# Patient Record
Sex: FEMALE | Race: WHITE | NOT HISPANIC OR LATINO
[De-identification: names, ages, dates, MRNs, and addresses within clinical notes are randomized per-mention and may not be internally consistent; named-entity substitution may affect disease eponyms.]

---

## 2017-07-12 ENCOUNTER — RESULT REVIEW (OUTPATIENT)
Age: 57
End: 2017-07-12

## 2017-07-17 ENCOUNTER — OUTPATIENT (OUTPATIENT)
Dept: OUTPATIENT SERVICES | Facility: HOSPITAL | Age: 57
LOS: 1 days | End: 2017-07-17
Payer: COMMERCIAL

## 2017-07-17 ENCOUNTER — APPOINTMENT (OUTPATIENT)
Dept: MAMMOGRAPHY | Facility: IMAGING CENTER | Age: 57
End: 2017-07-17

## 2017-07-17 DIAGNOSIS — Z00.00 ENCOUNTER FOR GENERAL ADULT MEDICAL EXAMINATION WITHOUT ABNORMAL FINDINGS: ICD-10-CM

## 2017-07-17 PROCEDURE — 77063 BREAST TOMOSYNTHESIS BI: CPT

## 2017-07-17 PROCEDURE — 77067 SCR MAMMO BI INCL CAD: CPT

## 2017-08-21 ENCOUNTER — RESULT REVIEW (OUTPATIENT)
Age: 57
End: 2017-08-21

## 2017-08-21 ENCOUNTER — OUTPATIENT (OUTPATIENT)
Dept: OUTPATIENT SERVICES | Facility: HOSPITAL | Age: 57
LOS: 1 days | Discharge: HOME | End: 2017-08-21
Payer: COMMERCIAL

## 2017-08-21 ENCOUNTER — TRANSCRIPTION ENCOUNTER (OUTPATIENT)
Age: 57
End: 2017-08-21

## 2017-08-21 DIAGNOSIS — R13.11 DYSPHAGIA, ORAL PHASE: ICD-10-CM

## 2017-08-21 DIAGNOSIS — D50.0 IRON DEFICIENCY ANEMIA SECONDARY TO BLOOD LOSS (CHRONIC): ICD-10-CM

## 2017-08-21 PROCEDURE — 45380 COLONOSCOPY AND BIOPSY: CPT | Mod: PT

## 2017-08-21 PROCEDURE — 88312 SPECIAL STAINS GROUP 1: CPT | Mod: 26

## 2017-08-21 PROCEDURE — 88305 TISSUE EXAM BY PATHOLOGIST: CPT

## 2017-08-21 PROCEDURE — 88313 SPECIAL STAINS GROUP 2: CPT

## 2017-08-21 PROCEDURE — 88305 TISSUE EXAM BY PATHOLOGIST: CPT | Mod: 26

## 2017-08-21 PROCEDURE — 88313 SPECIAL STAINS GROUP 2: CPT | Mod: 26

## 2017-08-21 PROCEDURE — 88312 SPECIAL STAINS GROUP 1: CPT

## 2017-08-21 PROCEDURE — 43239 EGD BIOPSY SINGLE/MULTIPLE: CPT

## 2017-08-25 DIAGNOSIS — K29.90 GASTRODUODENITIS, UNSPECIFIED, WITHOUT BLEEDING: ICD-10-CM

## 2017-08-25 DIAGNOSIS — Z98.84 BARIATRIC SURGERY STATUS: ICD-10-CM

## 2017-08-25 DIAGNOSIS — G47.33 OBSTRUCTIVE SLEEP APNEA (ADULT) (PEDIATRIC): ICD-10-CM

## 2017-08-25 DIAGNOSIS — Z12.11 ENCOUNTER FOR SCREENING FOR MALIGNANT NEOPLASM OF COLON: ICD-10-CM

## 2017-08-25 DIAGNOSIS — K64.8 OTHER HEMORRHOIDS: ICD-10-CM

## 2017-08-25 DIAGNOSIS — D12.5 BENIGN NEOPLASM OF SIGMOID COLON: ICD-10-CM

## 2017-08-25 DIAGNOSIS — K29.50 UNSPECIFIED CHRONIC GASTRITIS WITHOUT BLEEDING: ICD-10-CM

## 2017-08-25 DIAGNOSIS — K57.30 DIVERTICULOSIS OF LARGE INTESTINE WITHOUT PERFORATION OR ABSCESS WITHOUT BLEEDING: ICD-10-CM

## 2018-01-16 ENCOUNTER — APPOINTMENT (OUTPATIENT)
Dept: CARDIOLOGY | Facility: CLINIC | Age: 58
End: 2018-01-16
Payer: COMMERCIAL

## 2018-01-16 VITALS
WEIGHT: 293 LBS | DIASTOLIC BLOOD PRESSURE: 94 MMHG | HEIGHT: 68 IN | HEART RATE: 56 BPM | BODY MASS INDEX: 44.41 KG/M2 | OXYGEN SATURATION: 100 % | SYSTOLIC BLOOD PRESSURE: 127 MMHG

## 2018-01-16 VITALS — SYSTOLIC BLOOD PRESSURE: 122 MMHG | DIASTOLIC BLOOD PRESSURE: 78 MMHG

## 2018-01-16 DIAGNOSIS — R55 SYNCOPE AND COLLAPSE: ICD-10-CM

## 2018-01-16 DIAGNOSIS — Z82.49 FAMILY HISTORY OF ISCHEMIC HEART DISEASE AND OTHER DISEASES OF THE CIRCULATORY SYSTEM: ICD-10-CM

## 2018-01-16 PROCEDURE — 99203 OFFICE O/P NEW LOW 30 MIN: CPT

## 2018-01-16 PROCEDURE — 93000 ELECTROCARDIOGRAM COMPLETE: CPT

## 2018-01-17 PROBLEM — R55 SYNCOPE, NEAR: Status: ACTIVE | Noted: 2018-01-16

## 2018-01-25 ENCOUNTER — OUTPATIENT (OUTPATIENT)
Dept: OUTPATIENT SERVICES | Facility: HOSPITAL | Age: 58
LOS: 1 days | End: 2018-01-25

## 2018-01-25 ENCOUNTER — APPOINTMENT (OUTPATIENT)
Dept: CV DIAGNOSITCS | Facility: HOSPITAL | Age: 58
End: 2018-01-25
Payer: COMMERCIAL

## 2018-01-25 DIAGNOSIS — R55 SYNCOPE AND COLLAPSE: ICD-10-CM

## 2018-01-25 PROCEDURE — 93306 TTE W/DOPPLER COMPLETE: CPT | Mod: 26

## 2018-03-27 ENCOUNTER — TRANSCRIPTION ENCOUNTER (OUTPATIENT)
Age: 58
End: 2018-03-27

## 2018-09-18 ENCOUNTER — OUTPATIENT (OUTPATIENT)
Dept: OUTPATIENT SERVICES | Facility: HOSPITAL | Age: 58
LOS: 1 days | End: 2018-09-18
Payer: COMMERCIAL

## 2018-09-18 ENCOUNTER — APPOINTMENT (OUTPATIENT)
Dept: BARIATRICS | Facility: CLINIC | Age: 58
End: 2018-09-18
Payer: COMMERCIAL

## 2018-09-18 VITALS
WEIGHT: 293 LBS | OXYGEN SATURATION: 97 % | DIASTOLIC BLOOD PRESSURE: 68 MMHG | HEIGHT: 68 IN | BODY MASS INDEX: 44.41 KG/M2 | HEART RATE: 56 BPM | SYSTOLIC BLOOD PRESSURE: 124 MMHG

## 2018-09-18 DIAGNOSIS — Z98.84 BARIATRIC SURGERY STATUS: ICD-10-CM

## 2018-09-18 PROCEDURE — 74220 X-RAY XM ESOPHAGUS 1CNTRST: CPT | Mod: 26

## 2018-09-18 PROCEDURE — S2083 ADJUSTMENT GASTRIC BAND: CPT | Mod: 59

## 2018-09-18 PROCEDURE — 74220 X-RAY XM ESOPHAGUS 1CNTRST: CPT

## 2018-09-18 PROCEDURE — 99244 OFF/OP CNSLTJ NEW/EST MOD 40: CPT

## 2018-09-26 ENCOUNTER — APPOINTMENT (OUTPATIENT)
Dept: MAMMOGRAPHY | Facility: IMAGING CENTER | Age: 58
End: 2018-09-26
Payer: COMMERCIAL

## 2018-09-26 ENCOUNTER — OUTPATIENT (OUTPATIENT)
Dept: OUTPATIENT SERVICES | Facility: HOSPITAL | Age: 58
LOS: 1 days | End: 2018-09-26
Payer: COMMERCIAL

## 2018-09-26 DIAGNOSIS — Z00.8 ENCOUNTER FOR OTHER GENERAL EXAMINATION: ICD-10-CM

## 2018-09-26 PROCEDURE — 77067 SCR MAMMO BI INCL CAD: CPT | Mod: 26

## 2018-09-26 PROCEDURE — 77063 BREAST TOMOSYNTHESIS BI: CPT

## 2018-09-26 PROCEDURE — 77067 SCR MAMMO BI INCL CAD: CPT

## 2018-09-26 PROCEDURE — 77063 BREAST TOMOSYNTHESIS BI: CPT | Mod: 26

## 2018-10-02 ENCOUNTER — TRANSCRIPTION ENCOUNTER (OUTPATIENT)
Age: 58
End: 2018-10-02

## 2018-10-09 ENCOUNTER — APPOINTMENT (OUTPATIENT)
Dept: BARIATRICS/WEIGHT MGMT | Facility: CLINIC | Age: 58
End: 2018-10-09
Payer: COMMERCIAL

## 2018-10-09 VITALS — BODY MASS INDEX: 49.07 KG/M2 | WEIGHT: 293 LBS

## 2018-10-09 DIAGNOSIS — Z78.9 OTHER SPECIFIED HEALTH STATUS: ICD-10-CM

## 2018-10-09 PROCEDURE — 90791 PSYCH DIAGNOSTIC EVALUATION: CPT

## 2018-10-18 ENCOUNTER — APPOINTMENT (OUTPATIENT)
Dept: BARIATRICS/WEIGHT MGMT | Facility: CLINIC | Age: 58
End: 2018-10-18
Payer: COMMERCIAL

## 2018-10-18 PROCEDURE — 97802 MEDICAL NUTRITION INDIV IN: CPT

## 2018-10-19 VITALS — BODY MASS INDEX: 44.41 KG/M2 | HEIGHT: 68 IN | WEIGHT: 293 LBS

## 2018-11-06 ENCOUNTER — APPOINTMENT (OUTPATIENT)
Dept: CARDIOLOGY | Facility: CLINIC | Age: 58
End: 2018-11-06
Payer: COMMERCIAL

## 2018-11-06 ENCOUNTER — NON-APPOINTMENT (OUTPATIENT)
Age: 58
End: 2018-11-06

## 2018-11-06 VITALS
HEART RATE: 51 BPM | SYSTOLIC BLOOD PRESSURE: 166 MMHG | RESPIRATION RATE: 16 BRPM | DIASTOLIC BLOOD PRESSURE: 81 MMHG | HEIGHT: 68 IN | BODY MASS INDEX: 44.41 KG/M2 | WEIGHT: 293 LBS | OXYGEN SATURATION: 97 %

## 2018-11-06 VITALS — DIASTOLIC BLOOD PRESSURE: 80 MMHG | SYSTOLIC BLOOD PRESSURE: 126 MMHG

## 2018-11-06 DIAGNOSIS — Z01.818 ENCOUNTER FOR OTHER PREPROCEDURAL EXAMINATION: ICD-10-CM

## 2018-11-06 PROCEDURE — 93000 ELECTROCARDIOGRAM COMPLETE: CPT

## 2018-11-06 PROCEDURE — 99213 OFFICE O/P EST LOW 20 MIN: CPT

## 2018-11-13 ENCOUNTER — APPOINTMENT (OUTPATIENT)
Dept: CV DIAGNOSITCS | Facility: HOSPITAL | Age: 58
End: 2018-11-13
Payer: COMMERCIAL

## 2018-11-13 ENCOUNTER — OUTPATIENT (OUTPATIENT)
Dept: OUTPATIENT SERVICES | Facility: HOSPITAL | Age: 58
LOS: 1 days | End: 2018-11-13

## 2018-11-13 DIAGNOSIS — Z01.818 ENCOUNTER FOR OTHER PREPROCEDURAL EXAMINATION: ICD-10-CM

## 2018-11-13 PROCEDURE — 93325 DOPPLER ECHO COLOR FLOW MAPG: CPT | Mod: 26,GC

## 2018-11-13 PROCEDURE — 93320 DOPPLER ECHO COMPLETE: CPT | Mod: 26,GC

## 2018-11-13 PROCEDURE — 93351 STRESS TTE COMPLETE: CPT | Mod: 26

## 2018-11-15 ENCOUNTER — OUTPATIENT (OUTPATIENT)
Dept: OUTPATIENT SERVICES | Facility: HOSPITAL | Age: 58
LOS: 1 days | End: 2018-11-15
Payer: COMMERCIAL

## 2018-11-15 ENCOUNTER — APPOINTMENT (OUTPATIENT)
Dept: ULTRASOUND IMAGING | Facility: IMAGING CENTER | Age: 58
End: 2018-11-15
Payer: COMMERCIAL

## 2018-11-15 DIAGNOSIS — K82.4 CHOLESTEROLOSIS OF GALLBLADDER: ICD-10-CM

## 2018-11-15 DIAGNOSIS — Z01.818 ENCOUNTER FOR OTHER PREPROCEDURAL EXAMINATION: ICD-10-CM

## 2018-11-15 PROCEDURE — 76700 US EXAM ABDOM COMPLETE: CPT | Mod: 26

## 2018-11-15 PROCEDURE — 93970 EXTREMITY STUDY: CPT | Mod: 26

## 2018-11-15 PROCEDURE — 93970 EXTREMITY STUDY: CPT

## 2018-11-15 PROCEDURE — 76700 US EXAM ABDOM COMPLETE: CPT

## 2018-11-16 PROBLEM — K82.4 GALLBLADDER POLYP: Status: ACTIVE | Noted: 2018-11-16

## 2018-12-04 LAB
25(OH)D3 SERPL-MCNC: 14.3 NG/ML
ALBUMIN SERPL ELPH-MCNC: 4.1 G/DL
ALP BLD-CCNC: 86 U/L
ALT SERPL-CCNC: 18 U/L
ANION GAP SERPL CALC-SCNC: 13 MMOL/L
AST SERPL-CCNC: 24 U/L
BASOPHILS # BLD AUTO: 0.01 K/UL
BASOPHILS NFR BLD AUTO: 0.1 %
BILIRUB SERPL-MCNC: 0.5 MG/DL
BUN SERPL-MCNC: 13 MG/DL
CALCIUM SERPL-MCNC: 9.8 MG/DL
CALCIUM SERPL-MCNC: 9.8 MG/DL
CHLORIDE SERPL-SCNC: 101 MMOL/L
CHOLEST SERPL-MCNC: 234 MG/DL
CHOLEST/HDLC SERPL: 3.2 RATIO
CO2 SERPL-SCNC: 26 MMOL/L
CREAT SERPL-MCNC: 0.72 MG/DL
EOSINOPHIL # BLD AUTO: 0.11 K/UL
EOSINOPHIL NFR BLD AUTO: 1.6 %
FERRITIN SERPL-MCNC: 127 NG/ML
FOLATE SERPL-MCNC: 6.5 NG/ML
GLUCOSE SERPL-MCNC: 70 MG/DL
HBA1C MFR BLD HPLC: 5.3 %
HCT VFR BLD CALC: 41.9 %
HDLC SERPL-MCNC: 74 MG/DL
HGB BLD-MCNC: 13.3 G/DL
IMM GRANULOCYTES NFR BLD AUTO: 0.1 %
INR PPP: 0.91 RATIO
IRON SATN MFR SERPL: 22 %
IRON SERPL-MCNC: 83 UG/DL
LDLC SERPL CALC-MCNC: 145 MG/DL
LYMPHOCYTES # BLD AUTO: 1.7 K/UL
LYMPHOCYTES NFR BLD AUTO: 25.3 %
MAN DIFF?: NORMAL
MCHC RBC-ENTMCNC: 29.6 PG
MCHC RBC-ENTMCNC: 31.7 GM/DL
MCV RBC AUTO: 93.1 FL
MONOCYTES # BLD AUTO: 0.38 K/UL
MONOCYTES NFR BLD AUTO: 5.6 %
NEUTROPHILS # BLD AUTO: 4.52 K/UL
NEUTROPHILS NFR BLD AUTO: 67.3 %
PARATHYROID HORMONE INTACT: 63 PG/ML
PLATELET # BLD AUTO: 301 K/UL
POTASSIUM SERPL-SCNC: 4.4 MMOL/L
PROT SERPL-MCNC: 7.5 G/DL
PT BLD: 10.4 SEC
RBC # BLD: 4.5 M/UL
RBC # FLD: 14.2 %
SODIUM SERPL-SCNC: 140 MMOL/L
T4 FREE SERPL-MCNC: 1.1 NG/DL
TIBC SERPL-MCNC: 382 UG/DL
TRIGL SERPL-MCNC: 74 MG/DL
TSH SERPL-ACNC: 1.64 UIU/ML
UIBC SERPL-MCNC: 299 UG/DL
VIT A SERPL-MCNC: 44.6 UG/DL
VIT B1 SERPL-MCNC: 130.3 NMOL/L
VIT B12 SERPL-MCNC: 380 PG/ML
VIT B2 SERPL-MCNC: 166 UG/L
VIT B6 SERPL-MCNC: 3.6 UG/L
WBC # FLD AUTO: 6.73 K/UL
ZINC SERPL-MCNC: 81 UG/DL

## 2018-12-04 NOTE — HISTORY OF PRESENT ILLNESS
[FreeTextEntry1] : Presents in for follow up. Denies any acute issues. \par  Scheduled for lab band surgery - [place] Possibly in 2019. \par \par Denies any CP, SOB, dizziness, LH, near syncope and syncope. Except for occ palpitations that last for few seconds - " POUNDING "in nature that occur once in a month. Feels lightheaded when palps last for longer period. \par Brother passed away with cardiomyopathy - 2018. Father in law passed away 4 days later. States it has been a difficult year.  \par \par FYI - Was advised Cardionet in the past,  but did not pursue.  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 \par She carries a significant family history of CAD ( dad  s/p MI ), brother with cardiomyopathy ( on transplant list at [facility]) and personal history of obesity s/p lap band surgery ( 8 years ago), h/o palpitations 3 episodes in the last few years ( 1 episodes that woke her from sleep ) and the other 2 episodes lasted for few mins before subsiding. \par In the past, prior to the lap band surgery she underwent a TTE and stress test that showed normal wall motion/ no inducible ischemia or infarction.  \par

## 2018-12-04 NOTE — ADDENDUM
[FreeTextEntry1] : Patient sp stress echo with normal LV function and no ischemia. Poor exercise tolerance\par Can proceed with bariatric surgery with no further workup. Patient is medically optimized.

## 2018-12-04 NOTE — DISCUSSION/SUMMARY
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 \par \par Last TTE 2018 - Normal LVEF. \par However, recommend Stress echocardiogram to evaluate wall-motion abnormality. \par If any evidence of inducible arrhythmia will consider Cardionet monitor.   \par \par \par \par

## 2018-12-04 NOTE — PHYSICAL EXAM
[General Appearance - Well Developed] : well developed [Normal Appearance] : normal appearance [Well Groomed] : well groomed [General Appearance - Well Nourished] : well nourished [No Deformities] : no deformities [General Appearance - In No Acute Distress] : no acute distress [Normal Conjunctiva] : the conjunctiva exhibited no abnormalities [Eyelids - No Xanthelasma] : the eyelids demonstrated no xanthelasmas [Normal Oral Mucosa] : normal oral mucosa [No Oral Pallor] : no oral pallor [No Oral Cyanosis] : no oral cyanosis [Normal Jugular Venous A Waves Present] : normal jugular venous A waves present [Normal Jugular Venous V Waves Present] : normal jugular venous V waves present [No Jugular Venous Pinto A Waves] : no jugular venous pinto A waves [Respiration, Rhythm And Depth] : normal respiratory rhythm and effort [Exaggerated Use Of Accessory Muscles For Inspiration] : no accessory muscle use [Auscultation Breath Sounds / Voice Sounds] : lungs were clear to auscultation bilaterally [Abdomen Soft] : soft [Abdomen Tenderness] : non-tender [Abdomen Mass (___ Cm)] : no abdominal mass palpated [Abnormal Walk] : normal gait [Gait - Sufficient For Exercise Testing] : the gait was sufficient for exercise testing [Nail Clubbing] : no clubbing of the fingernails [Cyanosis, Localized] : no localized cyanosis [Petechial Hemorrhages (___cm)] : no petechial hemorrhages [Skin Color & Pigmentation] : normal skin color and pigmentation [] : no rash [No Venous Stasis] : no venous stasis [Skin Lesions] : no skin lesions [No Skin Ulcers] : no skin ulcer [No Xanthoma] : no  xanthoma was observed [Oriented To Time, Place, And Person] : oriented to person, place, and time [Affect] : the affect was normal [Mood] : the mood was normal [No Anxiety] : not feeling anxious [Normal] : normal [Normal Rate] : normal [Rhythm Regular] : regular [Normal S1] : normal S1 [Normal S2] : normal S2 [No Murmur] : no murmurs heard [2+] : left 2+ [FreeTextEntry1] : obese abdomen

## 2018-12-09 ENCOUNTER — TRANSCRIPTION ENCOUNTER (OUTPATIENT)
Age: 58
End: 2018-12-09

## 2018-12-10 ENCOUNTER — OUTPATIENT (OUTPATIENT)
Dept: OUTPATIENT SERVICES | Facility: HOSPITAL | Age: 58
LOS: 1 days | End: 2018-12-10
Payer: COMMERCIAL

## 2018-12-10 ENCOUNTER — RESULT REVIEW (OUTPATIENT)
Age: 58
End: 2018-12-10

## 2018-12-10 DIAGNOSIS — R13.11 DYSPHAGIA, ORAL PHASE: ICD-10-CM

## 2018-12-10 PROCEDURE — 88312 SPECIAL STAINS GROUP 1: CPT

## 2018-12-10 PROCEDURE — 88312 SPECIAL STAINS GROUP 1: CPT | Mod: 26

## 2018-12-10 PROCEDURE — 88305 TISSUE EXAM BY PATHOLOGIST: CPT

## 2018-12-10 PROCEDURE — 88313 SPECIAL STAINS GROUP 2: CPT | Mod: 26

## 2018-12-10 PROCEDURE — 88313 SPECIAL STAINS GROUP 2: CPT

## 2018-12-10 PROCEDURE — 88305 TISSUE EXAM BY PATHOLOGIST: CPT | Mod: 26

## 2018-12-10 PROCEDURE — 43239 EGD BIOPSY SINGLE/MULTIPLE: CPT

## 2018-12-17 ENCOUNTER — APPOINTMENT (OUTPATIENT)
Dept: ELECTROPHYSIOLOGY | Facility: CLINIC | Age: 58
End: 2018-12-17
Payer: COMMERCIAL

## 2018-12-17 VITALS
BODY MASS INDEX: 48.66 KG/M2 | WEIGHT: 293 LBS | DIASTOLIC BLOOD PRESSURE: 77 MMHG | HEART RATE: 68 BPM | SYSTOLIC BLOOD PRESSURE: 130 MMHG | OXYGEN SATURATION: 98 %

## 2018-12-17 VITALS — HEIGHT: 68 IN

## 2018-12-17 DIAGNOSIS — R00.1 BRADYCARDIA, UNSPECIFIED: ICD-10-CM

## 2018-12-17 PROCEDURE — 99205 OFFICE O/P NEW HI 60 MIN: CPT

## 2018-12-17 PROCEDURE — 93000 ELECTROCARDIOGRAM COMPLETE: CPT

## 2018-12-17 RX ORDER — FLUTICASONE PROPIONATE 50 UG/1
50 SPRAY, METERED NASAL
Qty: 16 | Refills: 0 | Status: DISCONTINUED | COMMUNITY
Start: 2018-09-30

## 2018-12-17 RX ORDER — DOXYCYCLINE HYCLATE 100 MG/1
100 CAPSULE ORAL
Qty: 14 | Refills: 0 | Status: DISCONTINUED | COMMUNITY
Start: 2018-09-30

## 2018-12-17 NOTE — HISTORY OF PRESENT ILLNESS
[FreeTextEntry1] : Kelsie Kirkpatrick MD\par \par Barbara Worthington is a 59y/o woman with Hx of knee pain, utilizes meloxicam as needed, and episodes of palpitations with new diagnosed paroxysmal afib with RVR who presents today for initial evaluation. Admits doing well, currently wearing CaridoNet monitor. Experiences palpitations, typically at night which is awakes her from sleep, which correlated with episode of afib on CardioNet. She has been experiencing these palpitations throughout the past few years but never underwent monitoring or testing. Episodes seem to come and go spontaneously and can last up to an hour in duration. Denies chest pain, SOB, syncope or near syncope. Is also being evaluated for bariatric surgery this upcoming January.

## 2018-12-17 NOTE — PHYSICAL EXAM
[General Appearance - Well Developed] : well developed [Normal Appearance] : normal appearance [Well Groomed] : well groomed [General Appearance - Well Nourished] : well nourished [No Deformities] : no deformities [General Appearance - In No Acute Distress] : no acute distress [Normal Conjunctiva] : the conjunctiva exhibited no abnormalities [Eyelids - No Xanthelasma] : the eyelids demonstrated no xanthelasmas [Normal Oral Mucosa] : normal oral mucosa [No Oral Pallor] : no oral pallor [No Oral Cyanosis] : no oral cyanosis [Normal Jugular Venous A Waves Present] : normal jugular venous A waves present [Normal Jugular Venous V Waves Present] : normal jugular venous V waves present [No Jugular Venous Pinto A Waves] : no jugular venous pinto A waves [Heart Rate And Rhythm] : heart rate and rhythm were normal [Heart Sounds] : normal S1 and S2 [Murmurs] : no murmurs present [Respiration, Rhythm And Depth] : normal respiratory rhythm and effort [Exaggerated Use Of Accessory Muscles For Inspiration] : no accessory muscle use [Auscultation Breath Sounds / Voice Sounds] : lungs were clear to auscultation bilaterally [Abdomen Soft] : soft [Abdomen Tenderness] : non-tender [Abdomen Mass (___ Cm)] : no abdominal mass palpated [Abnormal Walk] : normal gait [Gait - Sufficient For Exercise Testing] : the gait was sufficient for exercise testing [Nail Clubbing] : no clubbing of the fingernails [Cyanosis, Localized] : no localized cyanosis [Petechial Hemorrhages (___cm)] : no petechial hemorrhages [Skin Color & Pigmentation] : normal skin color and pigmentation [] : no rash [No Venous Stasis] : no venous stasis [Skin Lesions] : no skin lesions [No Skin Ulcers] : no skin ulcer [No Xanthoma] : no  xanthoma was observed [Oriented To Time, Place, And Person] : oriented to person, place, and time [Affect] : the affect was normal [Mood] : the mood was normal [No Anxiety] : not feeling anxious

## 2018-12-17 NOTE — DISCUSSION/SUMMARY
[FreeTextEntry1] : Barbara Worthington is a 59y/o woman with Hx of knee pain, utilizes meloxicam as needed, and episodes of palpitations with new diagnosed paroxysmal afib with RVR who presents today for initial evaluation. \par \par Impression:\par \par 1. Paroxysmal afib: Symptomatic afib with RVR noted on CardioNet. Will initiate metoprolol tart 12.5mg BID for improved rate control management and Eliquis 5mg BID for thromboembolic prophylaxis. If symptomatic episodes recur, may consider possible antiarrhythmics vs PVI ablation. Recommend placement of ILR for monitoring of afib burden. Risks, benefits, and alternatives to ILR placement discussed. Will call to schedule if she decides to proceed. \par \par 2. Obesity: upcoming bariatric surgery for this January. May need to hold Eliquis prior to procedure. \par \par 3. Sinus bradycardia: EKG today NSR. Has noted sinus bradycardia and states resting heart rate typically in the 50s. Will start with low dose metoprolol and if symptoms of dizziness or lightheaded occur, will let us know and may need to consider alternative treatment at that time.\par \par Will call to schedule for ILR placement if decides to proceed. \par

## 2018-12-24 ENCOUNTER — NON-APPOINTMENT (OUTPATIENT)
Age: 58
End: 2018-12-24

## 2019-01-02 ENCOUNTER — APPOINTMENT (OUTPATIENT)
Dept: ORTHOPEDIC SURGERY | Facility: CLINIC | Age: 59
End: 2019-01-02
Payer: COMMERCIAL

## 2019-01-02 VITALS
SYSTOLIC BLOOD PRESSURE: 131 MMHG | DIASTOLIC BLOOD PRESSURE: 83 MMHG | WEIGHT: 293 LBS | BODY MASS INDEX: 44.41 KG/M2 | HEIGHT: 68 IN | HEART RATE: 51 BPM

## 2019-01-02 DIAGNOSIS — M79.672 PAIN IN LEFT FOOT: ICD-10-CM

## 2019-01-02 PROCEDURE — 99203 OFFICE O/P NEW LOW 30 MIN: CPT

## 2019-01-02 PROCEDURE — 73630 X-RAY EXAM OF FOOT: CPT | Mod: LT

## 2019-01-15 ENCOUNTER — APPOINTMENT (OUTPATIENT)
Dept: ORTHOPEDIC SURGERY | Facility: CLINIC | Age: 59
End: 2019-01-15
Payer: COMMERCIAL

## 2019-01-15 PROCEDURE — 73630 X-RAY EXAM OF FOOT: CPT | Mod: LT

## 2019-01-15 PROCEDURE — 99213 OFFICE O/P EST LOW 20 MIN: CPT

## 2019-01-15 NOTE — HISTORY OF PRESENT ILLNESS
[A CAM Boot] : a CAM boot [FreeTextEntry1] : 58 year female returns for f/u of L heel pain x 12/31/18. Pt reports progressive improvement since last visit. Pt still has intermittent pain today. Pt states she re-injured her L ankle twice about 1-2 weeks ago. Pt denies any numbness/tingling. Pt limps with ambulation. Denies additional musculoskeletal complaints referable to foot/ankle. Pt is using short cam boot on L foot for ambulation.

## 2019-01-15 NOTE — PHYSICAL EXAM
[Normal] : Oriented to person, place, and time, insight and judgement were intact and the affect was normal [de-identified] : Extremity: ++Equinus L LE, nontender L Achilles, left Achilles tendon intact, Dotson testing normal L LE, decreased minimal tenderness plantar fascia origin L hindfoot, negative calcaneal tuber squeeze testing L hindfoot, mild soft tissue swelling and associated tenderness lateral aspect Chopart's joint L hindfoot.  Nontender L ankle, peroneals, syndesmosis, ST, midfoot LF and PTT insertional, and forefoot / base 5th metatarsal.  Stable Drawer testing L ankle, 5 / 5 evertor strength L ankle, calves soft and nontender, sensorimotor unchanged, skin intact B LE.  AOx3, mood / affect normal. [de-identified] : MERI, NAD [de-identified] : Radiographs (3v L foot) reveal posterior and plantar calcaneal enthesophyte L hindfoot, potential avulsion injury lateral Chopart's joint L hindfoot, ST calcifications distal leg.

## 2019-01-23 ENCOUNTER — RESULT REVIEW (OUTPATIENT)
Age: 59
End: 2019-01-23

## 2019-01-23 ENCOUNTER — APPOINTMENT (OUTPATIENT)
Age: 59
End: 2019-01-23

## 2019-01-29 ENCOUNTER — APPOINTMENT (OUTPATIENT)
Dept: ORTHOPEDIC SURGERY | Facility: CLINIC | Age: 59
End: 2019-01-29
Payer: COMMERCIAL

## 2019-01-29 DIAGNOSIS — M21.6X2 OTHER ACQUIRED DEFORMITIES OF LEFT FOOT: ICD-10-CM

## 2019-01-29 DIAGNOSIS — S93.602D UNSPECIFIED SPRAIN OF LEFT FOOT, SUBSEQUENT ENCOUNTER: ICD-10-CM

## 2019-01-29 DIAGNOSIS — M72.2 PLANTAR FASCIAL FIBROMATOSIS: ICD-10-CM

## 2019-01-29 PROCEDURE — 99213 OFFICE O/P EST LOW 20 MIN: CPT

## 2019-01-31 ENCOUNTER — OTHER (OUTPATIENT)
Age: 59
End: 2019-01-31

## 2019-01-31 PROBLEM — M72.2 PLANTAR FASCIA SYNDROME: Status: ACTIVE | Noted: 2019-01-02

## 2019-01-31 PROBLEM — S93.602D SPRAIN OF LEFT FOOT, SUBSEQUENT ENCOUNTER: Status: ACTIVE | Noted: 2019-01-15

## 2019-01-31 PROBLEM — M21.6X2 GASTROCNEMIUS EQUINUS OF LEFT LOWER EXTREMITY: Status: ACTIVE | Noted: 2019-01-02

## 2019-02-05 ENCOUNTER — OUTPATIENT (OUTPATIENT)
Dept: OUTPATIENT SERVICES | Facility: HOSPITAL | Age: 59
LOS: 1 days | Discharge: HOME | End: 2019-02-05
Payer: COMMERCIAL

## 2019-02-05 DIAGNOSIS — I48.0 PAROXYSMAL ATRIAL FIBRILLATION: ICD-10-CM

## 2019-02-05 DIAGNOSIS — Z46.51 ENCOUNTER FOR FITTING AND ADJUSTMENT OF GASTRIC LAP BAND: Chronic | ICD-10-CM

## 2019-02-05 PROCEDURE — 33285 INSJ SUBQ CAR RHYTHM MNTR: CPT

## 2019-02-05 RX ORDER — SODIUM CHLORIDE 9 MG/ML
3 INJECTION INTRAMUSCULAR; INTRAVENOUS; SUBCUTANEOUS EVERY 8 HOURS
Qty: 0 | Refills: 0 | Status: DISCONTINUED | OUTPATIENT
Start: 2019-02-05 | End: 2019-02-20

## 2019-02-05 RX ORDER — APIXABAN 2.5 MG/1
1 TABLET, FILM COATED ORAL
Qty: 180 | Refills: 0 | OUTPATIENT
Start: 2019-02-05 | End: 2019-05-05

## 2019-02-05 RX ORDER — METOPROLOL TARTRATE 50 MG
0.5 TABLET ORAL
Qty: 90 | Refills: 0 | OUTPATIENT
Start: 2019-02-05 | End: 2019-05-05

## 2019-02-05 NOTE — H&P CARDIOLOGY - PMH
Arthritis    Eczema, unspecified type    Hypercholesterolemia    Obesity, unspecified classification, unspecified obesity type, unspecified whether serious comorbidity present    PAF (paroxysmal atrial fibrillation)  on Eliquis

## 2019-02-05 NOTE — H&P CARDIOLOGY - HISTORY OF PRESENT ILLNESS
58 y.o. female presents today for elective ILR implant. The patient c/o episodes of palpitations, was found to have PAF on CardioNet.  The patient denies chest pain, SOB, dizziness, b/l lower extremities edema, presyncope, syncope, melena, hematochezia, fever, chills, abdominal pain, N/V/D/C, urinary symptoms, h/o DVT, PE, TB, recent travel.

## 2019-02-05 NOTE — H&P CARDIOLOGY - REVIEW OF SYSTEMS
The patient denies chest pain, SOB, dizziness, b/l lower extremities edema, presyncope, syncope, melena, hematochezia, fever, chills, abdominal pain, N/V/D/C, urinary symptoms, h/o DVT, PE, TB, recent travel.

## 2019-02-21 ENCOUNTER — APPOINTMENT (OUTPATIENT)
Dept: ELECTROPHYSIOLOGY | Facility: CLINIC | Age: 59
End: 2019-02-21
Payer: COMMERCIAL

## 2019-02-21 PROBLEM — I48.0 PAROXYSMAL ATRIAL FIBRILLATION: Chronic | Status: ACTIVE | Noted: 2019-02-05

## 2019-02-21 PROBLEM — E78.00 PURE HYPERCHOLESTEROLEMIA, UNSPECIFIED: Chronic | Status: ACTIVE | Noted: 2019-02-05

## 2019-02-21 PROBLEM — E66.9 OBESITY, UNSPECIFIED: Chronic | Status: ACTIVE | Noted: 2019-02-05

## 2019-02-21 PROBLEM — L30.9 DERMATITIS, UNSPECIFIED: Chronic | Status: ACTIVE | Noted: 2019-02-05

## 2019-02-21 PROBLEM — M19.90 UNSPECIFIED OSTEOARTHRITIS, UNSPECIFIED SITE: Chronic | Status: ACTIVE | Noted: 2019-02-05

## 2019-02-21 PROCEDURE — 93285 PRGRMG DEV EVAL SCRMS IP: CPT

## 2019-03-11 ENCOUNTER — APPOINTMENT (OUTPATIENT)
Dept: BARIATRICS | Facility: CLINIC | Age: 59
End: 2019-03-11
Payer: COMMERCIAL

## 2019-03-11 VITALS
DIASTOLIC BLOOD PRESSURE: 80 MMHG | HEART RATE: 55 BPM | BODY MASS INDEX: 44.41 KG/M2 | SYSTOLIC BLOOD PRESSURE: 110 MMHG | HEIGHT: 68 IN | WEIGHT: 293 LBS | OXYGEN SATURATION: 98 %

## 2019-03-11 PROCEDURE — 99214 OFFICE O/P EST MOD 30 MIN: CPT

## 2019-03-14 NOTE — HISTORY OF PRESENT ILLNESS
[Procedure: ___] : Procedure performed: [unfilled]  [Date of Surgery: ___] : Date of Surgery:   [unfilled] [de-identified] : 58 year old female with longstanding history of morbid obesity and history of LAGB undergoing workup for single stage revision from lap band to laparoscopic sleeve gastrectomy presents today for preoperative visit. Patient gained minimal weight since last visit despite complete lap band deflation at initial consult. Patient completed her workup and interim was diagnosed with atrial fibrillation and started on Eliquis and has loop recorder in place. She is aware that she will need to stop Eliquis one week prior to surgery. Patient was previously noncompliant with CPAP and is awaiting new CPAP machine. She reports that she is no longer drinking soda and decreased wine intake to 1 glass 3-4 times a week. She walks at least 8.5K steps daily.  She denies recent stuck episodes, acid reflux symptoms, nausea, vomiting, diarrhea and constipation.

## 2019-03-14 NOTE — REVIEW OF SYSTEMS
[Recent Change In Weight] : ~T recent weight change [Lower Ext Edema] : lower extremity edema [Joint Stiffness] : joint stiffness [Negative] : Endocrine [Palpitations] : no palpitations [Skin Rash] : no skin rash [FreeTextEntry2] : w

## 2019-03-14 NOTE — ASSESSMENT
[FreeTextEntry1] : 58 year old female with longstanding history of morbid obesity and history of LAGB undergoing workup for single stage revision from lap band to laparoscopic sleeve gastrectomy and is ready to be scheduled for revisional bariatric surgery. Patient was encouraged to lose weight prior to surgery. Patient will be on preoperative modified liquid diet.  Nutrition and exercise guidelines were reviewed with the patient. Patient will attend preoperative education class. Procedure risks and benefits were again discussed with patient. All questions were answered. She was informed that she will need to have CPAP machine prior to surgery. \par \par Schedule surgery date\par Two-week preoperative modified liquid diet\par Stop Eliquis one week prior to surgery\par Follow up with CPAP company\par PST and Medical clearance\par Preoperative education class\par Call if any questions or concerns.

## 2019-03-14 NOTE — PHYSICAL EXAM
[Obese, well nourished, in no acute distress] : obese, well nourished, in no acute distress [Normal] : affect appropriate [de-identified] : EOMI, anicteric  [de-identified] : obese, soft, nontender, no evidence of hernia, port palpable nontender

## 2019-03-25 ENCOUNTER — APPOINTMENT (OUTPATIENT)
Dept: ELECTROPHYSIOLOGY | Facility: CLINIC | Age: 59
End: 2019-03-25
Payer: COMMERCIAL

## 2019-03-25 PROCEDURE — 93298 REM INTERROG DEV EVAL SCRMS: CPT

## 2019-03-25 PROCEDURE — 93299: CPT

## 2019-03-28 ENCOUNTER — OUTPATIENT (OUTPATIENT)
Dept: OUTPATIENT SERVICES | Facility: HOSPITAL | Age: 59
LOS: 1 days | End: 2019-03-28
Payer: COMMERCIAL

## 2019-03-28 VITALS
HEIGHT: 66 IN | SYSTOLIC BLOOD PRESSURE: 120 MMHG | TEMPERATURE: 98 F | DIASTOLIC BLOOD PRESSURE: 80 MMHG | RESPIRATION RATE: 14 BRPM | HEART RATE: 48 BPM | WEIGHT: 293 LBS | OXYGEN SATURATION: 99 %

## 2019-03-28 DIAGNOSIS — Z01.818 ENCOUNTER FOR OTHER PREPROCEDURAL EXAMINATION: ICD-10-CM

## 2019-03-28 DIAGNOSIS — Z98.84 BARIATRIC SURGERY STATUS: Chronic | ICD-10-CM

## 2019-03-28 DIAGNOSIS — E66.01 MORBID (SEVERE) OBESITY DUE TO EXCESS CALORIES: ICD-10-CM

## 2019-03-28 DIAGNOSIS — Z46.51 ENCOUNTER FOR FITTING AND ADJUSTMENT OF GASTRIC LAP BAND: Chronic | ICD-10-CM

## 2019-03-28 DIAGNOSIS — Z98.84 BARIATRIC SURGERY STATUS: ICD-10-CM

## 2019-03-28 DIAGNOSIS — G47.33 OBSTRUCTIVE SLEEP APNEA (ADULT) (PEDIATRIC): ICD-10-CM

## 2019-03-28 DIAGNOSIS — Z95.818 PRESENCE OF OTHER CARDIAC IMPLANTS AND GRAFTS: Chronic | ICD-10-CM

## 2019-03-28 LAB
ANION GAP SERPL CALC-SCNC: 7 MMOL/L — SIGNIFICANT CHANGE UP (ref 5–17)
BLD GP AB SCN SERPL QL: SIGNIFICANT CHANGE UP
BUN SERPL-MCNC: 16 MG/DL — SIGNIFICANT CHANGE UP (ref 7–23)
CALCIUM SERPL-MCNC: 9.5 MG/DL — SIGNIFICANT CHANGE UP (ref 8.4–10.5)
CHLORIDE SERPL-SCNC: 103 MMOL/L — SIGNIFICANT CHANGE UP (ref 96–108)
CO2 SERPL-SCNC: 28 MMOL/L — SIGNIFICANT CHANGE UP (ref 22–31)
CREAT SERPL-MCNC: 0.68 MG/DL — SIGNIFICANT CHANGE UP (ref 0.5–1.3)
GLUCOSE SERPL-MCNC: 86 MG/DL — SIGNIFICANT CHANGE UP (ref 70–99)
HCT VFR BLD CALC: 44.4 % — SIGNIFICANT CHANGE UP (ref 34.5–45)
HGB BLD-MCNC: 14.4 G/DL — SIGNIFICANT CHANGE UP (ref 11.5–15.5)
MCHC RBC-ENTMCNC: 29.9 PG — SIGNIFICANT CHANGE UP (ref 27–34)
MCHC RBC-ENTMCNC: 32.4 GM/DL — SIGNIFICANT CHANGE UP (ref 32–36)
MCV RBC AUTO: 92.1 FL — SIGNIFICANT CHANGE UP (ref 80–100)
NRBC # BLD: 0 /100 WBCS — SIGNIFICANT CHANGE UP (ref 0–0)
PLATELET # BLD AUTO: 272 K/UL — SIGNIFICANT CHANGE UP (ref 150–400)
POTASSIUM SERPL-MCNC: 4.3 MMOL/L — SIGNIFICANT CHANGE UP (ref 3.5–5.3)
POTASSIUM SERPL-SCNC: 4.3 MMOL/L — SIGNIFICANT CHANGE UP (ref 3.5–5.3)
RBC # BLD: 4.82 M/UL — SIGNIFICANT CHANGE UP (ref 3.8–5.2)
RBC # FLD: 13.4 % — SIGNIFICANT CHANGE UP (ref 10.3–14.5)
SODIUM SERPL-SCNC: 138 MMOL/L — SIGNIFICANT CHANGE UP (ref 135–145)
WBC # BLD: 6.73 K/UL — SIGNIFICANT CHANGE UP (ref 3.8–10.5)
WBC # FLD AUTO: 6.73 K/UL — SIGNIFICANT CHANGE UP (ref 3.8–10.5)

## 2019-03-28 PROCEDURE — 80048 BASIC METABOLIC PNL TOTAL CA: CPT

## 2019-03-28 PROCEDURE — 86901 BLOOD TYPING SEROLOGIC RH(D): CPT

## 2019-03-28 PROCEDURE — 86900 BLOOD TYPING SEROLOGIC ABO: CPT

## 2019-03-28 PROCEDURE — G0463: CPT

## 2019-03-28 PROCEDURE — 93010 ELECTROCARDIOGRAM REPORT: CPT

## 2019-03-28 PROCEDURE — 36415 COLL VENOUS BLD VENIPUNCTURE: CPT

## 2019-03-28 PROCEDURE — 93005 ELECTROCARDIOGRAM TRACING: CPT

## 2019-03-28 PROCEDURE — 85027 COMPLETE CBC AUTOMATED: CPT

## 2019-03-28 PROCEDURE — 86850 RBC ANTIBODY SCREEN: CPT

## 2019-03-28 RX ORDER — METOPROLOL TARTRATE 50 MG
0.5 TABLET ORAL
Qty: 0 | Refills: 0 | COMMUNITY

## 2019-03-28 RX ORDER — APIXABAN 2.5 MG/1
1 TABLET, FILM COATED ORAL
Qty: 0 | Refills: 0 | COMMUNITY

## 2019-03-28 NOTE — H&P PST ADULT - HISTORY OF PRESENT ILLNESS
This is a 59 y/o female who had undergone lap band placement on 2008 presents with c/o vomiting , multiple episodes food stuck for the past several month . patient states she only lost 50 lbs with lap band , however she regained back . evaluated by surgeon , madison for surgery . scheduled for lap band removal and sleeve gastrectomy on 4/9/19

## 2019-03-28 NOTE — H&P PST ADULT - NSICDXPASTMEDICALHX_GEN_ALL_CORE_FT
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

## 2019-03-28 NOTE — H&P PST ADULT - NSICDXPROBLEM_GEN_ALL_CORE_FT
PROBLEM DIAGNOSES  Problem: Morbid obesity  Assessment and Plan: Laparoscopic band removal and laparoscopic sleeve gastrectomy   Medical clearance   pre op instructions   Cardiac clearance already seen   Will stop Eliquis 1 week before sx as per cardiologist   Address EKG in the clearance

## 2019-03-28 NOTE — H&P PST ADULT - NSICDXPASTSURGICALHX_GEN_ALL_CORE_FT
PAST SURGICAL HISTORY:  Status post gastric banding surgery 2008    Status post placement of implantable loop recorder 2/2019

## 2019-03-28 NOTE — H&P PST ADULT - RS GEN PE MLT RESP DETAILS PC
clear to auscultation bilaterally/good air movement/respirations non-labored/no intercostal retractions/no rales

## 2019-03-28 NOTE — H&P PST ADULT - NSICDXFAMILYHX_GEN_ALL_CORE_FT
FAMILY HISTORY:  Family history of cardiomyopathy, brother diseased at age 54  FH: lung cancer, mother  FHx: heart disease, father diseased at age 52

## 2019-03-28 NOTE — H&P PST ADULT - EXTREMITIES COMMENTS
Bilateral  lower extremity above ankle hyperpigmented skin , brownish discoloration, trace edema > left leg

## 2019-03-29 PROBLEM — Z87.448 PERSONAL HISTORY OF OTHER DISEASES OF URINARY SYSTEM: Chronic | Status: ACTIVE | Noted: 2019-03-28

## 2019-04-04 RX ORDER — HYDROMORPHONE HYDROCHLORIDE 2 MG/ML
0.5 INJECTION INTRAMUSCULAR; INTRAVENOUS; SUBCUTANEOUS EVERY 4 HOURS
Qty: 0 | Refills: 0 | Status: DISCONTINUED | OUTPATIENT
Start: 2019-04-09 | End: 2019-04-10

## 2019-04-04 RX ORDER — SODIUM CHLORIDE 9 MG/ML
1000 INJECTION, SOLUTION INTRAVENOUS
Qty: 0 | Refills: 0 | Status: DISCONTINUED | OUTPATIENT
Start: 2019-04-09 | End: 2019-04-10

## 2019-04-04 RX ORDER — ENOXAPARIN SODIUM 100 MG/ML
40 INJECTION SUBCUTANEOUS EVERY 12 HOURS
Qty: 0 | Refills: 0 | Status: DISCONTINUED | OUTPATIENT
Start: 2019-04-09 | End: 2019-04-10

## 2019-04-04 RX ORDER — HYOSCYAMINE SULFATE 0.13 MG
0.12 TABLET ORAL EVERY 6 HOURS
Qty: 0 | Refills: 0 | Status: DISCONTINUED | OUTPATIENT
Start: 2019-04-09 | End: 2019-04-10

## 2019-04-04 RX ORDER — ONDANSETRON 8 MG/1
4 TABLET, FILM COATED ORAL EVERY 6 HOURS
Qty: 0 | Refills: 0 | Status: DISCONTINUED | OUTPATIENT
Start: 2019-04-09 | End: 2019-04-10

## 2019-04-04 RX ORDER — PANTOPRAZOLE SODIUM 20 MG/1
40 TABLET, DELAYED RELEASE ORAL DAILY
Qty: 0 | Refills: 0 | Status: DISCONTINUED | OUTPATIENT
Start: 2019-04-09 | End: 2019-04-10

## 2019-04-08 ENCOUNTER — TRANSCRIPTION ENCOUNTER (OUTPATIENT)
Age: 59
End: 2019-04-08

## 2019-04-09 ENCOUNTER — RESULT REVIEW (OUTPATIENT)
Age: 59
End: 2019-04-09

## 2019-04-09 ENCOUNTER — APPOINTMENT (OUTPATIENT)
Dept: BARIATRICS | Facility: HOSPITAL | Age: 59
End: 2019-04-09
Payer: COMMERCIAL

## 2019-04-09 ENCOUNTER — INPATIENT (INPATIENT)
Facility: HOSPITAL | Age: 59
LOS: 0 days | Discharge: HOME | DRG: 621 | End: 2019-04-10
Attending: SURGERY | Admitting: SURGERY
Payer: COMMERCIAL

## 2019-04-09 VITALS
SYSTOLIC BLOOD PRESSURE: 130 MMHG | HEIGHT: 68 IN | HEART RATE: 56 BPM | OXYGEN SATURATION: 100 % | WEIGHT: 293 LBS | RESPIRATION RATE: 14 BRPM | TEMPERATURE: 98 F | DIASTOLIC BLOOD PRESSURE: 76 MMHG

## 2019-04-09 DIAGNOSIS — Z01.818 ENCOUNTER FOR OTHER PREPROCEDURAL EXAMINATION: ICD-10-CM

## 2019-04-09 DIAGNOSIS — E66.01 MORBID (SEVERE) OBESITY DUE TO EXCESS CALORIES: ICD-10-CM

## 2019-04-09 DIAGNOSIS — Z98.84 BARIATRIC SURGERY STATUS: Chronic | ICD-10-CM

## 2019-04-09 DIAGNOSIS — I87.2 VENOUS INSUFFICIENCY (CHRONIC) (PERIPHERAL): ICD-10-CM

## 2019-04-09 DIAGNOSIS — Z95.818 PRESENCE OF OTHER CARDIAC IMPLANTS AND GRAFTS: Chronic | ICD-10-CM

## 2019-04-09 DIAGNOSIS — Z98.84 BARIATRIC SURGERY STATUS: ICD-10-CM

## 2019-04-09 DIAGNOSIS — G47.33 OBSTRUCTIVE SLEEP APNEA (ADULT) (PEDIATRIC): ICD-10-CM

## 2019-04-09 DIAGNOSIS — E78.00 PURE HYPERCHOLESTEROLEMIA, UNSPECIFIED: ICD-10-CM

## 2019-04-09 DIAGNOSIS — I48.0 PAROXYSMAL ATRIAL FIBRILLATION: ICD-10-CM

## 2019-04-09 LAB — ABO RH CONFIRMATION: SIGNIFICANT CHANGE UP

## 2019-04-09 PROCEDURE — 43775 LAP SLEEVE GASTRECTOMY: CPT | Mod: AS

## 2019-04-09 PROCEDURE — 88302 TISSUE EXAM BY PATHOLOGIST: CPT | Mod: 26

## 2019-04-09 PROCEDURE — 88305 TISSUE EXAM BY PATHOLOGIST: CPT | Mod: 26

## 2019-04-09 PROCEDURE — 43774 LAP RMVL GASTR ADJ ALL PARTS: CPT

## 2019-04-09 PROCEDURE — 43281 LAP PARAESOPHAG HERN REPAIR: CPT | Mod: AS,59

## 2019-04-09 PROCEDURE — 43774 LAP RMVL GASTR ADJ ALL PARTS: CPT | Mod: AS

## 2019-04-09 PROCEDURE — 43281 LAP PARAESOPHAG HERN REPAIR: CPT | Mod: 59

## 2019-04-09 PROCEDURE — 43775 LAP SLEEVE GASTRECTOMY: CPT | Mod: 22

## 2019-04-09 RX ORDER — SODIUM CHLORIDE 9 MG/ML
1000 INJECTION, SOLUTION INTRAVENOUS
Qty: 0 | Refills: 0 | Status: DISCONTINUED | OUTPATIENT
Start: 2019-04-09 | End: 2019-04-09

## 2019-04-09 RX ORDER — ONDANSETRON 8 MG/1
4 TABLET, FILM COATED ORAL ONCE
Qty: 0 | Refills: 0 | Status: DISCONTINUED | OUTPATIENT
Start: 2019-04-09 | End: 2019-04-09

## 2019-04-09 RX ORDER — METOPROLOL TARTRATE 50 MG
5 TABLET ORAL EVERY 6 HOURS
Qty: 0 | Refills: 0 | Status: DISCONTINUED | OUTPATIENT
Start: 2019-04-09 | End: 2019-04-10

## 2019-04-09 RX ORDER — ACETAMINOPHEN 500 MG
1000 TABLET ORAL EVERY 6 HOURS
Qty: 0 | Refills: 0 | Status: DISCONTINUED | OUTPATIENT
Start: 2019-04-10 | End: 2019-04-10

## 2019-04-09 RX ORDER — AZTREONAM 2 G
2000 VIAL (EA) INJECTION ONCE
Qty: 0 | Refills: 0 | Status: DISCONTINUED | OUTPATIENT
Start: 2019-04-09 | End: 2019-04-09

## 2019-04-09 RX ORDER — APREPITANT 80 MG/1
40 CAPSULE ORAL ONCE
Qty: 0 | Refills: 0 | Status: COMPLETED | OUTPATIENT
Start: 2019-04-09 | End: 2019-04-09

## 2019-04-09 RX ORDER — SODIUM CHLORIDE 9 MG/ML
1000 INJECTION, SOLUTION INTRAVENOUS ONCE
Qty: 0 | Refills: 0 | Status: COMPLETED | OUTPATIENT
Start: 2019-04-09 | End: 2019-04-09

## 2019-04-09 RX ORDER — CHLORHEXIDINE GLUCONATE 213 G/1000ML
1 SOLUTION TOPICAL ONCE
Qty: 0 | Refills: 0 | Status: COMPLETED | OUTPATIENT
Start: 2019-04-09 | End: 2019-04-09

## 2019-04-09 RX ORDER — HYDROMORPHONE HYDROCHLORIDE 2 MG/ML
0.5 INJECTION INTRAMUSCULAR; INTRAVENOUS; SUBCUTANEOUS
Qty: 0 | Refills: 0 | Status: DISCONTINUED | OUTPATIENT
Start: 2019-04-09 | End: 2019-04-09

## 2019-04-09 RX ORDER — ACETAMINOPHEN 500 MG
1000 TABLET ORAL ONCE
Qty: 0 | Refills: 0 | Status: COMPLETED | OUTPATIENT
Start: 2019-04-09 | End: 2019-04-09

## 2019-04-09 RX ORDER — ENOXAPARIN SODIUM 100 MG/ML
40 INJECTION SUBCUTANEOUS ONCE
Qty: 0 | Refills: 0 | Status: COMPLETED | OUTPATIENT
Start: 2019-04-09 | End: 2019-04-09

## 2019-04-09 RX ORDER — IBUPROFEN 200 MG
800 TABLET ORAL EVERY 6 HOURS
Qty: 0 | Refills: 0 | Status: DISCONTINUED | OUTPATIENT
Start: 2019-04-09 | End: 2019-04-10

## 2019-04-09 RX ORDER — ACETAMINOPHEN 500 MG
1000 TABLET ORAL EVERY 6 HOURS
Qty: 0 | Refills: 0 | Status: COMPLETED | OUTPATIENT
Start: 2019-04-09 | End: 2019-04-10

## 2019-04-09 RX ADMIN — Medication 1000 MILLIGRAM(S): at 15:00

## 2019-04-09 RX ADMIN — Medication 400 MILLIGRAM(S): at 20:51

## 2019-04-09 RX ADMIN — SODIUM CHLORIDE 500 MILLILITER(S): 9 INJECTION, SOLUTION INTRAVENOUS at 14:28

## 2019-04-09 RX ADMIN — ENOXAPARIN SODIUM 40 MILLIGRAM(S): 100 INJECTION SUBCUTANEOUS at 20:51

## 2019-04-09 RX ADMIN — HYDROMORPHONE HYDROCHLORIDE 0.5 MILLIGRAM(S): 2 INJECTION INTRAMUSCULAR; INTRAVENOUS; SUBCUTANEOUS at 23:10

## 2019-04-09 RX ADMIN — SODIUM CHLORIDE 1000 MILLILITER(S): 9 INJECTION, SOLUTION INTRAVENOUS at 08:00

## 2019-04-09 RX ADMIN — HYDROMORPHONE HYDROCHLORIDE 0.5 MILLIGRAM(S): 2 INJECTION INTRAMUSCULAR; INTRAVENOUS; SUBCUTANEOUS at 19:22

## 2019-04-09 RX ADMIN — HYDROMORPHONE HYDROCHLORIDE 0.5 MILLIGRAM(S): 2 INJECTION INTRAMUSCULAR; INTRAVENOUS; SUBCUTANEOUS at 19:06

## 2019-04-09 RX ADMIN — APREPITANT 40 MILLIGRAM(S): 80 CAPSULE ORAL at 08:00

## 2019-04-09 RX ADMIN — Medication 400 MILLIGRAM(S): at 14:28

## 2019-04-09 RX ADMIN — ENOXAPARIN SODIUM 40 MILLIGRAM(S): 100 INJECTION SUBCUTANEOUS at 08:00

## 2019-04-09 RX ADMIN — Medication 800 MILLIGRAM(S): at 15:24

## 2019-04-09 RX ADMIN — SODIUM CHLORIDE 150 MILLILITER(S): 9 INJECTION, SOLUTION INTRAVENOUS at 22:48

## 2019-04-09 RX ADMIN — SODIUM CHLORIDE 150 MILLILITER(S): 9 INJECTION, SOLUTION INTRAVENOUS at 18:29

## 2019-04-09 RX ADMIN — HYDROMORPHONE HYDROCHLORIDE 0.5 MILLIGRAM(S): 2 INJECTION INTRAMUSCULAR; INTRAVENOUS; SUBCUTANEOUS at 22:47

## 2019-04-09 RX ADMIN — Medication 516 MILLIGRAM(S): at 14:45

## 2019-04-09 RX ADMIN — CHLORHEXIDINE GLUCONATE 1 APPLICATION(S): 213 SOLUTION TOPICAL at 08:00

## 2019-04-09 RX ADMIN — Medication 5 MILLIGRAM(S): at 14:27

## 2019-04-09 RX ADMIN — SODIUM CHLORIDE 75 MILLILITER(S): 9 INJECTION, SOLUTION INTRAVENOUS at 12:45

## 2019-04-09 RX ADMIN — ONDANSETRON 4 MILLIGRAM(S): 8 TABLET, FILM COATED ORAL at 18:28

## 2019-04-09 NOTE — CONSULT NOTE ADULT - SUBJECTIVE AND OBJECTIVE BOX
PULMONARY/CRITICAL CARE        Patient is a 58y old  Female who presents with a chief complaint of gastric sleeve  BRIEF HOSPITAL COURSE: ***    Events last 24 hours: ***    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  Arthritis  PAF (paroxysmal atrial fibrillation): on Eliquis  Obesity, unspecified classification, unspecified obesity type, unspecified whether serious comorbidity present  Hypercholesterolemia  Eczema, unspecified type  Status post placement of implantable loop recorder: 2/2019  Status post gastric banding surgery: 2008    Allergies    penicillins (Unknown)    Intolerances      FAMILY HISTORY:  Family history of cardiomyopathy: brother diseased at age 54  FH: lung cancer: mother  FHx: heart disease: father diseased at age 52      Review of Systems:  CONSTITUTIONAL: No fever, chills, or fatigue  EYES: No eye pain, visual disturbances, or discharge  ENMT:  No difficulty hearing, tinnitus, vertigo; No sinus or throat pain  NECK: No pain or stiffness  RESPIRATORY: No cough, wheezing, chills or hemoptysis; No shortness of breath  CARDIOVASCULAR: No chest pain, palpitations, dizziness, or leg swelling  GASTROINTESTINAL: No abdominal or epigastric pain. No nausea, vomiting, or hematemesis; No diarrhea or constipation. No melena or hematochezia.  GENITOURINARY: No dysuria, frequency, hematuria, or incontinence  NEUROLOGICAL: No headaches, memory loss, loss of strength, numbness, or tremors  SKIN: No itching, burning, rashes, or lesions   MUSCULOSKELETAL: No joint pain or swelling; No muscle, back, or extremity pain  PSYCHIATRIC: No depression, anxiety, mood swings, or difficulty sleeping      Medications:  aztreonam  IVPB 2000 milliGRAM(s) IV Intermittent every 6 hours                    lactated ringers. 1000 milliLiter(s) IV Continuous <Continuous>                ICU Vital Signs Last 24 Hrs  T(C): 36.6 (09 Apr 2019 07:36), Max: 36.6 (09 Apr 2019 07:36)  T(F): 97.8 (09 Apr 2019 07:36), Max: 97.8 (09 Apr 2019 07:36)  HR: 56 (09 Apr 2019 07:36) (56 - 56)  BP: 130/76 (09 Apr 2019 07:36) (130/76 - 130/76)  BP(mean): --  ABP: --  ABP(mean): --  RR: 14 (09 Apr 2019 07:36) (14 - 14)  SpO2: 100% (09 Apr 2019 07:36) (100% - 100%)    Vital Signs Last 24 Hrs  T(C): 36.6 (09 Apr 2019 07:36), Max: 36.6 (09 Apr 2019 07:36)  T(F): 97.8 (09 Apr 2019 07:36), Max: 97.8 (09 Apr 2019 07:36)  HR: 56 (09 Apr 2019 07:36) (56 - 56)  BP: 130/76 (09 Apr 2019 07:36) (130/76 - 130/76)  BP(mean): --  RR: 14 (09 Apr 2019 07:36) (14 - 14)  SpO2: 100% (09 Apr 2019 07:36) (100% - 100%)        I&O's Detail        LABS:                CAPILLARY BLOOD GLUCOSE            CULTURES:      Physical Examination:    General: No acute distress.  obese female    HEENT: Pupils equal, reactive to light.  Symmetric.    PULM: Clear to auscultation bilaterally, no significant sputum production    CVS: Regular rate and rhythm, no murmurs, rubs, or gallops    ABD: Soft, nondistended, nontender, normoactive bowel sounds, no masses    EXT: No edema, nontender  Stasis changes lower legs. No calf tenderness    SKIN: Warm and well perfused, no rashes noted.    NEURO: Alert, oriented, interactive, nonfocal    RADIOLOGY: ***    CRITICAL CARE TIME SPENT: ***

## 2019-04-09 NOTE — BRIEF OPERATIVE NOTE - NSICDXBRIEFPREOP_GEN_ALL_CORE_FT
PRE-OP DIAGNOSIS:  Morbid obesity with BMI of 45.0-49.9, adult 09-Apr-2019 13:09:53  Bromberg, Nancy E  Morbid obesity due to excess calories 09-Apr-2019 13:09:35  Bromberg, Nancy E  SIMA (obstructive sleep apnea) 09-Apr-2019 13:09:15  Bromberg, Nancy E  Bariatric surgery status 09-Apr-2019 13:08:59  Bromberg, Nancy E

## 2019-04-09 NOTE — BRIEF OPERATIVE NOTE - NSICDXBRIEFPOSTOP_GEN_ALL_CORE_FT
POST-OP DIAGNOSIS:  Morbid obesity due to excess calories 09-Apr-2019 13:12:13  Bromberg, Nancy E  Morbid obesity with BMI of 45.0-49.9, adult 09-Apr-2019 13:11:45  Bromberg, Nancy E  SIMA (obstructive sleep apnea) 09-Apr-2019 13:11:33  Bromberg, Nancy E  Bariatric surgery status 09-Apr-2019 13:11:13  Bromberg, Nancy E  Abdominal adhesions 09-Apr-2019 13:10:54  Bromberg, Nancy E  Hiatal hernia 09-Apr-2019 13:10:39  Bromberg, Nancy E

## 2019-04-09 NOTE — BRIEF OPERATIVE NOTE - NSICDXBRIEFPROCEDURE_GEN_ALL_CORE_FT
PROCEDURES:  EGD 09-Apr-2019 13:18:02 twice Bromberg, Nancy E  Lysis of peritoneal adhesions 09-Apr-2019 13:17:47  Bromberg, Nancy E  Laparoscopic sleeve gastrectomy 09-Apr-2019 13:14:46  Bromberg, Nancy E  Laparoscopic repair of hiatal hernia without using mesh 09-Apr-2019 13:14:29  Bromberg, Nancy E  Laparoscopic removal of gastric band and port 09-Apr-2019 13:13:05  Bromberg, Nancy E

## 2019-04-10 ENCOUNTER — TRANSCRIPTION ENCOUNTER (OUTPATIENT)
Age: 59
End: 2019-04-10

## 2019-04-10 VITALS
OXYGEN SATURATION: 97 % | HEART RATE: 61 BPM | SYSTOLIC BLOOD PRESSURE: 111 MMHG | TEMPERATURE: 97 F | RESPIRATION RATE: 14 BRPM | DIASTOLIC BLOOD PRESSURE: 67 MMHG

## 2019-04-10 DIAGNOSIS — Z98.84 BARIATRIC SURGERY STATUS: ICD-10-CM

## 2019-04-10 DIAGNOSIS — K44.9 DIAPHRAGMATIC HERNIA WITHOUT OBSTRUCTION OR GANGRENE: ICD-10-CM

## 2019-04-10 LAB
ANION GAP SERPL CALC-SCNC: 13 MMOL/L — SIGNIFICANT CHANGE UP (ref 5–17)
BUN SERPL-MCNC: 9 MG/DL — SIGNIFICANT CHANGE UP (ref 7–23)
CALCIUM SERPL-MCNC: 8.6 MG/DL — SIGNIFICANT CHANGE UP (ref 8.4–10.5)
CHLORIDE SERPL-SCNC: 105 MMOL/L — SIGNIFICANT CHANGE UP (ref 96–108)
CO2 SERPL-SCNC: 23 MMOL/L — SIGNIFICANT CHANGE UP (ref 22–31)
CREAT SERPL-MCNC: 0.58 MG/DL — SIGNIFICANT CHANGE UP (ref 0.5–1.3)
GLUCOSE SERPL-MCNC: 91 MG/DL — SIGNIFICANT CHANGE UP (ref 70–99)
HCT VFR BLD CALC: 38.7 % — SIGNIFICANT CHANGE UP (ref 34.5–45)
HGB BLD-MCNC: 12.7 G/DL — SIGNIFICANT CHANGE UP (ref 11.5–15.5)
MCHC RBC-ENTMCNC: 30.1 PG — SIGNIFICANT CHANGE UP (ref 27–34)
MCHC RBC-ENTMCNC: 32.8 GM/DL — SIGNIFICANT CHANGE UP (ref 32–36)
MCV RBC AUTO: 91.7 FL — SIGNIFICANT CHANGE UP (ref 80–100)
NRBC # BLD: 0 /100 WBCS — SIGNIFICANT CHANGE UP (ref 0–0)
PLATELET # BLD AUTO: 243 K/UL — SIGNIFICANT CHANGE UP (ref 150–400)
POTASSIUM SERPL-MCNC: 3.9 MMOL/L — SIGNIFICANT CHANGE UP (ref 3.5–5.3)
POTASSIUM SERPL-SCNC: 3.9 MMOL/L — SIGNIFICANT CHANGE UP (ref 3.5–5.3)
RBC # BLD: 4.22 M/UL — SIGNIFICANT CHANGE UP (ref 3.8–5.2)
RBC # FLD: 13.3 % — SIGNIFICANT CHANGE UP (ref 10.3–14.5)
SODIUM SERPL-SCNC: 141 MMOL/L — SIGNIFICANT CHANGE UP (ref 135–145)
WBC # BLD: 9.96 K/UL — SIGNIFICANT CHANGE UP (ref 3.8–10.5)
WBC # FLD AUTO: 9.96 K/UL — SIGNIFICANT CHANGE UP (ref 3.8–10.5)

## 2019-04-10 PROCEDURE — 94760 N-INVAS EAR/PLS OXIMETRY 1: CPT

## 2019-04-10 PROCEDURE — 88305 TISSUE EXAM BY PATHOLOGIST: CPT

## 2019-04-10 PROCEDURE — 88302 TISSUE EXAM BY PATHOLOGIST: CPT

## 2019-04-10 PROCEDURE — 85027 COMPLETE CBC AUTOMATED: CPT

## 2019-04-10 PROCEDURE — 94660 CPAP INITIATION&MGMT: CPT

## 2019-04-10 PROCEDURE — 36415 COLL VENOUS BLD VENIPUNCTURE: CPT

## 2019-04-10 PROCEDURE — C1889: CPT

## 2019-04-10 PROCEDURE — 94664 DEMO&/EVAL PT USE INHALER: CPT

## 2019-04-10 PROCEDURE — 80048 BASIC METABOLIC PNL TOTAL CA: CPT

## 2019-04-10 RX ORDER — METOPROLOL TARTRATE 50 MG
0.5 TABLET ORAL
Qty: 0 | Refills: 0 | DISCHARGE
Start: 2019-04-10

## 2019-04-10 RX ORDER — METOPROLOL TARTRATE 50 MG
0.5 TABLET ORAL
Qty: 90 | Refills: 0 | OUTPATIENT
Start: 2019-04-10 | End: 2019-07-08

## 2019-04-10 RX ORDER — MELOXICAM 15 MG/1
1 TABLET ORAL
Qty: 0 | Refills: 0 | COMMUNITY

## 2019-04-10 RX ORDER — APIXABAN 2.5 MG/1
1 TABLET, FILM COATED ORAL
Qty: 180 | Refills: 0 | OUTPATIENT
Start: 2019-04-10 | End: 2019-07-08

## 2019-04-10 RX ORDER — CHOLECALCIFEROL (VITAMIN D3) 125 MCG
1 CAPSULE ORAL
Qty: 0 | Refills: 0 | COMMUNITY

## 2019-04-10 RX ADMIN — ENOXAPARIN SODIUM 40 MILLIGRAM(S): 100 INJECTION SUBCUTANEOUS at 08:00

## 2019-04-10 RX ADMIN — Medication 5 MILLIGRAM(S): at 04:02

## 2019-04-10 RX ADMIN — PANTOPRAZOLE SODIUM 40 MILLIGRAM(S): 20 TABLET, DELAYED RELEASE ORAL at 11:24

## 2019-04-10 RX ADMIN — ONDANSETRON 4 MILLIGRAM(S): 8 TABLET, FILM COATED ORAL at 11:23

## 2019-04-10 RX ADMIN — HYDROMORPHONE HYDROCHLORIDE 0.5 MILLIGRAM(S): 2 INJECTION INTRAMUSCULAR; INTRAVENOUS; SUBCUTANEOUS at 08:00

## 2019-04-10 RX ADMIN — Medication 800 MILLIGRAM(S): at 07:01

## 2019-04-10 RX ADMIN — Medication 5 MILLIGRAM(S): at 11:23

## 2019-04-10 RX ADMIN — Medication 800 MILLIGRAM(S): at 14:53

## 2019-04-10 RX ADMIN — Medication 400 MILLIGRAM(S): at 04:02

## 2019-04-10 RX ADMIN — ONDANSETRON 4 MILLIGRAM(S): 8 TABLET, FILM COATED ORAL at 05:20

## 2019-04-10 RX ADMIN — Medication 516 MILLIGRAM(S): at 07:00

## 2019-04-10 RX ADMIN — ONDANSETRON 4 MILLIGRAM(S): 8 TABLET, FILM COATED ORAL at 00:16

## 2019-04-10 RX ADMIN — Medication 516 MILLIGRAM(S): at 14:53

## 2019-04-10 RX ADMIN — HYDROMORPHONE HYDROCHLORIDE 0.5 MILLIGRAM(S): 2 INJECTION INTRAMUSCULAR; INTRAVENOUS; SUBCUTANEOUS at 08:06

## 2019-04-10 RX ADMIN — SODIUM CHLORIDE 150 MILLILITER(S): 9 INJECTION, SOLUTION INTRAVENOUS at 05:20

## 2019-04-10 NOTE — DISCHARGE NOTE PROVIDER - NSDCACTIVITY_GEN_ALL_CORE
No heavy lifting/straining/Do not drive or operate machinery/Showering allowed/Do not make important decisions/Walking - Outdoors allowed/Walking - Indoors allowed/Stairs allowed

## 2019-04-10 NOTE — PROGRESS NOTE ADULT - ASSESSMENT
58f post op day 1    single stage revision w/ removal of gastric band, Rubén, repair of hiatal hernia and lap sleeve gastrectomy   EGD in OR done after band removal and again after sleeve gastrectomy   No evidence for extravasation, no bleeding and lumen was patent from proximal to distal stomach   -bariatric clears   - acid suppression daily and anti-emetic prn   -ambulation encouraged  d/c planning per surgery

## 2019-04-10 NOTE — DISCHARGE NOTE PROVIDER - CARE PROVIDER_API CALL
Hermelinda Johnson (MD)  Surgery  [address] [place] [address]  Phone: [phone]  Fax: [phone]  Follow Up Time:

## 2019-04-10 NOTE — DISCHARGE NOTE PROVIDER - NSDCCPTREATMENT_GEN_ALL_CORE_FT
PRINCIPAL PROCEDURE  Procedure: Laparoscopic removal of gastric restrictive device with conversion to sleeve gastrectomy  Findings and Treatment: Tolerated procedure well.        SECONDARY PROCEDURE  Procedure: Laparoscopic repair of hiatal hernia without using mesh  Findings and Treatment: Tolerated procedure well.

## 2019-04-10 NOTE — PROGRESS NOTE ADULT - SUBJECTIVE AND OBJECTIVE BOX
Pre-Op Dx: Morbid obesity  Procedure: single stage revision of lap band to sleeve gastrectomy with lysis of peritoneal adhesions, hiatal hernia repair and intraoperative EGD  Surgeon: Alex    HPI:   58 year old Female s/p single stage revision of lap band to sleeve gastrectomy with lysis of peritoneal adhesions, hiatal hernia repair and intraoperative EGD POD # 1.  Patient is ambulating on the floor and utilizing incentive spirometry. She is tolerating clear liquid diet.  Urine output was good overnight and sandy catheter removed this AM.  Moderate incisional discomfort. Denies any nausea or vomiting. Pt seen and examined at the bedside.       VITALS:  Vital Signs Last 24 Hrs  T(C): 36.4 (10 Apr 2019 07:59), Max: 36.9 (09 Apr 2019 19:02)  T(F): 97.5 (10 Apr 2019 07:59), Max: 98.4 (09 Apr 2019 19:02)  HR: 68 (10 Apr 2019 03:37) (47 - 80)  BP: 148/80 (10 Apr 2019 03:37) (104/69 - 148/80)  BP(mean): --  RR: 159 (10 Apr 2019 03:37) (14 - 159)  SpO2: 98% (10 Apr 2019 01:43) (94% - 99%)    04-09 @ 07:01  -  04-10 @ 07:00  --------------------------------------------------------  IN: 5150 mL / OUT: 2060 mL / NET: 3090 mL        Physical Exam:  General: A/O x 3, NAD, resting comfortably in bed  Abdominal: soft, ND, nontender to palpation, incisions C/D/I  Extremities: no edema, no calf tenderness B/L

## 2019-04-10 NOTE — PROGRESS NOTE ADULT - PROBLEM SELECTOR PLAN 1
Cont GI / DVT prophylaxis.   Cont  OOB / ambulation on floor / incentive spirometry.  Cont bariatric clear diet as tolerated .  Dietician consult.   Discussed and reviewed home meds  and pain medication with patient . Discussed medication that requires crushing.  Plan to begin protein shakes at home.  Possibly discharged later today or tomorrow.

## 2019-04-10 NOTE — DISCHARGE NOTE NURSING/CASE MANAGEMENT/SOCIAL WORK - NSDCDPATPORTLINK_GEN_ALL_CORE
You can access the [facility] Patient Portal, offered by [facility], by registering with the following website: [email or web address]

## 2019-04-10 NOTE — PROGRESS NOTE ADULT - SUBJECTIVE AND OBJECTIVE BOX
PULMONARY/CRITICAL CARE      INTERVAL HPI/OVERNIGHT EVENTS:    58y FemaleHPI:    Still abdominal discomfort. No sob. Ambulated. Wore cpap.    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  Arthritis  PAF (paroxysmal atrial fibrillation): on Eliquis  Obesity, unspecified classification, unspecified obesity type, unspecified whether serious comorbidity present  Hypercholesterolemia  Eczema, unspecified type  Status post placement of implantable loop recorder: 2/2019  Status post gastric banding surgery: 2008        ICU Vital Signs Last 24 Hrs  T(C): 36.4 (10 Apr 2019 07:59), Max: 36.9 (09 Apr 2019 19:02)  T(F): 97.5 (10 Apr 2019 07:59), Max: 98.4 (09 Apr 2019 19:02)  HR: 68 (10 Apr 2019 03:37) (47 - 80)  BP: 148/80 (10 Apr 2019 03:37) (104/69 - 148/80)  BP(mean): --  ABP: --  ABP(mean): --  RR: 159 (10 Apr 2019 03:37) (14 - 159)  SpO2: 98% (10 Apr 2019 01:43) (94% - 99%)    Qtts:     I&O's Summary    09 Apr 2019 07:01  -  10 Apr 2019 07:00  --------------------------------------------------------  IN: 5150 mL / OUT: 2060 mL / NET: 3090 mL            REVIEW OF SYSTEMS:    CONSTITUTIONAL: No fever, weight loss, or fatigue  RESPIRATORY: No cough, wheezing, chills or hemoptysis; No shortness of breath  CARDIOVASCULAR: No chest pain, palpitations, dizziness, or leg swelling  GASTROINTESTINAL:mild abdominal  pain. No nausea, vomiting, or hematemesis; No diarrhea or constipation. No melena or hematochezia.      PHYSICAL EXAM:    GENERAL: NAD, well-groomed, well-developed, NAD  HEAD:  Atraumatic, Normocephalic  EYES: EOMI, PERRLA, conjunctiva and sclera clear  ENMT: No tonsillar erythema, exudates, or enlargement; Moist mucous membranes, Good dentition, No lesions  NECK: Supple, No JVD, Normal thyroid  NERVOUS SYSTEM:  Alert & Oriented X3, Good concentration; Motor Strength 5/5 B/L upper and lower extremities  CHEST/LUNG: Clear to percussion bilaterally; No rales, rhonchi, wheezing, or rubs  HEART: Regular rate and rhythm; No murmurs, rubs, or gallops  ABDOMEN: Soft, mildy tender, Nondistended; Bowel sounds decreased  EXTREMITIES:  2+ Peripheral Pulses, No clubbing, cyanosis, or edema  LYMPH: No lymphadenopathy noted  SKIN: No rashes or lesions        LABS:                vanco through     RADIOLOGY & ADDITIONAL STUDIES:      CRITICAL CARE TIME SPENT:

## 2019-04-10 NOTE — PROGRESS NOTE ADULT - ASSESSMENT
58 year old Female POD #1 s/p single stage revision of lap band to sleeve gastrectomy with lysis of peritoneal adhesions, hiatal hernia repair and intraoperative EGD is doing well and hemodynamically stable. Follow up labs. Monitor for spontaneous voiding after sandy removal.

## 2019-04-10 NOTE — DISCHARGE NOTE PROVIDER - INSTRUCTIONS
Instructed to ambulate and use incentive spirometry frequently. Ice packs to abdominal wall and shoulders as needed for discomfort. Cont bariatric diet and follow nutritional guidelines as instructed. Pain meds as needed. Pt instructed  to follow up with Dr. Johnson in 1 week.

## 2019-04-10 NOTE — PHARMACOTHERAPY INTERVENTION NOTE - COMMENTS
Educated patient on home medications as well as discharge medications from [facility]. Educated patient to avoid vitamins/supplements and NSAIDs (ie Motrin, Aleve, Advil) until after first post-op visit. Also made patient aware to avoid Bariatric fusion vitamins until after first post-op visit and after speaking with the surgeon. Counseled patient on how to administer Prilosec (open capsule), Zofran ODT (disintegrate on tongue) and Percocet (crush and sprinkle over low fat yogurt or pudding). Made patient aware to avoid use of Liquid Tylenol and Percocet together as they both contain Acetaminophen and to not exceed the MDD. Made patient aware of common side effects to be cognisant of (ie. constipation, nausea, dizziness) and how to combat (ie. Colace). Stressed to patient the importance of hydration and ambulation. Patient in good spirits and ready to go home.

## 2019-04-10 NOTE — PROGRESS NOTE ADULT - SUBJECTIVE AND OBJECTIVE BOX
Chief Complaint:        INTERVAL HPI/OVERNIGHT EVENTS:  no significant events overnight reported   feeling better today   no flatus, pain less today         MEDICATIONS  (STANDING):  enoxaparin Injectable 40 milliGRAM(s) SubCutaneous every 12 hours  lactated ringers. 1000 milliLiter(s) (150 mL/Hr) IV Continuous <Continuous>  metoprolol tartrate Injectable 5 milliGRAM(s) IV Push every 6 hours  ondansetron Injectable 4 milliGRAM(s) IV Push every 6 hours  pantoprazole  Injectable 40 milliGRAM(s) IV Push daily    MEDICATIONS  (PRN):  acetaminophen  IVPB .. 1000 milliGRAM(s) IV Intermittent every 6 hours PRN Mild Pain (1 - 3)  HYDROmorphone  Injectable 0.5 milliGRAM(s) IV Push every 4 hours PRN Severe Pain (7 - 10)  hyoscyamine SL 0.125 milliGRAM(s) SubLingual every 6 hours PRN breakthrough nausea  ibuprofen IVPB .. 800 milliGRAM(s) IV Intermittent every 6 hours PRN Moderate Pain (4 - 6)            Vital Signs Last 24 Hrs  T(C): 36.4 (10 Apr 2019 07:59), Max: 36.9 (09 Apr 2019 19:02)  T(F): 97.5 (10 Apr 2019 07:59), Max: 98.4 (09 Apr 2019 19:02)  HR: 68 (10 Apr 2019 03:37) (47 - 80)  BP: 148/80 (10 Apr 2019 03:37) (104/69 - 148/80)  BP(mean): --  RR: 159 (10 Apr 2019 03:37) (14 - 159)  SpO2: 98% (10 Apr 2019 01:43) (94% - 99%)    Physical exam:    abd soft, obese. mild tenderness, no rebound      bs -  cv s1s2  chest air entry  ext no pitting        LABS:                        12.7   9.96  )-----------( 243      ( 10 Apr 2019 09:17 )             38.7     04-10    141  |  105  |  9   ----------------------------<  91  3.9   |  23  |  0.58    Ca    8.6      10 Apr 2019 09:17            RADIOLOGY & ADDITIONAL TESTS:

## 2019-04-10 NOTE — DISCHARGE NOTE PROVIDER - HOSPITAL COURSE
58 year old female with history of morbid obesity and LAGB now s/p single stage revision of lap band to laparoscopic sleeve gastrectomy with hiatal hernia repair, lysis of peritoneal adhesions and intraoperative EGD. Post operatively patient did well. Treadwell removed POD #1 with subsequent good urine output. Ambulating well on floor. Patient tolerated advancement of diet to bariatric clears.  Nutritional guidelines were reviewed with the nutritionist. Patient felt ready for discharge to home. Pt instructed to drink small frequent amounts, start protein drinks and follow dietary guidelines.

## 2019-04-10 NOTE — DISCHARGE NOTE PROVIDER - REASON FOR ADMISSION
58 year old female with PMHx of morbid obesity and lap band now admitted to [facility] for scheduled single stage revision of lap band to laparoscopic sleeve gastrectomy with hiatal hernia repair.

## 2019-04-10 NOTE — DISCHARGE NOTE PROVIDER - NSDCCPCAREPLAN_GEN_ALL_CORE_FT
PRINCIPAL DISCHARGE DIAGNOSIS  Diagnosis: Morbid obesity  Assessment and Plan of Treatment: Instructed to ambulate and use incentive spirometry frequently. Ice packs to abdominal wall and shoulders as needed for discomfort. Cont bariatric diet and follow nutritional guidelines as instructed. Pain meds as needed. Pt instructed  to follow up with Dr. Johnson in 1 week.      SECONDARY DISCHARGE DIAGNOSES  Diagnosis: S/P bariatric surgery  Assessment and Plan of Treatment: Instructed to ambulate and use incentive spirometry frequently. Ice packs to abdominal wall and shoulders as needed for discomfort. Cont bariatric diet and follow nutritional guidelines as instructed. Pain meds as needed. Pt instructed  to follow up with Dr. Johnson in 1 week.    Diagnosis: Hiatal hernia  Assessment and Plan of Treatment: s/p hiatal hernia repair. Instructed to ambulate and use incentive spirometry frequently. Ice packs to abdominal wall and shoulders as needed for discomfort. Pain meds as needed. Pt instructed  to follow up with Dr. Johnson in 1 week.

## 2019-04-11 ENCOUNTER — MESSAGE (OUTPATIENT)
Age: 59
End: 2019-04-11

## 2019-04-11 LAB — SURGICAL PATHOLOGY STUDY: SIGNIFICANT CHANGE UP

## 2019-04-18 ENCOUNTER — APPOINTMENT (OUTPATIENT)
Dept: BARIATRICS | Facility: CLINIC | Age: 59
End: 2019-04-18
Payer: COMMERCIAL

## 2019-04-18 VITALS
OXYGEN SATURATION: 98 % | BODY MASS INDEX: 44.41 KG/M2 | WEIGHT: 293 LBS | HEIGHT: 68 IN | DIASTOLIC BLOOD PRESSURE: 70 MMHG | SYSTOLIC BLOOD PRESSURE: 100 MMHG | HEART RATE: 63 BPM

## 2019-04-18 PROCEDURE — 99024 POSTOP FOLLOW-UP VISIT: CPT

## 2019-04-18 RX ORDER — MELOXICAM 15 MG/1
15 TABLET ORAL
Refills: 0 | Status: COMPLETED | COMMUNITY
End: 2019-04-18

## 2019-04-18 NOTE — REVIEW OF SYSTEMS
[Recent Change In Weight] : ~T recent weight change [Abdominal Pain] : abdominal pain [Fever] : no fever [Chills] : no chills [Chest Pain] : no chest pain [Dysphagia] : no dysphagia [Lower Ext Edema] : no lower extremity edema [Palpitations] : no palpitations [Wheezing] : no wheezing [Vomiting] : no vomiting [SOB on Exertion] : no shortness of breath during exertion [Diarrhea] : no diarrhea [Constipation] : no constipation [FreeTextEntry2] : weight loss  [Reflux/Heartburn] : no reflux/ heartburn

## 2019-04-18 NOTE — ASSESSMENT
[FreeTextEntry1] : 58 year old F 1 WEEK s/p single stage conversion - removal of lap band and port converted to lap sleeve gastrectomy with intra op EGD.Weight loss since last visit.\par \par Plan to start multivitamins and continue protein and liquid intake as instructed.\par Call for any questions or concerns.\par \par Nutritional counseling has been provided. The patient is encouraged to remain calorie conscious and continue a low fat, low carbohydrate, protein focus diet. Pt encouraged to participate in a daily exercise regimen incorporating cardio and  strength training. \par \par Return to office in 3  weeks or earlier if any concerns.\par

## 2019-04-18 NOTE — HISTORY OF PRESENT ILLNESS
[Procedure: ___] : Procedure performed: [unfilled]  [Date of Surgery: ___] : Date of Surgery:   [unfilled] [Surgeon Name:   ___] : Surgeon Name: Dr. CIFUENTES [Pre-Op Weight ___] : Pre-op weight was [unfilled] lbs [de-identified] : 58 year old F 1 WEEK s/p single stage conversion - removal of lap band and port converted to lap sleeve gastrectomy with intra op EGD. Weight loss since last visit.Reports minimal incisional discomfort.Denies any nausea. Tolerating protein shakes without any issues. Pt states he is consuming a sufficient amount of zero calorie liquid per day. Urine is light colored. Plan to start taking MVI daily. No change in BM. No reflux symptoms. Exercising regularly at home. Plan to start strength training at 6 weeks post op.

## 2019-04-18 NOTE — PHYSICAL EXAM
[Obese, well nourished, in no acute distress] : obese, well nourished, in no acute distress [Normal] : affect appropriate [de-identified] : MINIMAL INCISIONAL DISCOMFORT UPON PALPATION. NO ERYTHEMA NO SWELLING NOTED. INCISION SITES INTACT AND HEALING WELL.

## 2019-04-26 ENCOUNTER — APPOINTMENT (OUTPATIENT)
Dept: ELECTROPHYSIOLOGY | Facility: CLINIC | Age: 59
End: 2019-04-26
Payer: COMMERCIAL

## 2019-04-26 PROCEDURE — 93299: CPT

## 2019-04-26 PROCEDURE — 93298 REM INTERROG DEV EVAL SCRMS: CPT

## 2019-05-10 ENCOUNTER — RX RENEWAL (OUTPATIENT)
Age: 59
End: 2019-05-10

## 2019-05-14 ENCOUNTER — APPOINTMENT (OUTPATIENT)
Dept: BARIATRICS | Facility: CLINIC | Age: 59
End: 2019-05-14
Payer: COMMERCIAL

## 2019-05-14 VITALS
WEIGHT: 293 LBS | OXYGEN SATURATION: 98 % | HEART RATE: 47 BPM | HEIGHT: 68 IN | BODY MASS INDEX: 44.41 KG/M2 | DIASTOLIC BLOOD PRESSURE: 72 MMHG | SYSTOLIC BLOOD PRESSURE: 114 MMHG

## 2019-05-14 PROBLEM — G47.33 OBSTRUCTIVE SLEEP APNEA (ADULT) (PEDIATRIC): Chronic | Status: ACTIVE | Noted: 2019-03-28

## 2019-05-14 PROBLEM — I83.11 VARICOSE VEINS OF RIGHT LOWER EXTREMITY WITH INFLAMMATION: Chronic | Status: ACTIVE | Noted: 2019-03-28

## 2019-05-14 PROBLEM — I87.2 VENOUS INSUFFICIENCY (CHRONIC) (PERIPHERAL): Chronic | Status: ACTIVE | Noted: 2019-03-28

## 2019-05-14 PROBLEM — E66.01 MORBID (SEVERE) OBESITY DUE TO EXCESS CALORIES: Chronic | Status: ACTIVE | Noted: 2019-03-28

## 2019-05-14 PROCEDURE — 99024 POSTOP FOLLOW-UP VISIT: CPT

## 2019-05-14 RX ORDER — OXYCODONE AND ACETAMINOPHEN 5; 325 MG/1; MG/1
5-325 TABLET ORAL EVERY 6 HOURS
Qty: 12 | Refills: 0 | Status: DISCONTINUED | COMMUNITY
Start: 2019-04-02 | End: 2019-05-14

## 2019-05-14 RX ORDER — ONDANSETRON 4 MG/1
4 TABLET, ORALLY DISINTEGRATING ORAL 4 TIMES DAILY
Qty: 15 | Refills: 0 | Status: DISCONTINUED | COMMUNITY
Start: 2019-04-02 | End: 2019-05-14

## 2019-05-23 NOTE — ASSESSMENT
[FreeTextEntry1] : 59 year old female one month s/p single stage revision LAGB to laparoscopic sleeve gastrectomy and hiatal hernia repair presents for post operative visit. She is doing well and continues to lose weight. Incisions are healing appropriately and spitting suture removed without any complications. The patient was encouraged to continue a pureed/soft low fat, low carbohydrate and high protein diet for another week and then progress to regular foods as tolerated. She was directed to have the types of food that she can have the entire appropriate amount in one meal. Patient was advised to increase ambulation and not to do any heavy lifting until 6 weeks post op\par \par Nutrition and exercise counseling provided.\par Continue Vitamins \par Restart omeprazole\par Follow up in 1 month\par Call with any questions or concerns\par

## 2019-05-23 NOTE — PHYSICAL EXAM
[Obese, well nourished, in no acute distress] : obese, well nourished, in no acute distress [Normal] : grossly intact [de-identified] : spitting suture at lower left incision

## 2019-05-23 NOTE — HISTORY OF PRESENT ILLNESS
[Surgeon Name:   ___] : Surgeon Name: Dr. CIFUENTES [Date of Surgery: ___] : Date of Surgery:   [unfilled] [Procedure: ___] : Procedure performed: [unfilled]  [Pre-Op Weight ___] : Pre-op weight was [unfilled] lbs [de-identified] : 59 year old female one month s/p single stage revision LAGB to laparoscopic sleeve gastrectomy and hiatal hernia repair presents for post operative visit. She lost 6 lbs since last visit. Patient briefly met with nutritionist and is consuming the appropriate amount of protein and water daily. She is tolerating advancement of diet to pureed/soft foods. However, she is spreading out her appropriate portion over two "meals" and ends up having 5 meals a day. She states that there are some foods that she can have the entire portion over one meal.   Patient is taking vitamins. She stopped omeprazole three days ago and developed acid reflux symptoms. She denies nausea, vomiting, diarrhea and constipation. She is ambulating frequently for exercise.

## 2019-05-23 NOTE — REVIEW OF SYSTEMS
[Recent Change In Weight] : ~T recent weight change [Reflux/Heartburn] : reflux/heartburn [Negative] : Neurological [Fever] : no fever [Chills] : no chills [Night Sweats] : no night sweats [Fatigue] : no fatigue [Eye Pain] : no eye pain [Red Eyes] : eyes not red [Vision Problems] : no vision problems [Abdominal Pain] : no abdominal pain [Vomiting] : no vomiting [Constipation] : no constipation [Diarrhea] : no diarrhea

## 2019-05-29 ENCOUNTER — APPOINTMENT (OUTPATIENT)
Dept: ELECTROPHYSIOLOGY | Facility: CLINIC | Age: 59
End: 2019-05-29
Payer: COMMERCIAL

## 2019-05-29 PROCEDURE — 93299: CPT

## 2019-05-29 PROCEDURE — 93298 REM INTERROG DEV EVAL SCRMS: CPT

## 2019-06-13 ENCOUNTER — APPOINTMENT (OUTPATIENT)
Dept: BARIATRICS | Facility: CLINIC | Age: 59
End: 2019-06-13
Payer: COMMERCIAL

## 2019-06-13 VITALS
HEIGHT: 68 IN | BODY MASS INDEX: 44.41 KG/M2 | OXYGEN SATURATION: 99 % | WEIGHT: 293 LBS | SYSTOLIC BLOOD PRESSURE: 108 MMHG | DIASTOLIC BLOOD PRESSURE: 70 MMHG | HEART RATE: 43 BPM

## 2019-06-13 PROCEDURE — 99024 POSTOP FOLLOW-UP VISIT: CPT

## 2019-06-13 NOTE — REVIEW OF SYSTEMS
[Recent Change In Weight] : ~T recent weight change [Negative] : Psychiatric [Fever] : no fever [Chills] : no chills [Night Sweats] : no night sweats [Eye Pain] : no eye pain [Red Eyes] : eyes not red [Fatigue] : no fatigue [Vision Problems] : no vision problems [Abdominal Pain] : no abdominal pain [Constipation] : no constipation [Vomiting] : no vomiting [Reflux/Heartburn] : no reflux/ heartburn [Diarrhea] : no diarrhea

## 2019-06-13 NOTE — HISTORY OF PRESENT ILLNESS
[Procedure: ___] : Procedure performed: [unfilled]  [Date of Surgery: ___] : Date of Surgery:   [unfilled] [Surgeon Name:   ___] : Surgeon Name: Dr. CIFUENTES [Pre-Op Weight ___] : Pre-op weight was [unfilled] lbs [de-identified] : 59 year old female two months s/p s/p single stage revision LAGB to laparoscopic sleeve gastrectomy and hiatal hernia repair presents for follow up visit. She lost 7 lbs since last visit. She is consuming the appropriate amount of protein and water daily. Patient is tolerating advancement of diet to regular foods.  She is taking vitamins and omeprazole daily. She denies acid reflux symptoms, nausea, vomiting, diarrhea and constipation. For exercise, she is walking 8-12K steps daily.

## 2019-06-13 NOTE — ASSESSMENT
[FreeTextEntry1] : 59 year old female two months s/p s/p single stage revision LAGB to laparoscopic sleeve gastrectomy and hiatal hernia repair presents for follow up visit.  She is doing well and continues to lose weight. Incisions are healing appropriately. The patient was encouraged to continue a low fat, low carbohydrate and high protein diet.  For exercise, she was advised to walk 10,000 steps daily and to incorporate strength exercises. Given literature on exercise\par \par Nutrition and exercise counseling provided.\par Continue Vitamins and omeprazole\par Follow up in 1 month with labs prior to visit\par Call with any questions or concerns\par

## 2019-06-25 ENCOUNTER — RX RENEWAL (OUTPATIENT)
Age: 59
End: 2019-06-25

## 2019-07-02 ENCOUNTER — APPOINTMENT (OUTPATIENT)
Dept: ELECTROPHYSIOLOGY | Facility: CLINIC | Age: 59
End: 2019-07-02
Payer: COMMERCIAL

## 2019-07-02 PROCEDURE — 93298 REM INTERROG DEV EVAL SCRMS: CPT

## 2019-07-02 PROCEDURE — 93299: CPT

## 2019-08-22 ENCOUNTER — APPOINTMENT (OUTPATIENT)
Dept: ELECTROPHYSIOLOGY | Facility: CLINIC | Age: 59
End: 2019-08-22
Payer: COMMERCIAL

## 2019-08-22 VITALS — RESPIRATION RATE: 14 BRPM | SYSTOLIC BLOOD PRESSURE: 111 MMHG | DIASTOLIC BLOOD PRESSURE: 53 MMHG | HEART RATE: 54 BPM

## 2019-08-22 LAB
BASOPHILS # BLD AUTO: 0.01 K/UL
BASOPHILS NFR BLD AUTO: 0.2 %
EOSINOPHIL # BLD AUTO: 0.1 K/UL
EOSINOPHIL NFR BLD AUTO: 1.6 %
HCT VFR BLD CALC: 41.6 %
HGB BLD-MCNC: 13.1 G/DL
IMM GRANULOCYTES NFR BLD AUTO: 0.3 %
LYMPHOCYTES # BLD AUTO: 1.54 K/UL
LYMPHOCYTES NFR BLD AUTO: 24.7 %
MAN DIFF?: NORMAL
MCHC RBC-ENTMCNC: 28.9 PG
MCHC RBC-ENTMCNC: 31.5 GM/DL
MCV RBC AUTO: 91.8 FL
MONOCYTES # BLD AUTO: 0.45 K/UL
MONOCYTES NFR BLD AUTO: 7.2 %
NEUTROPHILS # BLD AUTO: 4.12 K/UL
NEUTROPHILS NFR BLD AUTO: 66 %
PLATELET # BLD AUTO: 251 K/UL
RBC # BLD: 4.53 M/UL
RBC # FLD: 13.8 %
WBC # FLD AUTO: 6.24 K/UL

## 2019-08-22 PROCEDURE — 93285 PRGRMG DEV EVAL SCRMS IP: CPT

## 2019-08-27 ENCOUNTER — APPOINTMENT (OUTPATIENT)
Dept: BARIATRICS | Facility: CLINIC | Age: 59
End: 2019-08-27
Payer: COMMERCIAL

## 2019-08-27 VITALS
BODY MASS INDEX: 44.07 KG/M2 | DIASTOLIC BLOOD PRESSURE: 70 MMHG | OXYGEN SATURATION: 98 % | HEIGHT: 68 IN | SYSTOLIC BLOOD PRESSURE: 110 MMHG | WEIGHT: 290.78 LBS | HEART RATE: 44 BPM

## 2019-08-27 DIAGNOSIS — Z98.84 BARIATRIC SURGERY STATUS: ICD-10-CM

## 2019-08-27 LAB
25(OH)D3 SERPL-MCNC: 37 NG/ML
ALBUMIN SERPL ELPH-MCNC: 4.2 G/DL
ALP BLD-CCNC: 93 U/L
ALT SERPL-CCNC: 13 U/L
ANION GAP SERPL CALC-SCNC: 12 MMOL/L
AST SERPL-CCNC: 15 U/L
BILIRUB SERPL-MCNC: 0.5 MG/DL
BUN SERPL-MCNC: 14 MG/DL
CALCIUM SERPL-MCNC: 9.6 MG/DL
CHLORIDE SERPL-SCNC: 104 MMOL/L
CO2 SERPL-SCNC: 26 MMOL/L
CREAT SERPL-MCNC: 0.7 MG/DL
FERRITIN SERPL-MCNC: 133 NG/ML
FOLATE SERPL-MCNC: >20 NG/ML
GLUCOSE SERPL-MCNC: 87 MG/DL
IRON SATN MFR SERPL: 22 %
IRON SERPL-MCNC: 73 UG/DL
POTASSIUM SERPL-SCNC: 4.8 MMOL/L
PROT SERPL-MCNC: 7 G/DL
SODIUM SERPL-SCNC: 142 MMOL/L
TIBC SERPL-MCNC: 329 UG/DL
UIBC SERPL-MCNC: 256 UG/DL
VIT A SERPL-MCNC: 44.2 UG/DL
VIT B1 SERPL-MCNC: 188.3 NMOL/L
VIT B12 SERPL-MCNC: 548 PG/ML
VIT B2 SERPL-MCNC: 157 UG/L
VIT B6 SERPL-MCNC: 8.3 UG/L
ZINC SERPL-MCNC: 84 UG/DL

## 2019-08-27 PROCEDURE — 99214 OFFICE O/P EST MOD 30 MIN: CPT

## 2019-08-27 RX ORDER — MULTIVITAMIN
TABLET ORAL
Refills: 0 | Status: ACTIVE | COMMUNITY

## 2019-08-27 RX ORDER — CHOLECALCIFEROL (VITAMIN D3) 1250 MCG
1.25 MG CAPSULE ORAL
Qty: 8 | Refills: 0 | Status: COMPLETED | COMMUNITY
Start: 2018-12-04 | End: 2019-08-27

## 2019-08-27 NOTE — HISTORY OF PRESENT ILLNESS
[Procedure: ___] : Procedure performed: [unfilled]  [Date of Surgery: ___] : Date of Surgery:   [unfilled] [Surgeon Name:   ___] : Surgeon Name: Dr. CIFUENTES [Pre-Op Weight ___] : Pre-op weight was [unfilled] lbs [de-identified] : 59 year old female four months s/p s/p single stage revision LAGB to laparoscopic sleeve gastrectomy and hiatal hernia repair presents for follow up visit. She lost 8 lbs since last visit. Patient is consuming the appropriate amount of protein, but not sufficient water daily.  She sometimes 1-2 snacks (cheese) a day. She is taking vitamins and omeprazole daily. She tried stopping omeprazole, but developed acid reflux symptoms. She denies nausea, vomiting, diarrhea and constipation. For exercise, she is walking 8-12K steps daily. Patient recently had labs drawn.

## 2019-08-27 NOTE — ASSESSMENT
[FreeTextEntry1] : 59 year old female four months s/p s/p single stage revision LAGB to laparoscopic sleeve gastrectomy and hiatal hernia repair presents for follow up visit.  She is doing well and continues to lose weight. Incisions healed appropriately. The patient was encouraged to continue a low fat, low carbohydrate and high protein diet, increase water intake and limit snacking on cheese.  For exercise, she was advised to walk 10,000 steps daily and to incorporate strength exercises. Since patient is not doing strength exercises because of knee pain, it was recommended that she go to [facility] for PT or Aquatic Therapy. Reviewed lab results with patient. \par \par Nutrition and exercise counseling provided.\par Continue Vitamins and omeprazole\par [facility]\par Nutritionist appointment\par Follow up in 2 months\par Call with any questions or concerns\par

## 2019-08-27 NOTE — REVIEW OF SYSTEMS
[Recent Change In Weight] : ~T recent weight change [Joint Pain] : joint pain [Negative] : Psychiatric [Fever] : no fever [Chills] : no chills [Night Sweats] : no night sweats [Fatigue] : no fatigue [Eye Pain] : no eye pain [Red Eyes] : eyes not red [Vision Problems] : no vision problems [Abdominal Pain] : no abdominal pain [Vomiting] : no vomiting [Constipation] : no constipation [Diarrhea] : no diarrhea [Reflux/Heartburn] : no reflux/ heartburn [FreeTextEntry2] : intentional weight loss

## 2019-09-23 ENCOUNTER — APPOINTMENT (OUTPATIENT)
Dept: ELECTROPHYSIOLOGY | Facility: CLINIC | Age: 59
End: 2019-09-23
Payer: COMMERCIAL

## 2019-09-23 PROCEDURE — 93298 REM INTERROG DEV EVAL SCRMS: CPT

## 2019-09-23 PROCEDURE — 93299: CPT

## 2019-09-26 ENCOUNTER — APPOINTMENT (OUTPATIENT)
Dept: ORTHOPEDIC SURGERY | Facility: CLINIC | Age: 59
End: 2019-09-26
Payer: COMMERCIAL

## 2019-09-26 VITALS
DIASTOLIC BLOOD PRESSURE: 77 MMHG | HEART RATE: 52 BPM | HEIGHT: 68 IN | BODY MASS INDEX: 43.95 KG/M2 | SYSTOLIC BLOOD PRESSURE: 118 MMHG | WEIGHT: 290 LBS

## 2019-09-26 DIAGNOSIS — M25.561 PAIN IN RIGHT KNEE: ICD-10-CM

## 2019-09-26 PROCEDURE — 72170 X-RAY EXAM OF PELVIS: CPT

## 2019-09-26 PROCEDURE — 73562 X-RAY EXAM OF KNEE 3: CPT | Mod: RT

## 2019-09-26 PROCEDURE — 99214 OFFICE O/P EST MOD 30 MIN: CPT

## 2019-09-30 ENCOUNTER — RX RENEWAL (OUTPATIENT)
Age: 59
End: 2019-09-30

## 2019-10-08 NOTE — DISCUSSION/SUMMARY
[de-identified] : right knee DJD with flair up \par The natural history and treatment of degenerative arthritis was discussed with the patient at length today. The spectrum of treatment including nonoperative modalities to surgical intervention was elucidated. Noninvasive and nonoperative treatment modalities include weight reduction, activity modification with low impact exercise,  as needed use of acetaminophen or anti-inflammatory medications if tolerated, glucosamine/chondroitin supplements, and physical therapy. Further treatments can include corticosteroid injection and the use of viscosupplementation with hyaluronic acid injections. Definitive surgical treatment can certainly include total joint arthroplasty also. The risks and benefits of each treatment options was discussed and all questions were answered.\par The patient was informed of the findings and recommended conservative management in the form of a home exercise program, activity modifications, stationary bicycling, swimming and weight loss program. A trial of Glucosamine and Chondroiten Sulphate was recommended.\par A prescription for a course of physical therapy was provided.\par PAtient takers Elliquis so she cannot take prescription NSAIDs. She will use Tylenol arthritis for pain. \par Follow-up appointment was recommended in 6 months.\par \par

## 2019-10-08 NOTE — HISTORY OF PRESENT ILLNESS
[7] : an average pain level of 7/10 [de-identified] : Ms. ASHANTI FLOWER is a 59 year old female presenting with long standing right knee pain, worsening over the past few weeks. She localizes the pain to the lateral and posterior aspect of the right knee. The patient describes the pain as burning, and states it is intermittent, based on activity. She states the pain is exacerbated when she first starts moving from a sitting position, as well as at night when trying to sleep. She has been taking Tylenol for pain with only mild temporary relief. She admits to prior conservative management inclusive of physical therapy with only mild improvement in condition. Had gel injections years ago with no relief. She denies hip or lower back pain. The patient admits to limitations in their quality of life, and is present to discuss options for treatment. CORNELL.

## 2019-10-08 NOTE — CONSULT LETTER
[Consult Letter:] : I had the pleasure of evaluating your patient, [unfilled]. [Dear  ___] : Dear  [unfilled], [Consult Closing:] : Thank you very much for allowing me to participate in the care of this patient.  If you have any questions, please do not hesitate to contact me. [Please see my note below.] : Please see my note below. [Sincerely,] : Sincerely, [FreeTextEntry2] : REFUGIO CONNELL  [FreeTextEntry3] : Isiah Corona MD\par \par ______________________________________________\par [facility]: Hip/Knee Arthroplasty\par [address], [address], [place] [address]\par (t) [phone]\par (f) [phone]

## 2019-10-29 ENCOUNTER — APPOINTMENT (OUTPATIENT)
Dept: ELECTROPHYSIOLOGY | Facility: CLINIC | Age: 59
End: 2019-10-29
Payer: COMMERCIAL

## 2019-10-29 PROCEDURE — 93299: CPT

## 2019-10-29 PROCEDURE — 93298 REM INTERROG DEV EVAL SCRMS: CPT

## 2019-12-02 ENCOUNTER — RX RENEWAL (OUTPATIENT)
Age: 59
End: 2019-12-02

## 2019-12-05 ENCOUNTER — APPOINTMENT (OUTPATIENT)
Dept: BARIATRICS | Facility: CLINIC | Age: 59
End: 2019-12-05
Payer: COMMERCIAL

## 2019-12-05 ENCOUNTER — APPOINTMENT (OUTPATIENT)
Dept: BARIATRICS/WEIGHT MGMT | Facility: CLINIC | Age: 59
End: 2019-12-05
Payer: COMMERCIAL

## 2019-12-05 VITALS
SYSTOLIC BLOOD PRESSURE: 137 MMHG | DIASTOLIC BLOOD PRESSURE: 80 MMHG | HEIGHT: 68 IN | WEIGHT: 293 LBS | HEART RATE: 48 BPM | OXYGEN SATURATION: 97 % | BODY MASS INDEX: 44.41 KG/M2

## 2019-12-05 VITALS — HEIGHT: 68 IN | BODY MASS INDEX: 44.41 KG/M2 | WEIGHT: 293 LBS

## 2019-12-05 PROCEDURE — 97803 MED NUTRITION INDIV SUBSEQ: CPT

## 2019-12-05 PROCEDURE — 99214 OFFICE O/P EST MOD 30 MIN: CPT

## 2019-12-05 NOTE — REVIEW OF SYSTEMS
[Recent Change In Weight] : ~T recent weight change [Joint Pain] : joint pain [Negative] : Psychiatric [Palpitations] : palpitations [Reflux/Heartburn] : reflux/heartburn [Fever] : no fever [Chills] : no chills [Night Sweats] : no night sweats [Fatigue] : no fatigue [Eye Pain] : no eye pain [Red Eyes] : eyes not red [Vision Problems] : no vision problems [Chest Pain] : no chest pain [Abdominal Pain] : no abdominal pain [Vomiting] : no vomiting [Constipation] : no constipation [Diarrhea] : no diarrhea [FreeTextEntry2] : weight gain

## 2019-12-05 NOTE — HISTORY OF PRESENT ILLNESS
[Procedure: ___] : Procedure performed: [unfilled]  [Date of Surgery: ___] : Date of Surgery:   [unfilled] [Surgeon Name:   ___] : Surgeon Name: Dr. CIFUENTES [Pre-Op Weight ___] : Pre-op weight was [unfilled] lbs [de-identified] : 59 year old female eight months s/p s/p single stage revision LAGB to laparoscopic sleeve gastrectomy and hiatal hernia repair presents for follow up visit. She gained 4 lbs since last visit. Patient met with nutritionist today and it was determined that she is consuming the appropriate amount of protein, but not sufficient water daily.  She continues to have 1-2 snacks a day and is now snacking late at night. Also, she may be eating beyond the point of physical fullness. She is taking vitamins and omeprazole daily. She is now complaining of nocturnal reflux a few nights a week. She denies nausea, vomiting, diarrhea and constipation. For exercise, she is walking 8-12K steps daily and aquatic therapy 1-2 times a week.

## 2019-12-05 NOTE — PHYSICAL EXAM
[Obese, well nourished, in no acute distress] : obese, well nourished, in no acute distress [Normal] : affect appropriate [de-identified] : obese, soft, nontender, no evidence of hernia. well healed incisions.

## 2019-12-05 NOTE — ASSESSMENT
[FreeTextEntry1] : 59 year old female eight months s/p s/p single stage revision LAGB to laparoscopic sleeve gastrectomy and hiatal hernia repair presents for follow up visit.  She is gaining weight and struggling with behavior changes. The patient was encouraged to have a low fat, low carbohydrate and high protein diet, increase water intake and limit snacking and late night eating. Nutritionist provided behavioral modifications and requested patient to food journal. It was suggested that she note when she has acid reflux on the food journal. For exercise, she was advised to walk 10,000 steps daily and to incorporate strength exercises with the aquatic therapy as tolerated. \par \par Nutrition and exercise counseling provided.\par Continue Vitamins and omeprazole\par Continue aquatic therapy\par Food journal and indicate acid reflux symptoms\par Follow up Nutritionist appointment in 4 weeks\par Psychologist appointment same day as nutritionist appointment\par LABS next month\par Follow up in 2 months\par Call with any questions or concerns\par

## 2019-12-30 ENCOUNTER — APPOINTMENT (OUTPATIENT)
Dept: ELECTROPHYSIOLOGY | Facility: CLINIC | Age: 59
End: 2019-12-30
Payer: COMMERCIAL

## 2019-12-30 PROCEDURE — 93299: CPT

## 2019-12-30 PROCEDURE — 93298 REM INTERROG DEV EVAL SCRMS: CPT

## 2020-01-14 ENCOUNTER — APPOINTMENT (OUTPATIENT)
Dept: BARIATRICS/WEIGHT MGMT | Facility: CLINIC | Age: 60
End: 2020-01-14
Payer: COMMERCIAL

## 2020-01-14 ENCOUNTER — OUTPATIENT (OUTPATIENT)
Dept: OUTPATIENT SERVICES | Facility: HOSPITAL | Age: 60
LOS: 1 days | End: 2020-01-14
Payer: COMMERCIAL

## 2020-01-14 VITALS — BODY MASS INDEX: 44.41 KG/M2 | WEIGHT: 293 LBS | HEIGHT: 68 IN

## 2020-01-14 DIAGNOSIS — Z98.84 BARIATRIC SURGERY STATUS: Chronic | ICD-10-CM

## 2020-01-14 DIAGNOSIS — E66.01 MORBID (SEVERE) OBESITY DUE TO EXCESS CALORIES: ICD-10-CM

## 2020-01-14 DIAGNOSIS — Z98.84 BARIATRIC SURGERY STATUS: ICD-10-CM

## 2020-01-14 DIAGNOSIS — K21.9 GASTRO-ESOPHAGEAL REFLUX DISEASE WITHOUT ESOPHAGITIS: ICD-10-CM

## 2020-01-14 DIAGNOSIS — R63.5 ABNORMAL WEIGHT GAIN: ICD-10-CM

## 2020-01-14 DIAGNOSIS — Z95.818 PRESENCE OF OTHER CARDIAC IMPLANTS AND GRAFTS: Chronic | ICD-10-CM

## 2020-01-14 DIAGNOSIS — E55.9 VITAMIN D DEFICIENCY, UNSPECIFIED: ICD-10-CM

## 2020-01-14 LAB
24R-OH-CALCIDIOL SERPL-MCNC: 33.2 NG/ML — SIGNIFICANT CHANGE UP (ref 30–80)
ALBUMIN SERPL ELPH-MCNC: 3.7 G/DL — SIGNIFICANT CHANGE UP (ref 3.3–5)
ALP SERPL-CCNC: 93 U/L — SIGNIFICANT CHANGE UP (ref 30–120)
ALT FLD-CCNC: 23 U/L DA — SIGNIFICANT CHANGE UP (ref 10–60)
ANION GAP SERPL CALC-SCNC: 6 MMOL/L — SIGNIFICANT CHANGE UP (ref 5–17)
AST SERPL-CCNC: 18 U/L — SIGNIFICANT CHANGE UP (ref 10–40)
BASOPHILS # BLD AUTO: 0.02 K/UL — SIGNIFICANT CHANGE UP (ref 0–0.2)
BASOPHILS NFR BLD AUTO: 0.3 % — SIGNIFICANT CHANGE UP (ref 0–2)
BILIRUB SERPL-MCNC: 0.5 MG/DL — SIGNIFICANT CHANGE UP (ref 0.2–1.2)
BUN SERPL-MCNC: 15 MG/DL — SIGNIFICANT CHANGE UP (ref 7–23)
CALCIUM SERPL-MCNC: 9.2 MG/DL — SIGNIFICANT CHANGE UP (ref 8.4–10.5)
CHLORIDE SERPL-SCNC: 104 MMOL/L — SIGNIFICANT CHANGE UP (ref 96–108)
CHOLEST SERPL-MCNC: 222 MG/DL — HIGH (ref 10–199)
CO2 SERPL-SCNC: 28 MMOL/L — SIGNIFICANT CHANGE UP (ref 22–31)
CREAT SERPL-MCNC: 0.64 MG/DL — SIGNIFICANT CHANGE UP (ref 0.5–1.3)
EOSINOPHIL # BLD AUTO: 0.08 K/UL — SIGNIFICANT CHANGE UP (ref 0–0.5)
EOSINOPHIL NFR BLD AUTO: 1.2 % — SIGNIFICANT CHANGE UP (ref 0–6)
FERRITIN SERPL-MCNC: 89 NG/ML — SIGNIFICANT CHANGE UP (ref 15–150)
FOLATE SERPL-MCNC: 13.9 NG/ML — SIGNIFICANT CHANGE UP
GLUCOSE SERPL-MCNC: 53 MG/DL — LOW (ref 70–99)
HBA1C BLD-MCNC: 5.1 % — SIGNIFICANT CHANGE UP (ref 4–5.6)
HCT VFR BLD CALC: 44.5 % — SIGNIFICANT CHANGE UP (ref 34.5–45)
HDLC SERPL-MCNC: 67 MG/DL — SIGNIFICANT CHANGE UP
HGB BLD-MCNC: 14.2 G/DL — SIGNIFICANT CHANGE UP (ref 11.5–15.5)
IMM GRANULOCYTES NFR BLD AUTO: 0.3 % — SIGNIFICANT CHANGE UP (ref 0–1.5)
IRON SATN MFR SERPL: 22 % — SIGNIFICANT CHANGE UP (ref 14–50)
IRON SATN MFR SERPL: 79 UG/DL — SIGNIFICANT CHANGE UP (ref 30–160)
LIPID PNL WITH DIRECT LDL SERPL: 138 MG/DL — HIGH
LYMPHOCYTES # BLD AUTO: 1.39 K/UL — SIGNIFICANT CHANGE UP (ref 1–3.3)
LYMPHOCYTES # BLD AUTO: 20.1 % — SIGNIFICANT CHANGE UP (ref 13–44)
MCHC RBC-ENTMCNC: 29.9 PG — SIGNIFICANT CHANGE UP (ref 27–34)
MCHC RBC-ENTMCNC: 31.9 GM/DL — LOW (ref 32–36)
MCV RBC AUTO: 93.7 FL — SIGNIFICANT CHANGE UP (ref 80–100)
MONOCYTES # BLD AUTO: 0.48 K/UL — SIGNIFICANT CHANGE UP (ref 0–0.9)
MONOCYTES NFR BLD AUTO: 6.9 % — SIGNIFICANT CHANGE UP (ref 2–14)
NEUTROPHILS # BLD AUTO: 4.93 K/UL — SIGNIFICANT CHANGE UP (ref 1.8–7.4)
NEUTROPHILS NFR BLD AUTO: 71.2 % — SIGNIFICANT CHANGE UP (ref 43–77)
NRBC # BLD: 0 /100 WBCS — SIGNIFICANT CHANGE UP (ref 0–0)
PLATELET # BLD AUTO: 266 K/UL — SIGNIFICANT CHANGE UP (ref 150–400)
POTASSIUM SERPL-MCNC: 4.3 MMOL/L — SIGNIFICANT CHANGE UP (ref 3.5–5.3)
POTASSIUM SERPL-SCNC: 4.3 MMOL/L — SIGNIFICANT CHANGE UP (ref 3.5–5.3)
PROT SERPL-MCNC: 7.7 G/DL — SIGNIFICANT CHANGE UP (ref 6–8.3)
RBC # BLD: 4.75 M/UL — SIGNIFICANT CHANGE UP (ref 3.8–5.2)
RBC # FLD: 12.9 % — SIGNIFICANT CHANGE UP (ref 10.3–14.5)
SODIUM SERPL-SCNC: 138 MMOL/L — SIGNIFICANT CHANGE UP (ref 135–145)
T3FREE SERPL-MCNC: 2.82 PG/ML — SIGNIFICANT CHANGE UP (ref 1.8–4.6)
TIBC SERPL-MCNC: 357 UG/DL — SIGNIFICANT CHANGE UP (ref 220–430)
TOTAL CHOLESTEROL/HDL RATIO MEASUREMENT: 3.3 RATIO — SIGNIFICANT CHANGE UP (ref 3.3–7.1)
TRIGL SERPL-MCNC: 83 MG/DL — SIGNIFICANT CHANGE UP (ref 10–149)
TSH SERPL-MCNC: 1.29 UIU/ML — SIGNIFICANT CHANGE UP (ref 0.27–4.2)
UIBC SERPL-MCNC: 278 UG/DL — SIGNIFICANT CHANGE UP (ref 110–370)
WBC # BLD: 6.92 K/UL — SIGNIFICANT CHANGE UP (ref 3.8–10.5)
WBC # FLD AUTO: 6.92 K/UL — SIGNIFICANT CHANGE UP (ref 3.8–10.5)

## 2020-01-14 PROCEDURE — 97803 MED NUTRITION INDIV SUBSEQ: CPT

## 2020-01-14 PROCEDURE — 84425 ASSAY OF VITAMIN B-1: CPT

## 2020-01-14 PROCEDURE — 84630 ASSAY OF ZINC: CPT

## 2020-01-14 PROCEDURE — 82746 ASSAY OF FOLIC ACID SERUM: CPT

## 2020-01-14 PROCEDURE — 80053 COMPREHEN METABOLIC PANEL: CPT

## 2020-01-14 PROCEDURE — 90832 PSYTX W PT 30 MINUTES: CPT

## 2020-01-14 PROCEDURE — 85027 COMPLETE CBC AUTOMATED: CPT

## 2020-01-14 PROCEDURE — 83550 IRON BINDING TEST: CPT

## 2020-01-14 PROCEDURE — 36415 COLL VENOUS BLD VENIPUNCTURE: CPT

## 2020-01-14 PROCEDURE — 84252 ASSAY OF VITAMIN B-2: CPT

## 2020-01-14 PROCEDURE — 82728 ASSAY OF FERRITIN: CPT

## 2020-01-14 PROCEDURE — 84590 ASSAY OF VITAMIN A: CPT

## 2020-01-14 PROCEDURE — 80061 LIPID PANEL: CPT

## 2020-01-14 PROCEDURE — 82306 VITAMIN D 25 HYDROXY: CPT

## 2020-01-14 PROCEDURE — 83540 ASSAY OF IRON: CPT

## 2020-01-14 PROCEDURE — 84481 FREE ASSAY (FT-3): CPT

## 2020-01-14 PROCEDURE — 84207 ASSAY OF VITAMIN B-6: CPT

## 2020-01-14 PROCEDURE — 84443 ASSAY THYROID STIM HORMONE: CPT

## 2020-01-14 PROCEDURE — 83036 HEMOGLOBIN GLYCOSYLATED A1C: CPT

## 2020-01-16 ENCOUNTER — RESULT REVIEW (OUTPATIENT)
Age: 60
End: 2020-01-16

## 2020-01-17 LAB
VIT B2 SERPL-MCNC: 155 UG/L — SIGNIFICANT CHANGE UP (ref 137–370)
ZINC SERPL-MCNC: 101 UG/DL — SIGNIFICANT CHANGE UP (ref 56–134)

## 2020-01-18 LAB
PYRIDOXAL PHOS SERPL-MCNC: 7 UG/L — SIGNIFICANT CHANGE UP (ref 2–32.8)
VIT A SERPL-MCNC: 49 UG/DL — SIGNIFICANT CHANGE UP (ref 20.1–62)

## 2020-01-19 LAB — VIT B1 SERPL-MCNC: 273.1 NMOL/L — HIGH (ref 66.5–200)

## 2020-01-29 ENCOUNTER — APPOINTMENT (OUTPATIENT)
Dept: MAMMOGRAPHY | Facility: IMAGING CENTER | Age: 60
End: 2020-01-29
Payer: COMMERCIAL

## 2020-01-29 ENCOUNTER — OUTPATIENT (OUTPATIENT)
Dept: OUTPATIENT SERVICES | Facility: HOSPITAL | Age: 60
LOS: 1 days | End: 2020-01-29
Payer: COMMERCIAL

## 2020-01-29 DIAGNOSIS — Z00.8 ENCOUNTER FOR OTHER GENERAL EXAMINATION: ICD-10-CM

## 2020-01-29 DIAGNOSIS — Z95.818 PRESENCE OF OTHER CARDIAC IMPLANTS AND GRAFTS: Chronic | ICD-10-CM

## 2020-01-29 DIAGNOSIS — Z98.84 BARIATRIC SURGERY STATUS: Chronic | ICD-10-CM

## 2020-01-29 PROCEDURE — 77067 SCR MAMMO BI INCL CAD: CPT | Mod: 26

## 2020-01-29 PROCEDURE — 77067 SCR MAMMO BI INCL CAD: CPT

## 2020-01-29 PROCEDURE — 77063 BREAST TOMOSYNTHESIS BI: CPT

## 2020-01-29 PROCEDURE — 77063 BREAST TOMOSYNTHESIS BI: CPT | Mod: 26

## 2020-01-30 ENCOUNTER — APPOINTMENT (OUTPATIENT)
Dept: ELECTROPHYSIOLOGY | Facility: CLINIC | Age: 60
End: 2020-01-30
Payer: COMMERCIAL

## 2020-01-30 PROCEDURE — 93298 REM INTERROG DEV EVAL SCRMS: CPT

## 2020-01-30 PROCEDURE — G2066: CPT

## 2020-02-18 ENCOUNTER — RESULT REVIEW (OUTPATIENT)
Age: 60
End: 2020-02-18

## 2020-02-25 ENCOUNTER — APPOINTMENT (OUTPATIENT)
Dept: BARIATRICS | Facility: CLINIC | Age: 60
End: 2020-02-25
Payer: COMMERCIAL

## 2020-02-25 ENCOUNTER — APPOINTMENT (OUTPATIENT)
Dept: BARIATRICS/WEIGHT MGMT | Facility: CLINIC | Age: 60
End: 2020-02-25
Payer: COMMERCIAL

## 2020-02-25 VITALS
HEART RATE: 47 BPM | OXYGEN SATURATION: 99 % | BODY MASS INDEX: 44.41 KG/M2 | WEIGHT: 293 LBS | DIASTOLIC BLOOD PRESSURE: 60 MMHG | SYSTOLIC BLOOD PRESSURE: 100 MMHG | HEIGHT: 68 IN

## 2020-02-25 DIAGNOSIS — Z72.89 OTHER PROBLEMS RELATED TO LIFESTYLE: ICD-10-CM

## 2020-02-25 PROCEDURE — 90832 PSYTX W PT 30 MINUTES: CPT

## 2020-02-25 PROCEDURE — 99214 OFFICE O/P EST MOD 30 MIN: CPT

## 2020-02-25 NOTE — REASON FOR VISIT
[Post Operative Visit] : a post operative visit for [Morbid Obesity (BMI>40)] : morbid obesity (bmi>40) [S/P Bariatric Surgery] : s/p bariatric surgery

## 2020-02-27 ENCOUNTER — APPOINTMENT (OUTPATIENT)
Dept: ELECTROPHYSIOLOGY | Facility: CLINIC | Age: 60
End: 2020-02-27
Payer: COMMERCIAL

## 2020-02-27 VITALS — RESPIRATION RATE: 14 BRPM | SYSTOLIC BLOOD PRESSURE: 121 MMHG | DIASTOLIC BLOOD PRESSURE: 76 MMHG | HEART RATE: 56 BPM

## 2020-02-27 PROCEDURE — 93285 PRGRMG DEV EVAL SCRMS IP: CPT

## 2020-02-27 RX ORDER — APIXABAN 5 MG/1
5 TABLET, FILM COATED ORAL
Qty: 180 | Refills: 2 | Status: DISCONTINUED | COMMUNITY
Start: 2018-12-15 | End: 2020-02-27

## 2020-02-27 NOTE — HISTORY OF PRESENT ILLNESS
[Procedure: ___] : Procedure performed: [unfilled]  [Date of Surgery: ___] : Date of Surgery:   [unfilled] [Pre-Op Weight ___] : Pre-op weight was [unfilled] lbs [Surgeon Name:   ___] : Surgeon Name: Dr. CIFUENTES [de-identified] : 59 year old F s/p single stage conversion - removal of lap band and port converted to lap sleeve gastrectomy with intra op EGD. Gained 5 lbs since last visit. History of maladaptive eating patterns- making poor food choices / consuming ~ 3 glasses of wine per night 3-4 x per week/ Night time snacking on cheese , popcorn, etc. Pt aware she needs to make significant changes and work on consuming consistent protein focus meals and consuming a sufficient amount of zero calorie liquid per day.Pt is planning to give all that up for Lent and follow 3 meals per day - regimen - no snacking.  Patient is taking vitamin supplements as directed. No change in BM. No reflux symptoms. Exercising regularly incorporating cardio. Plan to increase strength training. Pt saw Dr. Michael today. Planning to schedule a follow up visit with nutritionist/ psych next month.

## 2020-02-27 NOTE — REVIEW OF SYSTEMS
[Recent Change In Weight] : ~T recent weight change [Fever] : no fever [Chills] : no chills [Dysphagia] : no dysphagia [Chest Pain] : no chest pain [Palpitations] : no palpitations [Lower Ext Edema] : no lower extremity edema [Wheezing] : no wheezing [SOB on Exertion] : no shortness of breath during exertion [Abdominal Pain] : no abdominal pain [Vomiting] : no vomiting [Constipation] : no constipation [Diarrhea] : no diarrhea [Reflux/Heartburn] : no reflux/ heartburn [FreeTextEntry2] : weight gain since last visit.

## 2020-02-27 NOTE — ASSESSMENT
[FreeTextEntry1] : 59 year old F s/p single stage conversion - removal of lap band and port converted to lap sleeve gastrectomy with intra op EGD. Gained 5 lbs since last visit. History of maladaptive eating patterns / making poor food choices. \par \par Instructed to continue multivitamins and continue protein and liquid intake as instructed.\par Call for any questions or concerns.\par Discussed and reviewed recent labs with patient.\par \par Nutritional counseling has been provided. The patient is encouraged to remain calorie conscious and continue a low fat, low carbohydrate, protein focus diet. Pt encouraged to participate in a daily exercise regimen incorporating cardio and  strength training. Will avoid ETOH/ snacking between meals/ make healthier food choices. \par \par Return to office in 3 - 4 weeks or earlier if any concerns. see psych and nutritionist same day. \par

## 2020-02-27 NOTE — PHYSICAL EXAM
[Obese, well nourished, in no acute distress] : obese, well nourished, in no acute distress [Normal] : affect appropriate [de-identified] : MINIMAL INCISIONAL DISCOMFORT UPON PALPATION. NO ERYTHEMA NO SWELLING NOTED. INCISION SITES INTACT AND HEALING WELL.

## 2020-03-30 ENCOUNTER — APPOINTMENT (OUTPATIENT)
Dept: ELECTROPHYSIOLOGY | Facility: CLINIC | Age: 60
End: 2020-03-30
Payer: COMMERCIAL

## 2020-03-30 PROCEDURE — G2066: CPT

## 2020-03-30 PROCEDURE — 93298 REM INTERROG DEV EVAL SCRMS: CPT

## 2020-04-13 ENCOUNTER — APPOINTMENT (OUTPATIENT)
Dept: VASCULAR SURGERY | Facility: CLINIC | Age: 60
End: 2020-04-13
Payer: COMMERCIAL

## 2020-04-13 PROCEDURE — 99203 OFFICE O/P NEW LOW 30 MIN: CPT

## 2020-04-13 PROCEDURE — 93970 EXTREMITY STUDY: CPT

## 2020-04-13 NOTE — HISTORY OF PRESENT ILLNESS
[FreeTextEntry1] : 59-year-old female with history of morbid obesity status post gastric sleeve presents to the office with a several month history of left lateral malleolus ulcer.  Patient states that it has been biopsied by dermatology and was found to have inflammation.  She does not have a history of DVT.  She does have a long history of varicose veins.  She is here for evaluation.

## 2020-04-13 NOTE — ASSESSMENT
[FreeTextEntry1] : The patient went a venous duplex study which shows severe insufficiency of the greater and lesser saphenous veins.  And throughout the varicosities.  At this time patient will continue with dressing changes and add increased compression.  She will follow-up in 1 month at which time she probably will be a candidate for an ablation. admission

## 2020-04-13 NOTE — PHYSICAL EXAM
[Normal Breath Sounds] : Normal breath sounds [Normal Rate and Rhythm] : normal rate and rhythm [2+] : left 2+ [Ankle Swelling (On Exam)] : present [Varicose Veins Of Lower Extremities] : bilaterally [] : bilaterally [Ankle Swelling Bilaterally] : severe [No Rash or Lesion] : No rash or lesion [Skin Ulcer] : ulcer [Skin Induration] : induration [Alert] : alert [Calm] : calm [JVD] : no jugular venous distention  [Abdomen Tenderness] : ~T ~M No abdominal tenderness [de-identified] : Appears well/overweight

## 2020-04-21 ENCOUNTER — APPOINTMENT (OUTPATIENT)
Dept: BARIATRICS/WEIGHT MGMT | Facility: CLINIC | Age: 60
End: 2020-04-21

## 2020-04-21 ENCOUNTER — APPOINTMENT (OUTPATIENT)
Dept: BARIATRICS | Facility: CLINIC | Age: 60
End: 2020-04-21

## 2020-04-25 ENCOUNTER — MESSAGE (OUTPATIENT)
Age: 60
End: 2020-04-25

## 2020-04-30 ENCOUNTER — APPOINTMENT (OUTPATIENT)
Dept: ELECTROPHYSIOLOGY | Facility: CLINIC | Age: 60
End: 2020-04-30
Payer: COMMERCIAL

## 2020-04-30 PROCEDURE — G2066: CPT

## 2020-04-30 PROCEDURE — 93298 REM INTERROG DEV EVAL SCRMS: CPT

## 2020-05-06 ENCOUNTER — APPOINTMENT (OUTPATIENT)
Dept: DISASTER EMERGENCY | Facility: HOSPITAL | Age: 60
End: 2020-05-06

## 2020-05-07 LAB
SARS-COV-2 IGG SERPL IA-ACNC: <0.1 INDEX
SARS-COV-2 IGG SERPL QL IA: NEGATIVE

## 2020-05-08 ENCOUNTER — APPOINTMENT (OUTPATIENT)
Dept: DISASTER EMERGENCY | Facility: HOSPITAL | Age: 60
End: 2020-05-08

## 2020-05-19 ENCOUNTER — APPOINTMENT (OUTPATIENT)
Dept: VASCULAR SURGERY | Facility: CLINIC | Age: 60
End: 2020-05-19
Payer: COMMERCIAL

## 2020-05-19 VITALS — WEIGHT: 293 LBS | BODY MASS INDEX: 44.41 KG/M2 | HEIGHT: 68 IN | TEMPERATURE: 97.3 F

## 2020-05-19 PROCEDURE — 99213 OFFICE O/P EST LOW 20 MIN: CPT

## 2020-05-19 NOTE — HISTORY OF PRESENT ILLNESS
[FreeTextEntry1] : 60 year-old female with history of morbid obesity status post gastric sleeve presents to the office with a several month history of left lateral malleolus ulcer.  Patient states that it has been biopsied by dermatology and was found to have inflammation.  She does not have a history of DVT.  She does have a long history of varicose veins.  She is here for evaluation.

## 2020-05-19 NOTE — ASSESSMENT
[FreeTextEntry1] : The patient went a venous duplex study which shows severe insufficiency of the greater and lesser saphenous veins.  And throughout the varicosities.  At this time patient will continue with dressing changes and add increased compression.  At this time there has been minimal improvement would recommend ablation and phlebectomy of the left leg veins.

## 2020-05-19 NOTE — PHYSICAL EXAM
[Normal Breath Sounds] : Normal breath sounds [JVD] : no jugular venous distention  [Normal Rate and Rhythm] : normal rate and rhythm [2+] : left 2+ [Ankle Swelling (On Exam)] : present [Varicose Veins Of Lower Extremities] : bilaterally [] : bilaterally [Ankle Swelling Bilaterally] : severe [Abdomen Tenderness] : ~T ~M No abdominal tenderness [No Rash or Lesion] : No rash or lesion [Skin Ulcer] : ulcer [Skin Induration] : induration [Alert] : alert [Calm] : calm [de-identified] : Appears well/overweight

## 2020-06-01 ENCOUNTER — APPOINTMENT (OUTPATIENT)
Dept: ELECTROPHYSIOLOGY | Facility: CLINIC | Age: 60
End: 2020-06-01
Payer: COMMERCIAL

## 2020-06-01 PROCEDURE — G2066: CPT

## 2020-06-01 PROCEDURE — 93298 REM INTERROG DEV EVAL SCRMS: CPT

## 2020-06-09 ENCOUNTER — APPOINTMENT (OUTPATIENT)
Dept: BARIATRICS | Facility: CLINIC | Age: 60
End: 2020-06-09

## 2020-06-09 ENCOUNTER — APPOINTMENT (OUTPATIENT)
Dept: BARIATRICS/WEIGHT MGMT | Facility: CLINIC | Age: 60
End: 2020-06-09

## 2020-07-06 ENCOUNTER — APPOINTMENT (OUTPATIENT)
Dept: ELECTROPHYSIOLOGY | Facility: CLINIC | Age: 60
End: 2020-07-06
Payer: COMMERCIAL

## 2020-07-06 PROCEDURE — G2066: CPT

## 2020-07-06 PROCEDURE — 93298 REM INTERROG DEV EVAL SCRMS: CPT

## 2020-07-07 ENCOUNTER — APPOINTMENT (OUTPATIENT)
Dept: DISASTER EMERGENCY | Facility: CLINIC | Age: 60
End: 2020-07-07

## 2020-07-09 ENCOUNTER — LABORATORY RESULT (OUTPATIENT)
Age: 60
End: 2020-07-09

## 2020-07-09 ENCOUNTER — APPOINTMENT (OUTPATIENT)
Dept: ENDOVASCULAR SURGERY | Facility: CLINIC | Age: 60
End: 2020-07-09
Payer: COMMERCIAL

## 2020-07-09 VITALS
BODY MASS INDEX: 44.41 KG/M2 | SYSTOLIC BLOOD PRESSURE: 122 MMHG | WEIGHT: 293 LBS | HEIGHT: 68 IN | TEMPERATURE: 97.4 F | DIASTOLIC BLOOD PRESSURE: 75 MMHG | OXYGEN SATURATION: 96 % | HEART RATE: 65 BPM

## 2020-07-09 PROCEDURE — 37765 STAB PHLEB VEINS XTR 10-20: CPT | Mod: LT

## 2020-07-09 PROCEDURE — 36478 ENDOVENOUS LASER 1ST VEIN: CPT | Mod: LT

## 2020-07-10 LAB — SARS-COV-2 N GENE NPH QL NAA+PROBE: NOT DETECTED

## 2020-07-10 NOTE — HISTORY OF PRESENT ILLNESS
[FreeTextEntry1] : alert and oriented x 3 \par accompanied by  Morgan [phone]\par no reported falls \par last Eliquis Monday 7/6/2020\par uses bipap at night [FreeTextEntry5] : yesterday at 7pm [FreeTextEntry6] : Dr Grover

## 2020-07-10 NOTE — PAST MEDICAL HISTORY
[No therapy indicated for cases scheduled for less than one hour] : No therapy indicated for cases scheduled for less than one hour. [Increasing age ( >40 years old)] : Increasing age ( >40 years old) [FreeTextEntry1] : Malignant Hyperthermia Screening Tool and Risk of Bleeding Assessment \par \par Ms. ASHANTI FLOWER denies family of unexpected death following Anesthesia or Exercise.\par Denies Family history of Malignant Hyperthermia, Muscle or Neuromuscular disorder and High Temperature  following exercise.\par \par Ms. ASHANTI FLOWER denies history of Muscle Spasm, Dark or Chocolate- Colored and Unanticipated fever immediately following anaesthesia or serious exercise.\par Ms. ASHANTI FLOWER also denies bleeding tendencies/Risks of Bleeding.\par

## 2020-07-10 NOTE — PROCEDURE
[Resume diet] : resume diet [Dressing checked for bleeding] : Dressing checked for bleeding [D/C IV on discharge] : D/C IV on discharge [Vital signs on admission the q 15 mins x2] : Vital signs on admission the q 15 mins x2 [FreeTextEntry1] : left leg endovenous laser ablation/phlebectomy

## 2020-07-14 ENCOUNTER — APPOINTMENT (OUTPATIENT)
Dept: VASCULAR SURGERY | Facility: CLINIC | Age: 60
End: 2020-07-14
Payer: COMMERCIAL

## 2020-07-14 VITALS — TEMPERATURE: 97.8 F

## 2020-07-14 PROCEDURE — 99024 POSTOP FOLLOW-UP VISIT: CPT

## 2020-07-14 PROCEDURE — 93971 EXTREMITY STUDY: CPT

## 2020-07-14 NOTE — REASON FOR VISIT
[de-identified] : left lower extremity gsv ablation  [de-identified] : pt reports occational discomfort along the medial thigh\par pt states that the pain is not severe and has not taken any medication\par pt denies any cp/sob  [de-identified] : 7/9/20

## 2020-07-14 NOTE — PHYSICAL EXAM
[Ankle Swelling (On Exam)] : present [Ankle Swelling On The Left] : of the left ankle [Ankle Swelling On The Right] : mild [Alert] : alert [Calm] : calm [de-identified] : no erythema puncture site clean and dry  [de-identified] : appears well

## 2020-07-14 NOTE — DISCUSSION/SUMMARY
[FreeTextEntry1] : 61 yo female with history of venous insufficiency s/p left lower extremity gsv ablation presents for follow up \par duplex shows ablated gsv \par pt to continue with compression stockings \par motrin as needed for pain \par pt to follow up in 1 month

## 2020-07-20 RX ORDER — FAMOTIDINE 20 MG/1
20 TABLET, FILM COATED ORAL DAILY
Qty: 90 | Refills: 0 | Status: ACTIVE | COMMUNITY
Start: 2020-07-20 | End: 1900-01-01

## 2020-07-23 ENCOUNTER — OUTPATIENT (OUTPATIENT)
Dept: OUTPATIENT SERVICES | Facility: HOSPITAL | Age: 60
LOS: 1 days | Discharge: HOME | End: 2020-07-23
Payer: MEDICARE

## 2020-07-23 ENCOUNTER — APPOINTMENT (OUTPATIENT)
Dept: ULTRASOUND IMAGING | Facility: HOSPITAL | Age: 60
End: 2020-07-23

## 2020-07-23 DIAGNOSIS — Z98.84 BARIATRIC SURGERY STATUS: Chronic | ICD-10-CM

## 2020-07-23 DIAGNOSIS — K76.89 OTHER SPECIFIED DISEASES OF LIVER: ICD-10-CM

## 2020-07-23 DIAGNOSIS — Z95.818 PRESENCE OF OTHER CARDIAC IMPLANTS AND GRAFTS: Chronic | ICD-10-CM

## 2020-07-23 PROCEDURE — 76700 US EXAM ABDOM COMPLETE: CPT | Mod: 26

## 2020-08-04 ENCOUNTER — APPOINTMENT (OUTPATIENT)
Dept: BARIATRICS | Facility: CLINIC | Age: 60
End: 2020-08-04
Payer: COMMERCIAL

## 2020-08-04 VITALS
SYSTOLIC BLOOD PRESSURE: 120 MMHG | HEART RATE: 68 BPM | WEIGHT: 293 LBS | DIASTOLIC BLOOD PRESSURE: 70 MMHG | BODY MASS INDEX: 44.41 KG/M2 | OXYGEN SATURATION: 99 % | HEIGHT: 68 IN

## 2020-08-04 PROCEDURE — 99214 OFFICE O/P EST MOD 30 MIN: CPT

## 2020-08-04 RX ORDER — FAMOTIDINE 20 MG/1
20 TABLET, FILM COATED ORAL
Qty: 30 | Refills: 1 | Status: DISCONTINUED | COMMUNITY
Start: 2020-05-08 | End: 2020-08-04

## 2020-08-04 NOTE — ASSESSMENT
[FreeTextEntry1] : 60 year old female s/p s/p single stage revision LAGB to laparoscopic sleeve gastrectomy and hiatal hernia repair presents for follow up visit. She continues to regain weight and was referred to Obesity Medicine for medical weight management. The patient was encouraged to have a low fat, low carbohydrate and high protein diet, increase water intake and limit snacking and alcohol intake. For exercise, she was advised to walk 10,000 steps daily and to incorporate strength exercises as tolerated. \par \par Nutrition and exercise counseling provided.\par Continue Vitamins, omeprazole and Famotidine\par LABS\par Refer to Obesity Medicine\par Follow up in 2-3 months\par Call with any questions or concerns\par .

## 2020-08-04 NOTE — PHYSICAL EXAM
[Obese, well nourished, in no acute distress] : obese, well nourished, in no acute distress [Normal] : grossly intact [de-identified] : EOMI, anicteric  [de-identified] : obese, soft, nontender, no evidence of hernia. well healed incisions.

## 2020-08-04 NOTE — HISTORY OF PRESENT ILLNESS
[Procedure: ___] : Procedure performed: [unfilled]  [Surgeon Name:   ___] : Surgeon Name: Dr. CIFUENTES [Date of Surgery: ___] : Date of Surgery:   [unfilled] [Pre-Op Weight ___] : Pre-op weight was [unfilled] lbs [de-identified] : 60 year old female s/p s/p single stage revision LAGB to laparoscopic sleeve gastrectomy and hiatal hernia repair presents for follow up visit. She gained 4 lbs since last visit. Patient wants to get back on track with weight loss. Based on brief diet history, she is consuming the appropriate amount of protein, but not sufficient water daily. 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 She is making efforts to decrease cheese and alcohol intake. Patient continues to take omeprazole once a day and start Famotidine that resolved nocturnal reflux.  She is taking vitamins daily.  She denies nausea, vomiting, diarrhea and constipation. For exercise, she is walking 8 K steps daily and strength exercises are limited because of joint pain. Patient recently had labs drawn with PCP, but it did not include vitamin levels.

## 2020-08-04 NOTE — REVIEW OF SYSTEMS
[Recent Change In Weight] : ~T recent weight change [Palpitations] : palpitations [Joint Pain] : joint pain [Negative] : Psychiatric [Chills] : no chills [Fever] : no fever [Night Sweats] : no night sweats [Fatigue] : no fatigue [Vision Problems] : no vision problems [Eye Pain] : no eye pain [Red Eyes] : eyes not red [Vomiting] : no vomiting [Chest Pain] : no chest pain [Abdominal Pain] : no abdominal pain [Reflux/Heartburn] : no reflux/ heartburn [Constipation] : no constipation [Diarrhea] : no diarrhea [FreeTextEntry2] : weight gain

## 2020-08-10 ENCOUNTER — APPOINTMENT (OUTPATIENT)
Dept: ELECTROPHYSIOLOGY | Facility: CLINIC | Age: 60
End: 2020-08-10
Payer: COMMERCIAL

## 2020-08-10 PROCEDURE — G2066: CPT

## 2020-08-10 PROCEDURE — 93298 REM INTERROG DEV EVAL SCRMS: CPT

## 2020-08-18 ENCOUNTER — APPOINTMENT (OUTPATIENT)
Dept: VASCULAR SURGERY | Facility: CLINIC | Age: 60
End: 2020-08-18
Payer: COMMERCIAL

## 2020-08-18 PROCEDURE — 99213 OFFICE O/P EST LOW 20 MIN: CPT

## 2020-08-18 NOTE — PHYSICAL EXAM
[Ankle Swelling (On Exam)] : present [Ankle Swelling On The Left] : of the left ankle [Ankle Swelling On The Right] : mild [Alert] : alert [Calm] : calm [de-identified] : appears well  [de-identified] : no erythema puncture site clean and dry

## 2020-08-18 NOTE — DISCUSSION/SUMMARY
[FreeTextEntry1] : 61 yo female with history of venous insufficiency s/p left lower extremity gsv ablation presents for follow up \par ulcer only slightly improved\par Lotrisone bid\par pt to continue with compression stockings \par motrin as needed for pain \par pt to follow up in 1 month

## 2020-08-18 NOTE — REASON FOR VISIT
[de-identified] : left lower extremity gsv ablation  [de-identified] : 7/9/20 [de-identified] : pt reports occational discomfort along the medial thigh\par pt states that the pain is not severe and has not taken any medication\par pt denies any cp/sob

## 2020-09-01 ENCOUNTER — APPOINTMENT (OUTPATIENT)
Dept: BARIATRICS/WEIGHT MGMT | Facility: CLINIC | Age: 60
End: 2020-09-01
Payer: COMMERCIAL

## 2020-09-01 PROCEDURE — 99204 OFFICE O/P NEW MOD 45 MIN: CPT | Mod: 95

## 2020-09-01 RX ORDER — OMEPRAZOLE 20 MG/1
20 CAPSULE, DELAYED RELEASE ORAL
Qty: 90 | Refills: 1 | Status: DISCONTINUED | COMMUNITY
Start: 2019-04-02 | End: 2020-09-01

## 2020-09-09 NOTE — HISTORY OF PRESENT ILLNESS
[Home] : at home, [unfilled] , at the time of the visit. [Medical Office: ([facility])___] : at the medical office located in  [Verbal consent obtained from patient] : the patient, [unfilled] [FreeTextEntry1] : 60F PMH Obesity (s/p lap band converted to sleeve 4/2019 w/hiatal hernia repair), GERD, SIMA, AFib on eliquis, PVD who presents as a new patient to weight management clinic for evaluation.\par \par She was referred from bariatric clinic in the setting of some weight regain.\par \par Weight/Diet History: Her heaviest weight as an adult was 335 lbs, prior to surgery, lowest is 250 lbs. She has had weight loss attempts since high school. Her attempts include Overeaters Anonymous (1989), weight watchers for many years and estimates 40 lb weight loss (2016 - 2020), RD [id number] but reports a liquid diet in 2019. She had the lap band placed in 2009 and lost 50 lbs, pre-op weight was 326 lbs, then sleeve in 2019 with 45 lbs weight loss. \par \par Weight today: reports 299 lbs\par \par Diet: Wakes up at 7 am and immediately has coffee.\par B: (9 am) Omelet on work days with cheese and a vegetable, yogurt or cottage cheese\par L: (2 pm) Dinner leftovers or order at work\par D: (5 pm at work, 6:30 at home) BBQ pork and chickpea salad\par Dessert: Tries to avoid\par Snack: Night time, potato chips or cheese, cold cuts.\par Beverages: Wine (8-12 glasses per week), one decaf coffee per day with low fat milk\par Night-time eating: Yes. Including chips, wine. \par Binging: Lots of food at night\par Fast-food/Restaurants: once a week take-out, usually Greek or Bengali.\par Cook/Prepare meals: Yes\par Added oils: Vegetable oil\par Food Journal: Not currently\par \par Exercise: Some limitation with knee pain which has improved significantly post-PT. She walks daily and sometimes does activity in the pool or dancing. Walks the dog. No gym membership.\par \par Sleep: Goes to sleep 11-12 and wakes up 7. Works 10:30 am to 10:30 pm. Maybe goes to bathroom once. Wears CPAP.\par \par Social: Works in the hospital. No tobacco or drug use. Drinks 1-2 glasses of wine each night.\par \par No history of kidney stones, pancreatitis, FHx pancreatic ca or MTC. Denies hx of glaucoma but states she had a laser procedure for "closed angle" by her ophthalmologist that resolved her eye issue.

## 2020-09-09 NOTE — ASSESSMENT
[FreeTextEntry1] : 60F PMH Obesity (s/p lap band converted to sleeve 4/2019 w/hiatal hernia repair), GERD, SIMA, AFib on eliquis, PVD who presents as a new patient to weight management clinic for evaluation.\par \par # Obesity (s/p lap band revised to sleeve) c/b SIMA on CPAP, Paroxysmal AFib on Eliquis, SIMA, PVD, GERD: Presents with post-surgical weight regain. 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 Currently reports weight 299 lbs, BMI 45.5 (lowest 250 lbs).\par - Discussed trying to shift food schedule earlier in the day within limitations of work schedule, this may involve adding food to breakfast (ie piece of fruit) or snack between breakfast and lunch (nuts, fruit, etc). Large lunch is limited because other break is dinner 3 hrs later. She may benefit from larger 5 pm dinner to prevent at-home night eating. \par - Discussed limiting availability of night snacks. As above, not setting up for hunger at night, minimizing alcohol consumption (perhaps sometimes replacing with herbal tea)\par - Aim for 12 hour fast, may help to eliminate 7 am coffee, although reducing night-eating is most important.\par - Will further discuss including more plant-based whole foods in diet, including legumes for protein. Recommend avoiding high-calorie density sources such as cheese (which she uses in omelet)\par - C/w food log\par - C/w regular activity\par - C/w CPAP\par - Discussed medication options, primarily focused on GLP-1 vs Topiramate. Given her hx bariatric procedure and night-eating difficulties, and comorbidities of GERD, I am leaning toward Topiramate, although she notes history of "acute angle" post laser operation. Patient will call her ophthalmologist to clarify her diagnosis and post-op safety for topiramate.\par Update: patient verified w/ophthalmologist, procedure relieved glaucoma and she should be safe for Topiramate.\par \par \par Bariatric Surgery History:  lap band converted to sleeve 4/2019 w/hiatal hernia repair\par \par Obesity Comorbidities: SIMA, PVD, AFib\par \par Obesity Comorbidity resolution or improvement: \par \par Anti-Obesity Medications: None\par \par Obesity Medication Side Effects: N/A

## 2020-09-14 ENCOUNTER — APPOINTMENT (OUTPATIENT)
Dept: ELECTROPHYSIOLOGY | Facility: CLINIC | Age: 60
End: 2020-09-14
Payer: COMMERCIAL

## 2020-09-14 PROCEDURE — G2066: CPT

## 2020-09-14 PROCEDURE — 93298 REM INTERROG DEV EVAL SCRMS: CPT

## 2020-09-25 ENCOUNTER — APPOINTMENT (OUTPATIENT)
Dept: DISASTER EMERGENCY | Facility: CLINIC | Age: 60
End: 2020-09-25

## 2020-09-25 LAB — SARS-COV-2 N GENE NPH QL NAA+PROBE: NOT DETECTED

## 2020-09-27 ENCOUNTER — TRANSCRIPTION ENCOUNTER (OUTPATIENT)
Age: 60
End: 2020-09-27

## 2020-09-28 ENCOUNTER — RESULT REVIEW (OUTPATIENT)
Age: 60
End: 2020-09-28

## 2020-09-28 ENCOUNTER — OUTPATIENT (OUTPATIENT)
Dept: OUTPATIENT SERVICES | Facility: HOSPITAL | Age: 60
LOS: 1 days | End: 2020-09-28
Payer: COMMERCIAL

## 2020-09-28 DIAGNOSIS — D50.0 IRON DEFICIENCY ANEMIA SECONDARY TO BLOOD LOSS (CHRONIC): ICD-10-CM

## 2020-09-28 DIAGNOSIS — Z98.84 BARIATRIC SURGERY STATUS: Chronic | ICD-10-CM

## 2020-09-28 DIAGNOSIS — Z95.818 PRESENCE OF OTHER CARDIAC IMPLANTS AND GRAFTS: Chronic | ICD-10-CM

## 2020-09-28 DIAGNOSIS — K62.5 HEMORRHAGE OF ANUS AND RECTUM: ICD-10-CM

## 2020-09-28 PROCEDURE — 88305 TISSUE EXAM BY PATHOLOGIST: CPT | Mod: 26

## 2020-09-28 PROCEDURE — C1889: CPT

## 2020-09-28 PROCEDURE — 45385 COLONOSCOPY W/LESION REMOVAL: CPT | Mod: PT

## 2020-09-28 PROCEDURE — 88305 TISSUE EXAM BY PATHOLOGIST: CPT

## 2020-09-29 ENCOUNTER — APPOINTMENT (OUTPATIENT)
Dept: VASCULAR SURGERY | Facility: CLINIC | Age: 60
End: 2020-09-29
Payer: COMMERCIAL

## 2020-09-29 VITALS
HEART RATE: 55 BPM | DIASTOLIC BLOOD PRESSURE: 75 MMHG | BODY MASS INDEX: 44.41 KG/M2 | WEIGHT: 293 LBS | SYSTOLIC BLOOD PRESSURE: 110 MMHG | HEIGHT: 68 IN

## 2020-09-29 PROCEDURE — 99213 OFFICE O/P EST LOW 20 MIN: CPT

## 2020-09-29 NOTE — PHYSICAL EXAM
[JVD] : no jugular venous distention  [Normal Breath Sounds] : Normal breath sounds [Normal Rate and Rhythm] : normal rate and rhythm [2+] : left 2+ [Ankle Swelling (On Exam)] : present [Ankle Swelling On The Right] : mild [Varicose Veins Of Lower Extremities] : present [Varicose Veins Of The Right Leg] : of the right leg [] : bilaterally [Ankle Swelling Bilaterally] : severe [Abdomen Tenderness] : ~T ~M No abdominal tenderness [No Rash or Lesion] : No rash or lesion [Alert] : alert [Calm] : calm [de-identified] : Appears well

## 2020-09-29 NOTE — HISTORY OF PRESENT ILLNESS
[FreeTextEntry1] : 60-year-old female status post ablation of left greater saphenous vein for a lateral malleolus ulcer.  Patient reports slow improvement in healing.  She states she does continue to wear compression stockings.  She is here for follow-up.

## 2020-09-29 NOTE — ASSESSMENT
[FreeTextEntry1] : At this time there is mild improvement in the lateral malleolus ulcer.  We will continue with compression.  Patient does have some insufficiency in her left lesser saphenous vein.  However, the vein is still small in size.  At this time would not proceed with ablation however, if still continues to fail to heal may consider ablation.

## 2020-09-30 ENCOUNTER — TRANSCRIPTION ENCOUNTER (OUTPATIENT)
Age: 60
End: 2020-09-30

## 2020-10-06 ENCOUNTER — APPOINTMENT (OUTPATIENT)
Dept: BARIATRICS/WEIGHT MGMT | Facility: CLINIC | Age: 60
End: 2020-10-06
Payer: COMMERCIAL

## 2020-10-06 ENCOUNTER — APPOINTMENT (OUTPATIENT)
Dept: BARIATRICS | Facility: CLINIC | Age: 60
End: 2020-10-06
Payer: COMMERCIAL

## 2020-10-06 VITALS
HEIGHT: 68 IN | DIASTOLIC BLOOD PRESSURE: 80 MMHG | BODY MASS INDEX: 44.41 KG/M2 | HEART RATE: 51 BPM | TEMPERATURE: 96.6 F | WEIGHT: 293 LBS | SYSTOLIC BLOOD PRESSURE: 120 MMHG | OXYGEN SATURATION: 98 %

## 2020-10-06 VITALS
OXYGEN SATURATION: 99 % | HEART RATE: 51 BPM | SYSTOLIC BLOOD PRESSURE: 98 MMHG | DIASTOLIC BLOOD PRESSURE: 60 MMHG | HEIGHT: 68 IN | WEIGHT: 293 LBS | BODY MASS INDEX: 44.41 KG/M2

## 2020-10-06 PROCEDURE — 99214 OFFICE O/P EST MOD 30 MIN: CPT

## 2020-10-06 NOTE — HISTORY OF PRESENT ILLNESS
[FreeTextEntry1] : 60F PMH Obesity (s/p lap band converted to sleeve 4/2019 w/hiatal hernia repair), GERD, SIMA, AFib on eliquis, PVD who presents for follow-up to weight management.\par \par She was initially seen at last visit (9/1/20) 5 weeks ago as referral from bariatric with weight regain s/p lap band w/converted sleeve. At that visit she was reporting weight of 299 lbs (subsequent clinic weights of 303 lbs).  \par \par Weight Today: 301 lbs, 9.6 oz. 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 Minimizing alcohol (replace with herbal tea?), aiming for a 12 hour fast (coffee additives), using food log. \par She hasn't started intermittent fasting, has been logging emotions/thoughts but not food currently, she feels her main issue continues to be eating late at night (late dinner and is very hungry, snacking after she gets home when she's not hungry).\par \par She is taking Topiramate 50 mg QHS with no noted side effects.\par \par Exercise: Getting 8-12k steps per day, mostly on the job but still manages to on days off. Little formal exercise, uses arm weights \par \par Sleep: wears CPAP, wakes up to pee and has difficulty falling asleep. Overall poor sleep and attributes to age.

## 2020-10-06 NOTE — ASSESSMENT
[FreeTextEntry1] : 60F PMH Obesity (s/p lap band converted to sleeve 4/2019 w/hiatal hernia repair), GERD, SIMA, AFib on eliquis, PVD who presents for follow-up to weight management.\par \par # Obesity (s/p lap band converted to sleeve 4/2019), c/b GERD, SIMA, AFib on Eliquis, and PVD. In-office weight of 301 lbs 9.6 oz (BMI 45.86) with 1.5 lb wt loss since recent in-clinic weights. 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 She has been on Topiramate for 1 month (on 50 mg QHS now) and tolerating it well with no noted side effects. \par - Restart food log\par - Discussed night-eating strategies, may take Topiramate a few hours before bed, try engaging in alternative activities (reading, sudoku) at night when she would go to the fridge. \par - Will discuss alcohol and weight loss at next visit.\par - c/w tracking steps, goal >10k/d\par - C/w upper body exercises, can consider lower body exercises if stable to increase muscle involvement and calorie burn.\par - C/w CPAP \par \par \par Bariatric Surgery History: lap band converted to sleeve 4/2019 w/hiatal hernia repair\par \par Obesity Comorbidities: SIMA, PVD, AFib\par \par Obesity Comorbidity resolution or improvement: \par \par Anti-Obesity Medications: Topiramate\par \par Obesity Medication Side Effects: None

## 2020-10-14 NOTE — REASON FOR VISIT
[Morbid Obesity (BMI>40)] : morbid obesity (bmi>40) [Follow-Up Visit] : a follow-up visit for [S/P Bariatric Surgery] : s/p bariatric surgery

## 2020-10-15 NOTE — ASSESSMENT
[FreeTextEntry1] : 59 yo  F s/p single stage conversion - removal of lap band and port converted to lap sleeve gastrectomy with intra op EGD. Weight stable  since last visit.Current weight is 303 lbs. History of maladaptive eating patterns / making poor food choices. \par \par Instructed to continue multivitamins and continue protein and liquid intake as instructed.\par Call for any questions or concerns.\par Script given to have routine blood work performed prior to next visit.\par \par Nutritional counseling has been provided. The patient is encouraged to remain calorie conscious and continue a low fat, low carbohydrate, protein focus diet. Pt encouraged to participate in a daily exercise regimen incorporating cardio and  strength training. Will avoid ETOH/ snacking between meals/ make healthier food choices.\par \par Plan to follow up with obesity medicine in November 2020/ will schedule follow up visit with nutritionist as needed. \par \par Return to office in January 2021 or earlier if any concerns.Plan to have routine blood work performed prior to next visit.

## 2020-10-15 NOTE — PHYSICAL EXAM
[Obese, well nourished, in no acute distress] : obese, well nourished, in no acute distress [Normal] : affect appropriate [de-identified] : normoactive bowel sounds, soft and non tender, no hepatosplenomegaly or masses appreciated.

## 2020-10-15 NOTE — HISTORY OF PRESENT ILLNESS
[Procedure: ___] : Procedure performed: [unfilled]  [Date of Surgery: ___] : Date of Surgery:   [unfilled] [Surgeon Name:   ___] : Surgeon Name: Dr. CIFUENTES [Pre-Op Weight ___] : Pre-op weight was [unfilled] lbs [de-identified] : 61 yo  F s/p single stage conversion - removal of lap band and port converted to lap sleeve gastrectomy with intra op EGD. Weight stable  since last visit.Current weight is 303 lbs.  History of maladaptive eating patterns- making poor food choices / consuming - history of consuming wine at night / history of Night time snacking on cheese , popcorn, etc.Pt indicated that she was having a difficulty time during the peak of COVID Pandemic. Pt states she has improved in these behavioral patterns since last visit and is making healthier food choices. Pt continues to work on making changes and working on consuming consistent protein focus meals and consuming a sufficient amount of zero calorie liquid per day.Pt has been prescribed topiramate 50 mg as per obesity medicine - tolerating with no side effects. Patient is taking vitamin supplements as directed. No change in BM. No reflux symptoms. Exercising regularly incorporating cardio. Plan to increase strength training.Planning to schedule a follow up visit with nutritionist/ psych next month. Most recent labs were performed on 1/14/2020. Pt is requesting a script for repeat blood work to be performed prior to next visit.

## 2020-10-15 NOTE — REVIEW OF SYSTEMS
[Recent Change In Weight] : ~T recent weight change [Negative] : Heme/Lymph [Fever] : no fever [Chills] : no chills [Chest Pain] : no chest pain [Dysphagia] : no dysphagia [Lower Ext Edema] : no lower extremity edema [Palpitations] : no palpitations [Wheezing] : no wheezing [SOB on Exertion] : no shortness of breath during exertion [Abdominal Pain] : no abdominal pain [Vomiting] : no vomiting [Constipation] : no constipation [Diarrhea] : no diarrhea [Reflux/Heartburn] : no reflux/ heartburn [FreeTextEntry2] : weight stable since last visit.

## 2020-10-19 ENCOUNTER — APPOINTMENT (OUTPATIENT)
Dept: ELECTROPHYSIOLOGY | Facility: CLINIC | Age: 60
End: 2020-10-19
Payer: COMMERCIAL

## 2020-10-19 PROCEDURE — 93298 REM INTERROG DEV EVAL SCRMS: CPT

## 2020-10-19 PROCEDURE — G2066: CPT

## 2020-10-27 ENCOUNTER — APPOINTMENT (OUTPATIENT)
Dept: WOUND CARE | Facility: CLINIC | Age: 60
End: 2020-10-27
Payer: COMMERCIAL

## 2020-10-27 VITALS — SYSTOLIC BLOOD PRESSURE: 124 MMHG | TEMPERATURE: 97.6 F | HEART RATE: 58 BPM | DIASTOLIC BLOOD PRESSURE: 76 MMHG

## 2020-10-27 DIAGNOSIS — Z87.891 PERSONAL HISTORY OF NICOTINE DEPENDENCE: ICD-10-CM

## 2020-10-27 DIAGNOSIS — M79.672 PAIN IN LEFT FOOT: ICD-10-CM

## 2020-10-27 DIAGNOSIS — Z82.49 FAMILY HISTORY OF ISCHEMIC HEART DISEASE AND OTHER DISEASES OF THE CIRCULATORY SYSTEM: ICD-10-CM

## 2020-10-27 PROCEDURE — 99072 ADDL SUPL MATRL&STAF TM PHE: CPT

## 2020-10-27 PROCEDURE — 99203 OFFICE O/P NEW LOW 30 MIN: CPT | Mod: 25

## 2020-10-27 PROCEDURE — 11042 DBRDMT SUBQ TIS 1ST 20SQCM/<: CPT

## 2020-10-27 PROCEDURE — 99214 OFFICE O/P EST MOD 30 MIN: CPT | Mod: 25

## 2020-10-27 PROCEDURE — 93922 UPR/L XTREMITY ART 2 LEVELS: CPT

## 2020-10-27 NOTE — HISTORY OF PRESENT ILLNESS
[FreeTextEntry1] : Ms. ASHANTI FLOWER   presents to the office with a wound for 10 months duration.  \par The wound is located on  the left lower leg.  Pt feels that the wound resulted from skin irritation due to her wearing booties. had shave biopsy in feb 2020- benign skin left ankle\par  The patient has complaints of tenderness to the wound site upon palpation.  \par  The patient has been dressing the wound with topical steroids, bacitracin, telfa, paper tape.\par   The patient denies fevers or chills. \par  The patient has localized pain to the wound upon dressing changes. \par  The patient has no other complaints or associated symptoms.\par

## 2020-10-27 NOTE — PHYSICAL EXAM
[Normal Breath Sounds] : Normal breath sounds [Normal Rate and Rhythm] : normal rate and rhythm [2+] : right 2+ [0] : left 0 [Ankle Swelling (On Exam)] : present [Ankle Swelling On The Left] : of the left ankle [Varicose Veins Of Lower Extremities] : bilaterally [] : bilaterally [Skin Ulcer] : ulcer [Alert] : alert [Oriented to Person] : oriented to person [Oriented to Place] : oriented to place [Oriented to Time] : oriented to time [Calm] : calm [Please See PDF for Tissue Analytics] : Please See PDF for [employer] Analytics. [JVD] : no jugular venous distention  [Abdomen Tenderness] : ~T ~M No abdominal tenderness [de-identified] : nad mo [de-identified] : jed [de-identified] : rom intact [FreeTextEntry1] : left lat ankle [FreeTextEntry5] : 1 mm deeper [TWNoteComboBox7] : Galile[employer]

## 2020-10-27 NOTE — REVIEW OF SYSTEMS
[Negative] : Endocrine [FreeTextEntry3] : Pt wears progressive glasses [de-identified] : Left lower extremity ulceration noted

## 2020-10-27 NOTE — ASSESSMENT
[FreeTextEntry1] : 60 yr old [place] afib, lagb, sleeve , radio venous ablation left gsv, hh repair\par complexitymod- reviewed previous us/ labs, films- no xr only us\par risk low- not on ac, tolerated sharp debridement well\par time30\par ordered for dressing tlefa, santyl, compression\par unna placed today, will switch out to sock\par fup 2 week

## 2020-10-27 NOTE — PLAN
[FreeTextEntry1] : Wound Assessment and Plan:\par \par The patient presents with a wound to the left lower extreity.  Swelling noted to the extremity.  Pt is s/p vein ablation on 07/2020.\par \par KELLY/PVR and standing venous reflux study scheduled.\par No clinical sign of infection\par Recommendation:\par \par Apply lidocaine or topical anesthetic if needed to reduce pain upon washing the wound.\par Wash wound with ----0.9% saline/ Dakins 0.125% or Dove skin sensitive soap and clean water \par Apply ---- / santyl/  \par Apply ----multi-layer compression bandage/ compression stocking/ ACE bandage\par Change dressing ---daily/ every other day/ 3 times per week.\par Leg elevation as tolerated\par Encouraged ambulation or exercise.\par Optimization of nutrition.\par \par \par \par \par Wound supplies ordered via DME\par Patient given contact information to DME\par \par Follow up appointment scheduled for -----\par \par TeleHealth Services discussed with the patient and/or family.  Discharge instructions given including download of Cathryn information regarding:\par 1)  Sur[facility] [employer] [facility] to obtain medical records\par 2)  [facility] to conduct Face-to-Face TeleHealth visit\par 3)  [employer] for the Patient (patient takes a picture of their wound which is sent to the patient's chart for review)

## 2020-11-09 ENCOUNTER — APPOINTMENT (OUTPATIENT)
Dept: BARIATRICS/WEIGHT MGMT | Facility: CLINIC | Age: 60
End: 2020-11-09
Payer: COMMERCIAL

## 2020-11-09 VITALS
BODY MASS INDEX: 45.92 KG/M2 | HEART RATE: 53 BPM | WEIGHT: 293 LBS | SYSTOLIC BLOOD PRESSURE: 118 MMHG | DIASTOLIC BLOOD PRESSURE: 70 MMHG | OXYGEN SATURATION: 98 %

## 2020-11-09 PROCEDURE — 99072 ADDL SUPL MATRL&STAF TM PHE: CPT

## 2020-11-09 PROCEDURE — 99214 OFFICE O/P EST MOD 30 MIN: CPT | Mod: 25

## 2020-11-09 NOTE — HISTORY OF PRESENT ILLNESS
[FreeTextEntry1] : 60F PMH Obesity (s/p lap band converted to sleeve 4/2019 w/hiatal hernia repair), GERD, SIMA, AFib on eliquis, PVD who presents for follow-up to weight management.\par \par She was initially seen on 9/1/20 as referral from bariatric with weight regain s/p lap band w/converted sleeve. At that visit she was reporting weight of 299 lbs (subsequent clinic weights of 303 lbs). \par \par Weight Today: 302 lbs\par Weight last visit (10/6/20): 301 lbs, 9.6 oz. 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 Discussed aiming for 12 hour fast (including coffee additives), and reducing night-time alcohol. Also discussed starting a food log, she is already keeping a log of emotions/thoughts.\par \par She started Topiramate in early September and is now taking 100 mg QHS. We discussed taking it a few hours before bed at last visit to reduce night eating, and strategies to reduce night snacking behaviors. She notes no side effects.\par \par Exercise: No formal exercise but bought a balancing board she is starting to use which exercises core muscles and is tiring. She is getting >8k steps most days, particularly work days. \par \par Sleep: Wears her CPAP most nights except when she occasionally forgets. Estimating 7 hours. Trying to get to bed at midnight every night.\par \lon Has 2-3 drinks (usually wine, occasionally others such as margharita) about 5 days per week, it is part of her social life, something she does with her  and other people.

## 2020-11-09 NOTE — ASSESSMENT
[FreeTextEntry1] : 60F PMH Obesity (s/p lap band converted to sleeve 4/2019 w/hiatal hernia repair), GERD, SIMA, AFib on eliquis, PVD who presents for follow-up to weight management.\par \par # Obesity (s/p lap band converted to sleeve 4/2019 w/hiatal hernia repair) c/b SIMA, AFib, PVD, GERD: She was initially seen on 9/1/20 as referral from bariatric with weight regain s/p lap band w/converted sleeve. She has an irregular work schedule and thus eating/sleeping schedule which partly leads to night-time snacking. She also drinks 2-3 alcoholic beverages ~5 days per week which may lead to night-snacking while also adding calories and affecting sleep quality. Weight has remained unchanged.\par - Discussed reducing the number of nights per week consuming alcohol, patient plans to eliminate alcohol Mon - Wed each week (which she states will likely actually be Mon - Fri bc of her schedule) \par - Discussed aiming for >12 hour fast, aiming for 1 day per week to have longer fast.\par - Goal to use balance board at least 2 days per week for added exercise.\par - C/w Topiramate 100 mg for now. \par - C/w CPAP\par \par \par Bariatric Surgery History: lap band converted to sleeve 4/2019 w/hiatal hernia repair\par \par Obesity Comorbidities: SIMA, PVD, AFib\par \par Obesity Comorbidity resolution or improvement: \par \par Anti-Obesity Medications: Topiramate\par \par Obesity Medication Side Effects: None.

## 2020-11-11 ENCOUNTER — APPOINTMENT (OUTPATIENT)
Dept: WOUND CARE | Facility: CLINIC | Age: 60
End: 2020-11-11
Payer: COMMERCIAL

## 2020-11-11 PROCEDURE — 93923 UPR/LXTR ART STDY 3+ LVLS: CPT

## 2020-11-11 PROCEDURE — 93970 EXTREMITY STUDY: CPT

## 2020-11-19 ENCOUNTER — APPOINTMENT (OUTPATIENT)
Dept: WOUND CARE | Facility: CLINIC | Age: 60
End: 2020-11-19
Payer: COMMERCIAL

## 2020-11-19 VITALS — BODY MASS INDEX: 44.41 KG/M2 | TEMPERATURE: 96.3 F | WEIGHT: 293 LBS | HEIGHT: 68 IN

## 2020-11-19 DIAGNOSIS — B35.3 TINEA PEDIS: ICD-10-CM

## 2020-11-19 PROCEDURE — 99213 OFFICE O/P EST LOW 20 MIN: CPT | Mod: 25

## 2020-11-19 PROCEDURE — 11042 DBRDMT SUBQ TIS 1ST 20SQCM/<: CPT

## 2020-11-19 NOTE — PLAN
[FreeTextEntry1] : Wound Assessment and Plan:\par \par The patient presents with a wound to the left lower extremity.  Swelling noted to the extremity.  Pt is s/p vein ablation on 07/2020.\par \par KELLY/PVR and standing venous reflux study scheduled.\par No clinical sign of infection\par Recommendation:\par \par Apply lidocaine or topical anesthetic if needed to reduce pain upon washing the wound.\par Wash wound with ----0.9% saline/ Dakins 0.125% or Dove skin sensitive soap and clean water \par Apply ---- / santyl/  \par Apply ----multi-layer compression bandage/ compression stocking/ ACE bandage\par Change dressing ---daily/ every other day/ 3 times per week.\par Leg elevation as tolerated\par Encouraged ambulation or exercise.\par Optimization of nutrition.\par \par \par \par \par Wound supplies ordered via DME\par Patient given contact information to DME\par \par Follow up appointment scheduled for -----\par \par TeleHealth Services discussed with the patient and/or family.  Discharge instructions given including download of Cathryn information regarding:\par 1)  Suraj Follow [employer] [facility] to obtain medical records\par 2)  [facility] to conduct Face-to-Face TeleHealth visit\par 3)  [facility] [employer] for the Patient (patient takes a picture of their wound which is sent to the patient's chart for review)

## 2020-11-19 NOTE — REVIEW OF SYSTEMS
[Negative] : Endocrine [FreeTextEntry3] : Pt wears progressive glasses [de-identified] : Left lower extremity ulceration noted

## 2020-11-19 NOTE — ASSESSMENT
[FreeTextEntry1] : 60 yr old [place] afib, lagb, sleeve , radio venous ablation left gsv, hh repair\par complexitymod- reviewed previous us/ labs, films- no xr only us\par us shows venous in right, left is still closed\par ann ok\par risk low- not on ac, tolerated sharp debridement well\par time30\par ordered for dressing tlefa, santyl, compression/ betadibne wound/ periwound\par unna placed today, will switch out to sock\par fup 2 week

## 2020-11-19 NOTE — PHYSICAL EXAM
[JVD] : no jugular venous distention  [Normal Breath Sounds] : Normal breath sounds [Normal Rate and Rhythm] : normal rate and rhythm [2+] : right 2+ [0] : left 0 [Ankle Swelling (On Exam)] : present [Ankle Swelling On The Left] : of the left ankle [Varicose Veins Of Lower Extremities] : bilaterally [] : bilaterally [Abdomen Tenderness] : ~T ~M No abdominal tenderness [Skin Ulcer] : ulcer [Alert] : alert [Oriented to Person] : oriented to person [Oriented to Place] : oriented to place [Oriented to Time] : oriented to time [Calm] : calm [de-identified] : nad mo [de-identified] : jed [de-identified] : rom intact [Please See PDF for [employer]] : Please See PDF for [employer]. [FreeTextEntry1] : left lat ankle [FreeTextEntry5] : 1 mm deeper [TWNoteComboBox7] : Galileo

## 2020-11-23 ENCOUNTER — APPOINTMENT (OUTPATIENT)
Dept: ELECTROPHYSIOLOGY | Facility: CLINIC | Age: 60
End: 2020-11-23

## 2020-11-24 ENCOUNTER — FORM ENCOUNTER (OUTPATIENT)
Age: 60
End: 2020-11-24

## 2020-11-24 ENCOUNTER — APPOINTMENT (OUTPATIENT)
Dept: VASCULAR SURGERY | Facility: CLINIC | Age: 60
End: 2020-11-24
Payer: COMMERCIAL

## 2020-11-24 VITALS
SYSTOLIC BLOOD PRESSURE: 126 MMHG | WEIGHT: 293 LBS | HEART RATE: 53 BPM | DIASTOLIC BLOOD PRESSURE: 78 MMHG | BODY MASS INDEX: 44.41 KG/M2 | HEIGHT: 68 IN

## 2020-11-24 VITALS — TEMPERATURE: 97.6 F

## 2020-11-24 PROCEDURE — 99213 OFFICE O/P EST LOW 20 MIN: CPT

## 2020-11-24 NOTE — HISTORY OF PRESENT ILLNESS
[FreeTextEntry1] : 59 yo female with history of venous insufficiency s/p left gsv ablation and has been following with wound care for slow healing left lower extremity venous stasis ulcer.  pt states that the ulcer is painful and has been wearing compression and 2 weeks of unna boots with the wound care.  pt denies any history of claudications

## 2020-11-24 NOTE — ASSESSMENT
[FreeTextEntry1] : 61 yo female with history of venous insufficiency s/p left gsv ablation and has been following with wound care for slow healing left lower extremity venous stasis ulcer. \par \par venous duplex noted to have small saphenous insufficiency but was noted to be small in size \par would consider ablating the lesser saphenous given slow healing ulcer for about 1 year \par pt would like to continue with local wound care and will follow up in 1-2 months if ulcer still present will consider ablation

## 2020-11-24 NOTE — PHYSICAL EXAM
[Ankle Swelling (On Exam)] : present [Ankle Swelling On The Left] : moderate [Varicose Veins Of Lower Extremities] : bilaterally [] : bilaterally [Ankle Swelling Bilaterally] : severe [Alert] : alert [Calm] : calm [JVD] : no jugular venous distention  [de-identified] : appears well [de-identified] : shallow based ulcer over the left lateral malleolus

## 2020-12-07 ENCOUNTER — OUTPATIENT (OUTPATIENT)
Dept: OUTPATIENT SERVICES | Facility: HOSPITAL | Age: 60
LOS: 1 days | End: 2020-12-07

## 2020-12-07 ENCOUNTER — APPOINTMENT (OUTPATIENT)
Dept: PRIMARY CARE | Facility: HOSPITAL | Age: 60
End: 2020-12-07

## 2020-12-07 VITALS
TEMPERATURE: 97.9 F | OXYGEN SATURATION: 100 % | HEART RATE: 64 BPM | SYSTOLIC BLOOD PRESSURE: 113 MMHG | BODY MASS INDEX: 44.41 KG/M2 | HEIGHT: 68 IN | WEIGHT: 293 LBS | DIASTOLIC BLOOD PRESSURE: 81 MMHG

## 2020-12-07 DIAGNOSIS — Z98.84 BARIATRIC SURGERY STATUS: Chronic | ICD-10-CM

## 2020-12-07 DIAGNOSIS — Z20.828 CONTACT WITH AND (SUSPECTED) EXPOSURE TO OTHER VIRAL COMMUNICABLE DISEASES: ICD-10-CM

## 2020-12-07 DIAGNOSIS — Z95.818 PRESENCE OF OTHER CARDIAC IMPLANTS AND GRAFTS: Chronic | ICD-10-CM

## 2020-12-08 LAB — SARS-COV-2 N GENE NPH QL NAA+PROBE: NOT DETECTED

## 2020-12-28 ENCOUNTER — APPOINTMENT (OUTPATIENT)
Dept: ELECTROPHYSIOLOGY | Facility: CLINIC | Age: 60
End: 2020-12-28
Payer: COMMERCIAL

## 2020-12-28 PROCEDURE — G2066: CPT

## 2020-12-28 PROCEDURE — 93298 REM INTERROG DEV EVAL SCRMS: CPT

## 2021-01-05 ENCOUNTER — APPOINTMENT (OUTPATIENT)
Dept: WOUND CARE | Facility: CLINIC | Age: 61
End: 2021-01-05
Payer: COMMERCIAL

## 2021-01-05 ENCOUNTER — APPOINTMENT (OUTPATIENT)
Dept: BARIATRICS/WEIGHT MGMT | Facility: CLINIC | Age: 61
End: 2021-01-05
Payer: COMMERCIAL

## 2021-01-05 VITALS
HEIGHT: 68 IN | DIASTOLIC BLOOD PRESSURE: 70 MMHG | WEIGHT: 293 LBS | SYSTOLIC BLOOD PRESSURE: 108 MMHG | BODY MASS INDEX: 44.41 KG/M2 | HEART RATE: 52 BPM | OXYGEN SATURATION: 98 %

## 2021-01-05 VITALS
SYSTOLIC BLOOD PRESSURE: 125 MMHG | HEART RATE: 54 BPM | TEMPERATURE: 97.3 F | RESPIRATION RATE: 16 BRPM | DIASTOLIC BLOOD PRESSURE: 78 MMHG

## 2021-01-05 PROCEDURE — 99072 ADDL SUPL MATRL&STAF TM PHE: CPT

## 2021-01-05 PROCEDURE — 99214 OFFICE O/P EST MOD 30 MIN: CPT

## 2021-01-05 PROCEDURE — 99213 OFFICE O/P EST LOW 20 MIN: CPT

## 2021-01-05 NOTE — HISTORY OF PRESENT ILLNESS
[FreeTextEntry1] : 60F PMH Obesity (s/p lap band converted to sleeve 4/2019 w/hiatal hernia repair), GERD, SIMA, AFib on eliquis, PVD who presents for follow-up to weight management.\par \par She was initially seen on 9/1/20 as referral from bariatric with weight regain s/p lap band w/converted sleeve. At that visit she was reporting weight of 299 lbs (subsequent clinic weights of 303 lbs). \par \par Weight Today: 302 lbs, BMI 45.92\par Weight last visit (11/9/20): 302 lbs. 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 Discussed aiming for 12 hour fast (including coffee additives), and reducing night-time alcohol. Also discussed starting a food log, she is already keeping a log of emotions/thoughts.\par \par She started Topiramate in early September and is now taking 100 mg QHS. We discussed taking it a few hours before bed at last visit to reduce night eating, and strategies to reduce night snacking behaviors. She notes no side effects.\par \par Exercise: Walking dog. \par \par Sleep: Estimates 7 hours, wears CPAP most nights. She notes she is waking up in the middle of the night. \par She is reducing the frequency and amount of alcohol consumption. She tends to eat and drink between 9 - 11 pm when she and her  sit on the couch to watch TV in the evening.

## 2021-01-05 NOTE — ASSESSMENT
[FreeTextEntry1] : 60F PMH Obesity (s/p lap band converted to sleeve 4/2019 w/hiatal hernia repair), GERD, SIMA, AFib on eliquis, PVD who presents for follow-up to weight management.\par \par # Obesity (s/p lap band converted to sleeve w/hiatal hernia repair) c/b SIMA, AFib, PVD, and GERD: Weight today 302 lbs, initial visit weight (9/1/20) ~304 lbs). She has fluctuated between 300 and 304 lbs at each visit. She has had no side effects from Topiramate 100 mg but does not note any effect at all. \par - C/w food log\par - C/w daily activity\par - Minimize snacking and sweets now that holidays are over\par - Discussed with patient that evening alcohol may be having a multi-pronged effect against her weight loss attempts (liquid calories, SIMA, interferes with quality and depth of sleep, may be contributing to awakenings) and that she should minimize it with the medication. Patient expressed understanding with goal to minimize alcohol consumption.\par - C/w CPAP\par - Discussed night-eating habits. Recommended referring to Psychology for CBT. C/w topiramate for now. She may require alternative activity in evenings to avoid night eating/drinking, or replacing with alternatives such as herbal tea and crunchy vegetables. \par - Will discuss IF at future visit\par - Will increase Topiramate to 150 mg since she is experiencing no therapeutic or adverse effects. We discussed trying GLP-1 as an alternative, however given that she is on Omeprazole 20 mg and famotidine with some breakthrough symptoms, I would like her to have more optimal control over her GERD. We discussed that avoiding night-eating, alcohol, and trigger foods would benefit.\par \par \par \par Bariatric Surgery History: lap band converted to sleeve 4/2019 w/hiatal hernia repair\par \par Obesity Comorbidities: SIMA, PVD, AFib\par \par Obesity Comorbidity resolution or improvement: \par \par Anti-Obesity Medications: Topiramate\par \par Obesity Medication Side Effects: None.

## 2021-01-07 NOTE — ASSESSMENT
[FreeTextEntry1] : 60 yr old [place] afib, lagb, sleeve , radio venous ablation left gsv, hh repair\par complexitymod- reviewed previous us/ labs, films- no xr only us\par us shows venous in right, left is still closed\par ann ok\par risk low- not on ac closed-healed, moisturizer applied\par time30\par  will switch out to sock\par fup prn

## 2021-01-07 NOTE — REVIEW OF SYSTEMS
[Negative] : Endocrine [FreeTextEntry3] : Pt wears progressive glasses [de-identified] : Left lower extremity ulceration noted

## 2021-01-07 NOTE — PLAN
[FreeTextEntry1] : Wound Assessment and Plan:\par \par The patient presents with a wound to the left lower extremity.  Swelling noted to the extremity.  Pt is s/p vein ablation on 07/2020.\par \par KELLY/PVR and standing venous reflux study scheduled.\par No clinical sign of infection\par Recommendation:\par \par Apply lidocaine or topical anesthetic if needed to reduce pain upon washing the wound.\par Wash wound with ----0.9% saline/ Dakins 0.125% or Dove skin sensitive soap and clean water \par Apply ---- / santyl/  \par Apply ----multi-layer compression bandage/ compression stocking/ ACE bandage\par Change dressing ---daily/ every other day/ 3 times per week.\par Leg elevation as tolerated\par Encouraged ambulation or exercise.\par Optimization of nutrition.\par \par \par \par \par Wound supplies ordered via DME\par Patient given contact information to DME\par \par Follow up appointment scheduled for -----\par \par TeleHealth Services discussed with the patient and/or family.  Discharge instructions given including download of Cathryn information regarding:\par 1)  [facility] [facility] to obtain medical records\par 2)  [facility] to conduct Face-to-Face TeleHealth visit\par 3)  [facility] [employer] for the Patient (patient takes a picture of their wound which is sent to the patient's chart for review)

## 2021-01-07 NOTE — PHYSICAL EXAM
[JVD] : no jugular venous distention  [Normal Breath Sounds] : Normal breath sounds [Normal Rate and Rhythm] : normal rate and rhythm [2+] : right 2+ [0] : left 0 [Ankle Swelling (On Exam)] : present [Ankle Swelling On The Left] : of the left ankle [Varicose Veins Of Lower Extremities] : present [] : bilaterally [Abdomen Tenderness] : ~T ~M No abdominal tenderness [Skin Ulcer] : ulcer [Alert] : alert [Oriented to Person] : oriented to person [Oriented to Place] : oriented to place [Oriented to Time] : oriented to time [Calm] : calm [de-identified] : nad mo [de-identified] : jed [de-identified] : rom intact [Please See PDF for Tissue Analytics] : Please See PDF for [employer]. [FreeTextEntry1] : left lat ankle [FreeTextEntry5] : 1 mm deeper [TWNoteComboBox7] : Galile[employer]

## 2021-01-07 NOTE — HISTORY OF PRESENT ILLNESS
[FreeTextEntry1] : Ms. ASHANTI FLOWER   presents to the office with a wound for 10 months duration.  \par now closed\par ordered for dressing tlefa, santyl, compression/ betadibne wound/ periwound\par The wound is located on  the left lower leg.  Pt feels that the wound resulted from skin irritation due to her wearing booties. had shave biopsy in feb 2020- benign skin left ankle\par  The patient has complaints of tenderness to the wound site upon palpation.  \par  The patient has been dressing the wound with topical steroids, bacitracin, telfa, paper tape.\par   The patient denies fevers or chills. \par  The patient has localized pain to the wound upon dressing changes. \par  The patient has no other complaints or associated symptoms.\par

## 2021-01-10 NOTE — HISTORY OF PRESENT ILLNESS
[FreeTextEntry8] : 60 F PCN allergy, PMH of HTN and no PSH [facility].\par \par States that  she just wants to get tested for COVID PCR before meeting her family members. Denies any fever, cough, sore throat or SOB. No loss of smell or taste, no chest tightness, or any GI related symptoms of nausea, vomiting or diarrhea. \par \par She works  in [employer]. She takes regular maintenance medications. Denies any chest pain, no chest palpitations or any respiratory distress\par \par

## 2021-01-10 NOTE — PHYSICAL EXAM
[No Acute Distress] : no acute distress [Well Nourished] : well nourished [Well Developed] : well developed [Well-Appearing] : well-appearing [Normal Sclera/Conjunctiva] : normal sclera/conjunctiva [PERRL] : pupils equal round and reactive to light [EOMI] : extraocular movements intact [Normal Outer Ear/Nose] : the outer ears and nose were normal in appearance [No JVD] : no jugular venous distention [Normal Oropharynx] : the oropharynx was normal [No Lymphadenopathy] : no lymphadenopathy [Supple] : supple [Thyroid Normal, No Nodules] : the thyroid was normal and there were no nodules present [No Respiratory Distress] : no respiratory distress  [No Accessory Muscle Use] : no accessory muscle use [Clear to Auscultation] : lungs were clear to auscultation bilaterally [Normal Rate] : normal rate  [Regular Rhythm] : with a regular rhythm [Normal S1, S2] : normal S1 and S2 [No Murmur] : no murmur heard [No Carotid Bruits] : no carotid bruits [No Varicosities] : no varicosities [No Abdominal Bruit] : a ~M bruit was not heard ~T in the abdomen [Pedal Pulses Present] : the pedal pulses are present [No Edema] : there was no peripheral edema [No Palpable Aorta] : no palpable aorta [No Extremity Clubbing/Cyanosis] : no extremity clubbing/cyanosis [Non Tender] : non-tender [Soft] : abdomen soft [Non-distended] : non-distended [No Masses] : no abdominal mass palpated [No HSM] : no HSM [Normal Bowel Sounds] : normal bowel sounds [Normal Anterior Cervical Nodes] : no anterior cervical lymphadenopathy [Normal Posterior Cervical Nodes] : no posterior cervical lymphadenopathy [No CVA Tenderness] : no CVA  tenderness [No Spinal Tenderness] : no spinal tenderness [No Joint Swelling] : no joint swelling [Grossly Normal Strength/Tone] : grossly normal strength/tone [No Rash] : no rash [Coordination Grossly Intact] : coordination grossly intact [No Focal Deficits] : no focal deficits [Normal Gait] : normal gait [Normal Affect] : the affect was normal [Normal Insight/Judgement] : insight and judgment were intact

## 2021-01-12 ENCOUNTER — APPOINTMENT (OUTPATIENT)
Dept: BARIATRICS | Facility: CLINIC | Age: 61
End: 2021-01-12
Payer: COMMERCIAL

## 2021-01-12 VITALS
TEMPERATURE: 96.6 F | BODY MASS INDEX: 44.41 KG/M2 | DIASTOLIC BLOOD PRESSURE: 76 MMHG | SYSTOLIC BLOOD PRESSURE: 104 MMHG | HEIGHT: 68 IN | WEIGHT: 293 LBS | HEART RATE: 51 BPM | OXYGEN SATURATION: 99 %

## 2021-01-12 LAB
25(OH)D3 SERPL-MCNC: 29.5 NG/ML
25(OH)D3 SERPL-MCNC: 31 NG/ML
ALBUMIN SERPL ELPH-MCNC: 4.4 G/DL
ALBUMIN SERPL ELPH-MCNC: 4.5 G/DL
ALP BLD-CCNC: 104 U/L
ALP BLD-CCNC: 106 U/L
ALT SERPL-CCNC: 13 U/L
ALT SERPL-CCNC: 16 U/L
ANION GAP SERPL CALC-SCNC: 11 MMOL/L
ANION GAP SERPL CALC-SCNC: 11 MMOL/L
AST SERPL-CCNC: 18 U/L
AST SERPL-CCNC: 20 U/L
BASOPHILS # BLD AUTO: 0.01 K/UL
BASOPHILS # BLD AUTO: 0.03 K/UL
BASOPHILS NFR BLD AUTO: 0.1 %
BASOPHILS NFR BLD AUTO: 0.5 %
BILIRUB SERPL-MCNC: 0.4 MG/DL
BILIRUB SERPL-MCNC: 0.4 MG/DL
BUN SERPL-MCNC: 12 MG/DL
BUN SERPL-MCNC: 12 MG/DL
CALCIUM SERPL-MCNC: 9.4 MG/DL
CALCIUM SERPL-MCNC: 9.4 MG/DL
CHLORIDE SERPL-SCNC: 105 MMOL/L
CHLORIDE SERPL-SCNC: 105 MMOL/L
CHOLEST SERPL-MCNC: 218 MG/DL
CHOLEST SERPL-MCNC: 219 MG/DL
CO2 SERPL-SCNC: 24 MMOL/L
CO2 SERPL-SCNC: 24 MMOL/L
CREAT SERPL-MCNC: 0.84 MG/DL
CREAT SERPL-MCNC: 0.86 MG/DL
CRP SERPL HS-MCNC: 1.33 MG/L
EOSINOPHIL # BLD AUTO: 0.12 K/UL
EOSINOPHIL # BLD AUTO: 0.14 K/UL
EOSINOPHIL NFR BLD AUTO: 1.8 %
EOSINOPHIL NFR BLD AUTO: 2.1 %
ESTIMATED AVERAGE GLUCOSE: 103 MG/DL
ESTIMATED AVERAGE GLUCOSE: 103 MG/DL
FERRITIN SERPL-MCNC: 76 NG/ML
FOLATE SERPL-MCNC: 13.6 NG/ML
FOLATE SERPL-MCNC: 14.8 NG/ML
GLUCOSE SERPL-MCNC: 88 MG/DL
GLUCOSE SERPL-MCNC: 88 MG/DL
HBA1C MFR BLD HPLC: 5.2 %
HBA1C MFR BLD HPLC: 5.2 %
HCT VFR BLD CALC: 41.9 %
HCT VFR BLD CALC: 42.5 %
HDLC SERPL-MCNC: 67 MG/DL
HDLC SERPL-MCNC: 67 MG/DL
HGB BLD-MCNC: 13.3 G/DL
HGB BLD-MCNC: 13.5 G/DL
IMM GRANULOCYTES NFR BLD AUTO: 0.1 %
IMM GRANULOCYTES NFR BLD AUTO: 0.2 %
INSULIN SERPL-MCNC: 5.5 UU/ML
IRON SATN MFR SERPL: 28 %
IRON SATN MFR SERPL: 29 %
IRON SERPL-MCNC: 95 UG/DL
IRON SERPL-MCNC: 95 UG/DL
LDLC SERPL CALC-MCNC: 130 MG/DL
LDLC SERPL CALC-MCNC: 132 MG/DL
LYMPHOCYTES # BLD AUTO: 1.46 K/UL
LYMPHOCYTES # BLD AUTO: 1.51 K/UL
LYMPHOCYTES NFR BLD AUTO: 21.8 %
LYMPHOCYTES NFR BLD AUTO: 22.8 %
MAN DIFF?: NORMAL
MAN DIFF?: NORMAL
MCHC RBC-ENTMCNC: 29.5 PG
MCHC RBC-ENTMCNC: 29.6 PG
MCHC RBC-ENTMCNC: 31.7 GM/DL
MCHC RBC-ENTMCNC: 31.8 GM/DL
MCV RBC AUTO: 92.8 FL
MCV RBC AUTO: 93.3 FL
MONOCYTES # BLD AUTO: 0.41 K/UL
MONOCYTES # BLD AUTO: 0.44 K/UL
MONOCYTES NFR BLD AUTO: 6.1 %
MONOCYTES NFR BLD AUTO: 6.6 %
NEUTROPHILS # BLD AUTO: 4.51 K/UL
NEUTROPHILS # BLD AUTO: 4.68 K/UL
NEUTROPHILS NFR BLD AUTO: 68.1 %
NEUTROPHILS NFR BLD AUTO: 69.8 %
NONHDLC SERPL-MCNC: 151 MG/DL
NONHDLC SERPL-MCNC: 152 MG/DL
PLATELET # BLD AUTO: 280 K/UL
PLATELET # BLD AUTO: 301 K/UL
POTASSIUM SERPL-SCNC: 4.6 MMOL/L
POTASSIUM SERPL-SCNC: 4.6 MMOL/L
PROT SERPL-MCNC: 6.7 G/DL
PROT SERPL-MCNC: 6.7 G/DL
RBC # BLD: 4.49 M/UL
RBC # BLD: 4.58 M/UL
RBC # FLD: 13.3 %
RBC # FLD: 13.4 %
SODIUM SERPL-SCNC: 140 MMOL/L
SODIUM SERPL-SCNC: 140 MMOL/L
T4 FREE SERPL-MCNC: 1 NG/DL
TIBC SERPL-MCNC: 332 UG/DL
TIBC SERPL-MCNC: 341 UG/DL
TRIGL SERPL-MCNC: 103 MG/DL
TRIGL SERPL-MCNC: 104 MG/DL
TSH SERPL-ACNC: 1.61 UIU/ML
TSH SERPL-ACNC: 1.72 UIU/ML
UIBC SERPL-MCNC: 237 UG/DL
UIBC SERPL-MCNC: 246 UG/DL
VIT A SERPL-MCNC: 59.3 UG/DL
VIT A SERPL-MCNC: 60 UG/DL
VIT B1 SERPL-MCNC: 159 NMOL/L
VIT B1 SERPL-MCNC: 162.5 NMOL/L
VIT B12 SERPL-MCNC: 491 PG/ML
VIT B12 SERPL-MCNC: 492 PG/ML
VIT B2 SERPL-MCNC: 210 UG/L
VIT B6 SERPL-MCNC: 14.5 UG/L
VIT B6 SERPL-MCNC: 17.5 UG/L
WBC # FLD AUTO: 6.62 K/UL
WBC # FLD AUTO: 6.71 K/UL
ZINC SERPL-MCNC: 102 UG/DL

## 2021-01-12 PROCEDURE — 99072 ADDL SUPL MATRL&STAF TM PHE: CPT

## 2021-01-12 PROCEDURE — 99214 OFFICE O/P EST MOD 30 MIN: CPT

## 2021-01-12 RX ORDER — CLOTRIMAZOLE AND BETAMETHASONE DIPROPIONATE 10; .5 MG/G; MG/G
1-0.05 CREAM TOPICAL TWICE DAILY
Qty: 2 | Refills: 3 | Status: COMPLETED | COMMUNITY
Start: 2020-08-18 | End: 2021-01-12

## 2021-01-12 RX ORDER — OMEPRAZOLE 40 MG/1
40 CAPSULE, DELAYED RELEASE ORAL
Qty: 30 | Refills: 2 | Status: ACTIVE | COMMUNITY
Start: 2021-01-12 | End: 1900-01-01

## 2021-01-12 RX ORDER — ACETAMINOPHEN 325 MG
TABLET ORAL
Refills: 0 | Status: COMPLETED | COMMUNITY
End: 2021-01-12

## 2021-01-12 RX ORDER — COLLAGENASE SANTYL 250 [ARB'U]/G
250 OINTMENT TOPICAL DAILY
Qty: 1 | Refills: 3 | Status: COMPLETED | COMMUNITY
Start: 2020-10-27 | End: 2021-01-12

## 2021-01-12 RX ORDER — CLOBETASOL PROPIONATE 0.05 G/100ML
0.05 LOTION TOPICAL DAILY
Qty: 1 | Refills: 0 | Status: COMPLETED | COMMUNITY
Start: 2020-11-19 | End: 2021-01-12

## 2021-01-12 NOTE — ASSESSMENT
[FreeTextEntry1] : 60 year old female s/p single stage revision LAGB to laparoscopic sleeve gastrectomy and hiatal hernia repair presents for follow up visit. She continues to struggle with weight regain and is working Obesity Medicine for medical weight management. The patient was encouraged to have a low fat, low carbohydrate and high protein diet, increase water intake and limit snacking and alcohol intake. For exercise, she was advised to walk 10,000 steps daily and to incorporate strength exercises as tolerated. Reviewed lab results and advised patient to take Calcium with Vit D - Vitamin D was borderline low. Chol remains slightly elevated - advised to speak with PCP. \par \par Nutrition and exercise counseling provided.\par Continue Flintstones Vitamins add Calcium with Vit D\par Increase omeprazole to 40 mg in AM and continue Famotidine at night\par Continue to work with Obesity Medicine\par Follow up with Dr. Michael\par Follow up with PCP about elevated CHOL\par Follow up in 3 months\par Call with any questions or concerns\par .

## 2021-01-12 NOTE — HISTORY OF PRESENT ILLNESS
[Procedure: ___] : Procedure performed: [unfilled]  [Date of Surgery: ___] : Date of Surgery:   [unfilled] [Surgeon Name:   ___] : Surgeon Name: Dr. CIFUENTES [Pre-Op Weight ___] : Pre-op weight was [unfilled] lbs [de-identified] : 60 year old female s/p s/p single stage revision LAGB to laparoscopic sleeve gastrectomy and hiatal hernia repair presents for follow up visit. She gained 2 lbs since last visit. Patient wants to get back on track with weight loss. Based on brief diet history, she is consuming the appropriate amount of protein, but still not sufficient water daily. She snacks nightly on buttered/plain popcorn and cookies. She drinks 2 glasses of wine three times a week and no longer snacking on cheese. She is continuing to work with Obesity Medicine and will be starting an increased dose of Topiramate. She takes one Flintstones chewable daily.  Patient is taking omeprazole 20 mg and Famotidine at night. She has acid reflux especially when she has red wine and red sauces. If she waits more than 3 hours to eat, she feels she has slight acid reflux. Also, if she eats to close to bedtime, she will have nocturnal reflux with regurgitation. For exercise, she walks about 8K steps daily and uses [employer]  She attended a group nutrition class last month. Patient recently had labs drawn.

## 2021-01-12 NOTE — REVIEW OF SYSTEMS
[Recent Change In Weight] : ~T recent weight change [Negative] : Heme/Lymph [Reflux/Heartburn] : reflux/heartburn [Fever] : no fever [Chills] : no chills [Dysphagia] : no dysphagia [Chest Pain] : no chest pain [Palpitations] : no palpitations [Lower Ext Edema] : no lower extremity edema [Wheezing] : no wheezing [SOB on Exertion] : no shortness of breath during exertion [Abdominal Pain] : no abdominal pain [Vomiting] : no vomiting [Constipation] : no constipation [Diarrhea] : no diarrhea [FreeTextEntry2] : weight gain

## 2021-01-12 NOTE — PHYSICAL EXAM
[Obese, well nourished, in no acute distress] : obese, well nourished, in no acute distress [Normal] : affect appropriate [de-identified] : EOMI, anicteric  [de-identified] : soft and nontender, no hepatosplenomegaly or masses appreciated.

## 2021-01-19 ENCOUNTER — APPOINTMENT (OUTPATIENT)
Dept: VASCULAR SURGERY | Facility: CLINIC | Age: 61
End: 2021-01-19
Payer: COMMERCIAL

## 2021-01-19 ENCOUNTER — NON-APPOINTMENT (OUTPATIENT)
Age: 61
End: 2021-01-19

## 2021-01-19 VITALS — BODY MASS INDEX: 44.41 KG/M2 | HEIGHT: 68 IN | WEIGHT: 293 LBS

## 2021-01-19 VITALS — TEMPERATURE: 96.8 F

## 2021-01-19 PROCEDURE — 99213 OFFICE O/P EST LOW 20 MIN: CPT

## 2021-01-19 PROCEDURE — 99072 ADDL SUPL MATRL&STAF TM PHE: CPT

## 2021-01-19 NOTE — ASSESSMENT
[FreeTextEntry1] : 59 yo female with history of venous insufficiency s/p left gsv ablation\par Has severe skin changes\par would recommend ablation with phlebectomy right leg

## 2021-01-19 NOTE — PHYSICAL EXAM
[Ankle Swelling (On Exam)] : present [Ankle Swelling On The Left] : moderate [Varicose Veins Of Lower Extremities] : present [] : bilaterally [Ankle Swelling Bilaterally] : severe [Alert] : alert [Calm] : calm [JVD] : no jugular venous distention  [de-identified] : appears well [de-identified] : shallow based ulcer over the left lateral malleolus

## 2021-01-19 NOTE — HISTORY OF PRESENT ILLNESS
[FreeTextEntry1] : 59 yo female with history of venous insufficiency s/p left gsv ablation and has been following with wound care for slow healing left lower extremity venous stasis ulcer.  pt states that the ulcer is now healed\par has severe venous stasis skin changes of the right leg with varicosities

## 2021-02-01 ENCOUNTER — APPOINTMENT (OUTPATIENT)
Dept: ELECTROPHYSIOLOGY | Facility: CLINIC | Age: 61
End: 2021-02-01
Payer: COMMERCIAL

## 2021-02-01 PROCEDURE — 93298 REM INTERROG DEV EVAL SCRMS: CPT

## 2021-02-01 PROCEDURE — G2066: CPT

## 2021-02-16 ENCOUNTER — APPOINTMENT (OUTPATIENT)
Dept: BARIATRICS/WEIGHT MGMT | Facility: CLINIC | Age: 61
End: 2021-02-16
Payer: COMMERCIAL

## 2021-02-16 NOTE — HISTORY OF PRESENT ILLNESS
[FreeTextEntry1] : 60F PMH Obesity (s/p lap band converted to sleeve 4/2019 w/hiatal hernia repair), GERD, SIMA, AFib on eliquis, PVD who presents for follow-up to weight management.\par \par Weight Today: \par Weight last visit (1/5/21): 302 lbs, BMI 45.92. 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 Discussed aiming for 12 hour fast (including coffee additives), and reducing night-time alcohol. Also discussed starting a food log, she is already keeping a log of emotions/thoughts.\par \par She started Topiramate in early September and is now taking 100 mg QHS. We discussed taking it a few hours before bed at last visit to reduce night eating, and strategies to reduce night snacking behaviors. She notes no side effects.\par \par Exercise: Walking dog. \par \par Sleep: Estimates 7 hours, wears CPAP most nights. She notes she is waking up in the middle of the night. \par She is reducing the frequency and amount of alcohol consumption. She tends to eat and drink between 9 - 11 pm when she and her  sit on the couch to watch TV in the evening. \par \par # Obesity (s/p lap band converted to sleeve w/hiatal hernia repair) c/b SIMA, AFib, PVD, and GERD: Weight today 302 lbs, initial visit weight (9/1/20) ~304 lbs). She has fluctuated between 300 and 304 lbs at each visit. She has had no side effects from Topiramate 100 mg but does not note any effect at all. \par - C/w food log\par - C/w daily activity\par - Minimize snacking and sweets now that holidays are over\par - Discussed with patient that evening alcohol may be having a multi-pronged effect against her weight loss attempts (liquid calories, SIMA, interferes with quality and depth of sleep, may be contributing to awakenings) and that she should minimize it with the medication. Patient expressed understanding with goal to minimize alcohol consumption.\par - C/w CPAP\par - Discussed night-eating habits. Recommended referring to Psychology for CBT. C/w topiramate for now. She may require alternative activity in evenings to avoid night eating/drinking, or replacing with alternatives such as herbal tea and crunchy vegetables. \par - Will discuss IF at future visit\par - Will increase Topiramate to 150 mg since she is experiencing no therapeutic or adverse effects. We discussed trying GLP-1 as an alternative, however given that she is on Omeprazole 20 mg and famotidine with some breakthrough symptoms, I would like her to have more optimal control over her GERD. We discussed that avoiding night-eating, alcohol, and trigger foods would benefit.

## 2021-02-16 NOTE — ASSESSMENT
[FreeTextEntry1] : \par \par \par \par \par \par \par \par \par \par \par Bariatric Surgery History: lap band converted to sleeve 4/2019 w/hiatal hernia repair\par \par Obesity Comorbidities: SIMA, PVD, AFib\par \par Obesity Comorbidity resolution or improvement: \par \par Anti-Obesity Medications: Topiramate\par \par Obesity Medication Side Effects: None.

## 2021-02-21 ENCOUNTER — NON-APPOINTMENT (OUTPATIENT)
Age: 61
End: 2021-02-21

## 2021-03-02 ENCOUNTER — APPOINTMENT (OUTPATIENT)
Dept: BARIATRICS/WEIGHT MGMT | Facility: CLINIC | Age: 61
End: 2021-03-02
Payer: COMMERCIAL

## 2021-03-02 VITALS
HEIGHT: 68 IN | DIASTOLIC BLOOD PRESSURE: 70 MMHG | SYSTOLIC BLOOD PRESSURE: 102 MMHG | HEART RATE: 50 BPM | WEIGHT: 293 LBS | BODY MASS INDEX: 44.41 KG/M2 | OXYGEN SATURATION: 96 %

## 2021-03-02 PROCEDURE — 99214 OFFICE O/P EST MOD 30 MIN: CPT

## 2021-03-02 PROCEDURE — 99072 ADDL SUPL MATRL&STAF TM PHE: CPT

## 2021-03-02 NOTE — HISTORY OF PRESENT ILLNESS
[FreeTextEntry1] : 60F PMH Obesity (s/p lap band converted to sleeve 4/2019 w/hiatal hernia repair), GERD, SIMA, AFib on eliquis, PVD who presents for follow-up to weight management.\par \par She was initially seen on 9/1/20 as referral from bariatric with weight regain s/p lap band w/converted sleeve. At that visit she was reporting weight of 299 lbs (subsequent clinic weights of 303 lbs). 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 She is keeping a food log, aiming for a 12-hr minimum fast, and working on avoiding night-eating and limiting alcohol. She started Topiramate in early September and has been on 150 mg QHS over the past few weeks. \par \par She has been under lots of stressors recently, as her son is very ill, she is home all the time and her  and children are always home, they are keeping foods around the house that will help her son gain weight. Further compounded by work stress at the hospital. Unhealthy foods in the house have made it more difficulty to maintain healthy eating habits. \par Food journal notable for lots of animal-sourced proteins. She does have soda around the house. \par \par  \par

## 2021-03-02 NOTE — ASSESSMENT
[FreeTextEntry1] : 60F PMH Obesity (s/p lap band converted to sleeve 4/2019 w/hiatal hernia repair), GERD, SIMA, AFib on eliquis, PVD who presents for follow-up to weight management.\par \par # Obesity (s/p lap band converted to sleeve w/hiatal hernia repair) c/b SIMA, AFib, PVD, and GERD: Weight today 305 lbs, BMI 46.5. Tolerating Topiramate 150 mg without side effect but no weight loss achieved. \par - C/w food log\par - Aim for more plant-based protein sources\par - Aim for 12 hr window and avoiding night eating\par - May discuss meal delivery options at next visit given exposure to less healthy meals with her son's condition.\par - C/w daily activity, will benefit from increasing movement\par - Continue to minimize alcohol\par - C/w CPAP\par - Will discuss IF at future visit\par - Will trial Orlistat, wean off topiramate given lack of results at this time.\par \par \par \par \par \par Bariatric Surgery History: lap band converted to sleeve 4/2019 w/hiatal hernia repair\par \par Obesity Comorbidities: SIMA, PVD, AFib\par \par Obesity Comorbidity resolution or improvement: \par \par Anti-Obesity Medications: Topiramate\par \par Obesity Medication Side Effects: None.

## 2021-03-02 NOTE — PHYSICAL EXAM
[Obese, well nourished, in no acute distress] : obese, well nourished, in no acute distress [de-identified] : TEB appointment

## 2021-03-04 ENCOUNTER — APPOINTMENT (OUTPATIENT)
Dept: ELECTROPHYSIOLOGY | Facility: CLINIC | Age: 61
End: 2021-03-04
Payer: COMMERCIAL

## 2021-03-04 VITALS — DIASTOLIC BLOOD PRESSURE: 65 MMHG | SYSTOLIC BLOOD PRESSURE: 135 MMHG

## 2021-03-04 PROCEDURE — 93285 PRGRMG DEV EVAL SCRMS IP: CPT

## 2021-03-04 PROCEDURE — 99072 ADDL SUPL MATRL&STAF TM PHE: CPT

## 2021-03-04 RX ORDER — OMEPRAZOLE 20 MG/1
20 CAPSULE, DELAYED RELEASE ORAL DAILY
Qty: 90 | Refills: 1 | Status: DISCONTINUED | COMMUNITY
Start: 2020-08-18 | End: 2021-03-04

## 2021-03-23 RX ORDER — ORLISTAT 120 MG/1
120 CAPSULE ORAL 3 TIMES DAILY
Qty: 90 | Refills: 2 | Status: DISCONTINUED | COMMUNITY
Start: 2021-03-02 | End: 2021-03-23

## 2021-04-04 ENCOUNTER — TRANSCRIPTION ENCOUNTER (OUTPATIENT)
Age: 61
End: 2021-04-04

## 2021-04-05 ENCOUNTER — TRANSCRIPTION ENCOUNTER (OUTPATIENT)
Age: 61
End: 2021-04-05

## 2021-04-07 ENCOUNTER — INPATIENT (INPATIENT)
Facility: HOSPITAL | Age: 61
LOS: 8 days | Discharge: HOME | End: 2021-04-16
Attending: INTERNAL MEDICINE | Admitting: INTERNAL MEDICINE
Payer: COMMERCIAL

## 2021-04-07 ENCOUNTER — APPOINTMENT (OUTPATIENT)
Dept: WOUND CARE | Facility: CLINIC | Age: 61
End: 2021-04-07
Payer: COMMERCIAL

## 2021-04-07 VITALS
TEMPERATURE: 99 F | SYSTOLIC BLOOD PRESSURE: 97 MMHG | HEART RATE: 82 BPM | HEIGHT: 68 IN | RESPIRATION RATE: 16 BRPM | OXYGEN SATURATION: 100 % | DIASTOLIC BLOOD PRESSURE: 75 MMHG

## 2021-04-07 VITALS
HEART RATE: 76 BPM | RESPIRATION RATE: 16 BRPM | TEMPERATURE: 98.4 F | DIASTOLIC BLOOD PRESSURE: 67 MMHG | SYSTOLIC BLOOD PRESSURE: 109 MMHG

## 2021-04-07 DIAGNOSIS — Z29.9 ENCOUNTER FOR PROPHYLACTIC MEASURES, UNSPECIFIED: ICD-10-CM

## 2021-04-07 DIAGNOSIS — I73.9 PERIPHERAL VASCULAR DISEASE, UNSPECIFIED: ICD-10-CM

## 2021-04-07 DIAGNOSIS — K21.9 GASTRO-ESOPHAGEAL REFLUX DISEASE WITHOUT ESOPHAGITIS: ICD-10-CM

## 2021-04-07 DIAGNOSIS — L97.929 NON-PRESSURE CHRONIC ULCER OF UNSPECIFIED PART OF LEFT LOWER LEG WITH UNSPECIFIED SEVERITY: ICD-10-CM

## 2021-04-07 DIAGNOSIS — Z95.818 PRESENCE OF OTHER CARDIAC IMPLANTS AND GRAFTS: Chronic | ICD-10-CM

## 2021-04-07 DIAGNOSIS — Z98.84 BARIATRIC SURGERY STATUS: Chronic | ICD-10-CM

## 2021-04-07 DIAGNOSIS — I48.0 PAROXYSMAL ATRIAL FIBRILLATION: ICD-10-CM

## 2021-04-07 DIAGNOSIS — L03.116 CELLULITIS OF LEFT LOWER LIMB: ICD-10-CM

## 2021-04-07 LAB
A1C WITH ESTIMATED AVERAGE GLUCOSE RESULT: 5.4 % — SIGNIFICANT CHANGE UP (ref 4–5.6)
ALBUMIN SERPL ELPH-MCNC: 4.1 G/DL — SIGNIFICANT CHANGE UP (ref 3.3–5)
ALP SERPL-CCNC: 130 U/L — HIGH (ref 40–120)
ALT FLD-CCNC: 44 U/L — HIGH (ref 4–33)
ANION GAP SERPL CALC-SCNC: 15 MMOL/L — HIGH (ref 7–14)
APTT BLD: 36.7 SEC — HIGH (ref 27–36.3)
AST SERPL-CCNC: 42 U/L — HIGH (ref 4–32)
BASOPHILS # BLD AUTO: 0.03 K/UL — SIGNIFICANT CHANGE UP (ref 0–0.2)
BASOPHILS NFR BLD AUTO: 0.2 % — SIGNIFICANT CHANGE UP (ref 0–2)
BILIRUB SERPL-MCNC: 0.7 MG/DL — SIGNIFICANT CHANGE UP (ref 0.2–1.2)
BLD GP AB SCN SERPL QL: NEGATIVE — SIGNIFICANT CHANGE UP
BLOOD GAS VENOUS COMPREHENSIVE RESULT: SIGNIFICANT CHANGE UP
BUN SERPL-MCNC: 10 MG/DL — SIGNIFICANT CHANGE UP (ref 7–23)
CALCIUM SERPL-MCNC: 9.4 MG/DL — SIGNIFICANT CHANGE UP (ref 8.4–10.5)
CHLORIDE SERPL-SCNC: 98 MMOL/L — SIGNIFICANT CHANGE UP (ref 98–107)
CO2 SERPL-SCNC: 22 MMOL/L — SIGNIFICANT CHANGE UP (ref 22–31)
CREAT SERPL-MCNC: 0.71 MG/DL — SIGNIFICANT CHANGE UP (ref 0.5–1.3)
EOSINOPHIL # BLD AUTO: 0.1 K/UL — SIGNIFICANT CHANGE UP (ref 0–0.5)
EOSINOPHIL NFR BLD AUTO: 0.8 % — SIGNIFICANT CHANGE UP (ref 0–6)
ESTIMATED AVERAGE GLUCOSE: 108 MG/DL — SIGNIFICANT CHANGE UP (ref 68–114)
GLUCOSE SERPL-MCNC: 95 MG/DL — SIGNIFICANT CHANGE UP (ref 70–99)
HCT VFR BLD CALC: 39.9 % — SIGNIFICANT CHANGE UP (ref 34.5–45)
HGB BLD-MCNC: 13 G/DL — SIGNIFICANT CHANGE UP (ref 11.5–15.5)
IANC: 9.63 K/UL — HIGH (ref 1.5–8.5)
IMM GRANULOCYTES NFR BLD AUTO: 0.5 % — SIGNIFICANT CHANGE UP (ref 0–1.5)
INR BLD: 1.6 RATIO — HIGH (ref 0.88–1.16)
LYMPHOCYTES # BLD AUTO: 1.36 K/UL — SIGNIFICANT CHANGE UP (ref 1–3.3)
LYMPHOCYTES # BLD AUTO: 11.1 % — LOW (ref 13–44)
MAGNESIUM SERPL-MCNC: 2 MG/DL — SIGNIFICANT CHANGE UP (ref 1.6–2.6)
MCHC RBC-ENTMCNC: 29.1 PG — SIGNIFICANT CHANGE UP (ref 27–34)
MCHC RBC-ENTMCNC: 32.6 GM/DL — SIGNIFICANT CHANGE UP (ref 32–36)
MCV RBC AUTO: 89.5 FL — SIGNIFICANT CHANGE UP (ref 80–100)
MONOCYTES # BLD AUTO: 1.11 K/UL — HIGH (ref 0–0.9)
MONOCYTES NFR BLD AUTO: 9 % — SIGNIFICANT CHANGE UP (ref 2–14)
NEUTROPHILS # BLD AUTO: 9.63 K/UL — HIGH (ref 1.8–7.4)
NEUTROPHILS NFR BLD AUTO: 78.4 % — HIGH (ref 43–77)
NRBC # BLD: 0 /100 WBCS — SIGNIFICANT CHANGE UP
NRBC # FLD: 0 K/UL — SIGNIFICANT CHANGE UP
PHOSPHATE SERPL-MCNC: 2.9 MG/DL — SIGNIFICANT CHANGE UP (ref 2.5–4.5)
PLATELET # BLD AUTO: 274 K/UL — SIGNIFICANT CHANGE UP (ref 150–400)
POTASSIUM SERPL-MCNC: 3.2 MMOL/L — LOW (ref 3.5–5.3)
POTASSIUM SERPL-SCNC: 3.2 MMOL/L — LOW (ref 3.5–5.3)
PROT SERPL-MCNC: 8 G/DL — SIGNIFICANT CHANGE UP (ref 6–8.3)
PROTHROM AB SERPL-ACNC: 17.8 SEC — HIGH (ref 10.6–13.6)
RBC # BLD: 4.46 M/UL — SIGNIFICANT CHANGE UP (ref 3.8–5.2)
RBC # FLD: 13.1 % — SIGNIFICANT CHANGE UP (ref 10.3–14.5)
RH IG SCN BLD-IMP: NEGATIVE — SIGNIFICANT CHANGE UP
SARS-COV-2 RNA SPEC QL NAA+PROBE: SIGNIFICANT CHANGE UP
SODIUM SERPL-SCNC: 135 MMOL/L — SIGNIFICANT CHANGE UP (ref 135–145)
WBC # BLD: 12.29 K/UL — HIGH (ref 3.8–10.5)
WBC # FLD AUTO: 12.29 K/UL — HIGH (ref 3.8–10.5)

## 2021-04-07 PROCEDURE — 99213 OFFICE O/P EST LOW 20 MIN: CPT

## 2021-04-07 PROCEDURE — 93971 EXTREMITY STUDY: CPT | Mod: 26,LT

## 2021-04-07 PROCEDURE — 99285 EMERGENCY DEPT VISIT HI MDM: CPT

## 2021-04-07 PROCEDURE — 99072 ADDL SUPL MATRL&STAF TM PHE: CPT

## 2021-04-07 PROCEDURE — 73620 X-RAY EXAM OF FOOT: CPT | Mod: 26,LT

## 2021-04-07 PROCEDURE — 73610 X-RAY EXAM OF ANKLE: CPT | Mod: 26,LT

## 2021-04-07 PROCEDURE — 99223 1ST HOSP IP/OBS HIGH 75: CPT

## 2021-04-07 PROCEDURE — 73590 X-RAY EXAM OF LOWER LEG: CPT | Mod: 26,LT

## 2021-04-07 RX ORDER — APIXABAN 2.5 MG/1
5 TABLET, FILM COATED ORAL
Refills: 0 | Status: DISCONTINUED | OUTPATIENT
Start: 2021-04-07 | End: 2021-04-16

## 2021-04-07 RX ORDER — ONDANSETRON 8 MG/1
1 TABLET, FILM COATED ORAL
Qty: 0 | Refills: 0 | DISCHARGE

## 2021-04-07 RX ORDER — SODIUM CHLORIDE 9 MG/ML
1000 INJECTION INTRAMUSCULAR; INTRAVENOUS; SUBCUTANEOUS ONCE
Refills: 0 | Status: COMPLETED | OUTPATIENT
Start: 2021-04-07 | End: 2021-04-07

## 2021-04-07 RX ORDER — METOPROLOL TARTRATE 50 MG
12.5 TABLET ORAL
Refills: 0 | Status: DISCONTINUED | OUTPATIENT
Start: 2021-04-07 | End: 2021-04-16

## 2021-04-07 RX ORDER — ENOXAPARIN SODIUM 100 MG/ML
40 INJECTION SUBCUTANEOUS
Refills: 0 | Status: DISCONTINUED | OUTPATIENT
Start: 2021-04-07 | End: 2021-04-07

## 2021-04-07 RX ORDER — PANTOPRAZOLE SODIUM 20 MG/1
40 TABLET, DELAYED RELEASE ORAL
Refills: 0 | Status: DISCONTINUED | OUTPATIENT
Start: 2021-04-07 | End: 2021-04-16

## 2021-04-07 RX ORDER — POTASSIUM CHLORIDE 20 MEQ
40 PACKET (EA) ORAL ONCE
Refills: 0 | Status: COMPLETED | OUTPATIENT
Start: 2021-04-07 | End: 2021-04-07

## 2021-04-07 RX ORDER — POTASSIUM CHLORIDE 20 MEQ
10 PACKET (EA) ORAL
Refills: 0 | Status: COMPLETED | OUTPATIENT
Start: 2021-04-07 | End: 2021-04-07

## 2021-04-07 RX ORDER — FAMOTIDINE 10 MG/ML
20 INJECTION INTRAVENOUS DAILY
Refills: 0 | Status: DISCONTINUED | OUTPATIENT
Start: 2021-04-07 | End: 2021-04-16

## 2021-04-07 RX ORDER — VANCOMYCIN HCL 1 G
1500 VIAL (EA) INTRAVENOUS EVERY 12 HOURS
Refills: 0 | Status: DISCONTINUED | OUTPATIENT
Start: 2021-04-08 | End: 2021-04-12

## 2021-04-07 RX ORDER — OMEPRAZOLE 10 MG/1
1 CAPSULE, DELAYED RELEASE ORAL
Qty: 0 | Refills: 0 | DISCHARGE

## 2021-04-07 RX ORDER — VANCOMYCIN HCL 1 G
1000 VIAL (EA) INTRAVENOUS ONCE
Refills: 0 | Status: COMPLETED | OUTPATIENT
Start: 2021-04-07 | End: 2021-04-07

## 2021-04-07 RX ORDER — TOPIRAMATE 50 MG/1
50 TABLET, FILM COATED ORAL DAILY
Qty: 270 | Refills: 0 | Status: DISCONTINUED | COMMUNITY
Start: 2020-09-09 | End: 2021-04-07

## 2021-04-07 RX ORDER — MORPHINE SULFATE 50 MG/1
4 CAPSULE, EXTENDED RELEASE ORAL ONCE
Refills: 0 | Status: DISCONTINUED | OUTPATIENT
Start: 2021-04-07 | End: 2021-04-07

## 2021-04-07 RX ORDER — MORPHINE SULFATE 50 MG/1
2 CAPSULE, EXTENDED RELEASE ORAL ONCE
Refills: 0 | Status: DISCONTINUED | OUTPATIENT
Start: 2021-04-07 | End: 2021-04-07

## 2021-04-07 RX ADMIN — Medication 100 MILLIEQUIVALENT(S): at 19:11

## 2021-04-07 RX ADMIN — FAMOTIDINE 20 MILLIGRAM(S): 10 INJECTION INTRAVENOUS at 21:43

## 2021-04-07 RX ADMIN — Medication 250 MILLIGRAM(S): at 14:37

## 2021-04-07 RX ADMIN — Medication 100 MILLIEQUIVALENT(S): at 21:45

## 2021-04-07 RX ADMIN — MORPHINE SULFATE 4 MILLIGRAM(S): 50 CAPSULE, EXTENDED RELEASE ORAL at 18:16

## 2021-04-07 RX ADMIN — MORPHINE SULFATE 2 MILLIGRAM(S): 50 CAPSULE, EXTENDED RELEASE ORAL at 23:35

## 2021-04-07 RX ADMIN — SODIUM CHLORIDE 1000 MILLILITER(S): 9 INJECTION INTRAMUSCULAR; INTRAVENOUS; SUBCUTANEOUS at 14:26

## 2021-04-07 RX ADMIN — Medication 12.5 MILLIGRAM(S): at 21:43

## 2021-04-07 RX ADMIN — Medication 100 MILLIEQUIVALENT(S): at 17:48

## 2021-04-07 RX ADMIN — Medication 40 MILLIEQUIVALENT(S): at 16:02

## 2021-04-07 RX ADMIN — MORPHINE SULFATE 4 MILLIGRAM(S): 50 CAPSULE, EXTENDED RELEASE ORAL at 17:22

## 2021-04-07 RX ADMIN — MORPHINE SULFATE 4 MILLIGRAM(S): 50 CAPSULE, EXTENDED RELEASE ORAL at 14:26

## 2021-04-07 RX ADMIN — APIXABAN 5 MILLIGRAM(S): 2.5 TABLET, FILM COATED ORAL at 21:43

## 2021-04-07 NOTE — H&P ADULT - NSHPREVIEWOFSYSTEMS_GEN_ALL_CORE
REVIEW OF SYSTEMS:    CONSTITUTIONAL: No weakness, +fevers and chills  EYES/ENT: No visual changes;  No vertigo or throat pain   NECK: No pain or stiffness  RESPIRATORY: No cough, wheezing, hemoptysis; No shortness of breath  CARDIOVASCULAR: No chest pain or palpitations  GASTROINTESTINAL: No abdominal or epigastric pain. No nausea, vomiting, or hematemesis; +diarrhea, no constipation. No melena or hematochezia.  GENITOURINARY: No dysuria, frequency or hematuria  NEUROLOGICAL: No numbness or weakness  SKIN: No itching, rashes

## 2021-04-07 NOTE — H&P ADULT - NSHPPHYSICALEXAM_GEN_ALL_CORE
VITALS:   T(C): 37.1 (04-07-21 @ 17:31), Max: 37.3 (04-07-21 @ 15:41)  HR: 74 (04-07-21 @ 17:31) (74 - 83)  BP: 114/67 (04-07-21 @ 17:31) (97/75 - 122/57)  RR: 16 (04-07-21 @ 17:31) (16 - 16)  SpO2: 100% (04-07-21 @ 17:31) (100% - 100%)    GENERAL: NAD, lying in bed comfortably  HEAD:  Normocephalic  EYES: Sclera clear  ENT: Moist mucous membranes  NECK: Supple, No JVD  CHEST/LUNG: Clear to auscultation bilaterally; No rales, rhonchi, wheezing, or rubs. Unlabored respirations  HEART: Regular rate and rhythm; No murmurs, rubs, or gallops  ABDOMEN: BSx4; Soft, nontender, nondistended  EXTREMITIES:  2+ Peripheral Pulses, brisk capillary refill. No clubbing, cyanosis, or edema. +B/L varicose veins  NERVOUS SYSTEM:  A&Ox3, no focal deficits   SKIN: +warm, erythematous area extending from knee to ankle

## 2021-04-07 NOTE — ED PROVIDER NOTE - ATTENDING CONTRIBUTION TO CARE
Attending note:   After face to face evaluation of this patient, I concur with above noted hx, pe, and care plan for this patient.  Campbell: 60 yof with afib on AC, vascular disease complaining of fever and leg leg swelling and pain. Pt denies any trauma. Pt seen by doc on [facility] and started antibiotics and redness marked on leg but continues to spread and is not around to back of leg. Pt is well appearing, in pain, denies any CP or SOB. Clear lungs, RRR, abd soft and non tender, LE exam shows bilateral edematous legs with worse on left with circumferential erythema, edema, pitting skin from distal knee to ankle,warm to touch, painful to touch and move leg. pulses present. Pt with worsening cellulitis on oral antibiotics, failed outpatient, will need IV and admission as this is a significant infection with concern for sepsis given fevers and spreading. Will monitor closely, IV antibxs, labs cultures and admit.

## 2021-04-07 NOTE — ED ADULT NURSE NOTE - COVID-19 ORDERING FACILITY
[facility]  [facility]

## 2021-04-07 NOTE — H&P ADULT - ASSESSMENT
61yo F w/ h/o paroxysmal Afib (on Eliquis), PAD, gastric sleeve p/w cellulitis after failing outpatient PO therapy.

## 2021-04-07 NOTE — ED ADULT NURSE REASSESSMENT NOTE - NS ED NURSE REASSESS COMMENT FT1
Patient keeping her legs elevated. K runs infusing well, repot given to CDU, and patient transported in stable condition.

## 2021-04-07 NOTE — H&P ADULT - HISTORY OF PRESENT ILLNESS
61yo F w/ h/o paroxysmal Afib (on Eliquis), PAD, gastric sleeve p/w cellulitis after failing outpatient PO therapy. Per pt had intermittent fevers for 5d prior to admission, w/ Tmax 104. Two days after onset of fever she also noted worsening L leg swelling a/w erythema and warmth. Reports some relief w/ Tylenol. Pt had a telehealth appointment w/ her PMD who prescribed clindamycin (pt completed 2d prior to admission), however per pt her symptoms continued to worsen.  61yo F w/ h/o paroxysmal Afib (on Eliquis), PAD, gastric sleeve p/w cellulitis after failing outpatient PO therapy. Per pt had intermittent fevers for 5d prior to admission, w/ Tmax 104. Two days after onset of fever she also noted worsening L leg swelling a/w erythema, pain, and warmth. Reports some relief w/ Tylenol. Pt had a telehealth appointment during which time her docotr prescribed clindamycin (pt completed 2d prior to admission) and recommended that the patient demarcate the erythema. Per pt the erythema continued to progress past the demarcation lines and she continued to have fevers so she decided to come to the ED. She endorses some associated nonbloody diarrhea beginning at the same time as fever onset and decreased PO intake. Pt denies CP, SOB/HERNANDEZ, N/V/C, dysuria/frequency/urgency. Recently quarantined x2wk due to COVID exposure (children and ); pt is fully vaccinated against COVID. Denies any recent travel.    IN THE ED: VSS (Afebrile, HR 80s, /70, RR 16 @ 100% on RA). WBC = 12.29. K+ = 3.2. Lactate = 1.2. Xray Foot/Ankle/Tibia: +soft tissue swelling. US Duplex: negative for DVT. Received morphine 4 x1, potassium repletion vancomycin x1, and NS 1L bolus.

## 2021-04-07 NOTE — ED PROVIDER NOTE - OBJECTIVE STATEMENT
The patient is a 60y Female complaining of fever. The patient is a 60y Female with pmhx of paroxysmal afib and vascular disease complaining of fever and left leg swelling/redness. Fever abruptly started 5 days ago at night when pt was about to sleep. Since then, pt has had on and off fevers with Tmax 104 F. Last took Tylenol yesterday, which helped a little bit. 3 days ago, pt started noticing her LLE become painful, red, warm in the anterior shin area. Her daughter used marker to demarcate the size. Had tele-health appointment 2 days ago, doctor prescribed PO clindamycin, which pt has been taking, but symptoms have worsened. Saw wound care today for a previous vascular surgery today, who told her to go to ED immediately for worsening cellulitis. Denies any CP or SOB. Able to ambulate, but it is painful to put pressure on LLE. Pt says that area of swelling is painful to touch. Says she was told she has PCN allergy, but does not remember getting a rash or anaphylactic reaction to PCN in the past. The patient is a 60y Female with pmhx of paroxysmal afib (on eliquis and metoprolol) and vascular disease complaining of fever and left leg swelling/redness. Fever abruptly started 5 days ago at night when pt was about to sleep. Since then, pt has had on and off fevers with Tmax 104 F. Last took Tylenol yesterday, which helped a little bit. 3 days ago, pt started noticing her LLE become painful, red, warm in the anterior shin area. Her daughter used marker to demarcate the size. Had tele-health appointment 2 days ago, doctor prescribed PO clindamycin, which pt has been taking, but symptoms have worsened. Saw wound care today for a previous vascular surgery today, who told her to go to ED immediately for worsening cellulitis. Denies any CP or SOB. Able to ambulate, but it is painful to put pressure on LLE. Pt says that area of swelling is painful to touch. Says she was told she has PCN allergy, but does not remember getting a rash or anaphylactic reaction to PCN in the past. The patient is a 60y Female with pmhx of paroxysmal afib (on eliquis and metoprolol) and vascular disease complaining of fever and left leg rash/swelling/redness. Fever abruptly started 5 days ago at night when pt was about to sleep. Since then, pt has had on and off fevers with Tmax 104 F. Last took Tylenol yesterday, which helped a little bit. 3 days ago, pt started noticing her LLE become painful, red, warm in the anterior shin area. Her daughter used marker to demarcate the size. Had tele-health appointment 2 days ago, doctor prescribed PO clindamycin, which pt has been taking, but symptoms have worsened. Saw wound care today for a previous vascular surgery today, who told her to go to ED immediately for worsening cellulitis. Denies any CP or SOB. Able to ambulate, but it is painful to put pressure on LLE. Pt says that area of swelling is painful to touch. Says she was told she has PCN allergy, but does not remember getting a rash or anaphylactic reaction to PCN in the past.

## 2021-04-07 NOTE — H&P ADULT - PROBLEM SELECTOR PLAN 1
Pt w/ cellulitis to LL extremity, s/p failed trial of clindamycin, admitted for IV abx.  - C/w vancomycin - Will consider narrowing pending culture results  - F/u BCx  - F/u MRSA swab

## 2021-04-07 NOTE — ED PROVIDER NOTE - PROGRESS NOTE DETAILS
Elijah Lisa MD (PGY-1): Doppler US and x-rays of LLE are negative for DVT and fracture/osteomyelitis. Pt will be admitted to Medicine for IV abx for LLE cellulitis.

## 2021-04-07 NOTE — H&P ADULT - NSHPSOCIALHISTORY_GEN_ALL_CORE
Lives at home w/  and 2 children. Denies tobacco/alcohol/ilicit drug use. Currently employed as a PACU nurse.

## 2021-04-07 NOTE — ED PROVIDER NOTE - NS ED ROS FT
GENERAL: +fever and chills  EYES: No change in vision  HEENT: No trouble swallowing or speaking  CARDIAC: No chest pain  PULMONARY: No cough or SOB  GI: No abdominal pain, no nausea or no vomiting, no diarrhea or constipation  : No changes in urination  SKIN: +lower extremity rash/swelling  NEURO: No headache, no numbness  MSK: No joint pain  Otherwise as HPI or negative.

## 2021-04-07 NOTE — ED ADULT NURSE NOTE - OBJECTIVE STATEMENT
break coverage RN- received Pt in intake room 7. pt A&OX3. Pt c/o fever and left lower leg swelling/redness x a few days. Pt with redness/ warmth/ swelling and pain to the left lower leg. pt states started on clindamycin x 3 days, reports cannot control fever. pt appears stable and in no distress at this time. respirations are equal and nonlabored, no respiratory distress noted. pt denies any other complaints at this time. 20 gauge iv placed in the left ac, sepsis labs sent. pt medicated as per orders. iv fluids infusing. awaiting further plan

## 2021-04-07 NOTE — ED PROVIDER NOTE - CLINICAL SUMMARY MEDICAL DECISION MAKING FREE TEXT BOX
Elijah Lisa MD (PGY-1): The patient is a 60y Female with pmhx of paroxysmal afib (on eliquis and metoprolol) and vascular disease complaining of fever and left leg rash/swelling/redness. Most likely cellulitis. DDx includes DVT and osteomyelitis. IVF, morphine, labs, IV vancomycin, covid swab, u/a, x-rays LLE, Doppler US LLE. Pt will be admitted to Medicine.

## 2021-04-07 NOTE — H&P ADULT - NSICDXPASTMEDICALHX_GEN_ALL_CORE_FT
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

## 2021-04-07 NOTE — H&P ADULT - NSHPLABSRESULTS_GEN_ALL_CORE
LABS:                          13.0   12.29 )-----------( 274      ( 07 Apr 2021 14:30 )             39.9     04-07    135  |  98  |  10  ----------------------------<  95  3.2<L>   |  22  |  0.71    Ca    9.4      07 Apr 2021 14:30  Phos  2.9     04-07  Mg     2.0     04-07    TPro  8.0  /  Alb  4.1  /  TBili  0.7  /  DBili  x   /  AST  42<H>  /  ALT  44<H>  /  AlkPhos  130<H>  04-07    LIVER FUNCTIONS - ( 07 Apr 2021 14:30 )  Alb: 4.1 g/dL / Pro: 8.0 g/dL / ALK PHOS: 130 U/L / ALT: 44 U/L / AST: 42 U/L / GGT: x           x< from: Xray Ankle Complete 3 Views, Left (04.07.21 @ 15:29) >    IMPRESSION:  Diffuse soft tissue swelling with granular dystrophic calcifications predominantly in lowercalf subcutaneous tissues.    No tracking soft tissue gas collections, radiopaque foreign bodies, or gross radiographic evidence for osteomyelitis.    No fractures, dislocations, or osseous or joint deformities.    Mild knee multicompartment osteoarthritis. Preserved ankle and visualized midfoot and hindfoot joint spaces and no joint margin erosions.    Small calcaneal enthesophytes.    No discrete lytic or blastic lesions.    < end of copied text >    < from: US Duplex Venous Lower Ext Ltd, Left (04.07.21 @ 16:30) >    IMPRESSION:  No evidence of left lower extremity deep venous thrombosis.    < end of copied text >      PT/INR - ( 07 Apr 2021 14:30 )   PT: 17.8 sec;   INR: 1.60 ratio         PTT - ( 07 Apr 2021 14:30 )  PTT:36.7 sec

## 2021-04-07 NOTE — ED ADULT TRIAGE NOTE - CHIEF COMPLAINT QUOTE
states " I am having fever since few days with cellulitis to my left lower leg". COVID negative twice in few days. redness to left lower leg noted. started on clindamycin since Monday patient states she can not control  her fever .

## 2021-04-07 NOTE — ED PROVIDER NOTE - WR ORDER ID 1
PCP: Dr Bryan  Cardiology: [facility]  Vascular: Dr Jared Sorenson  Renal: Dr Avendano [id number]

## 2021-04-07 NOTE — ED ADULT NURSE REASSESSMENT NOTE - NS ED NURSE REASSESS COMMENT FT1
patient aaox3. ambulatory. came in with fevers at home. patient also has left lower extremity swelling, reddness, dark discoloration, and white spots. skin intact. tender to palpation. pain 8/10 described as burning feeling. took tylenol and motrin at home for fevers of tmax 104. afebrile here. patient receiving kcl 40 for low potassium to left ac #20g. appears comfortable. Will continue to monitor

## 2021-04-08 PROBLEM — L97.929 LEG ULCER, LEFT: Status: ACTIVE | Noted: 2020-10-27

## 2021-04-08 LAB
ALBUMIN SERPL ELPH-MCNC: 3.8 G/DL — SIGNIFICANT CHANGE UP (ref 3.3–5)
ALP SERPL-CCNC: 109 U/L — SIGNIFICANT CHANGE UP (ref 40–120)
ALT FLD-CCNC: 35 U/L — HIGH (ref 4–33)
ANION GAP SERPL CALC-SCNC: 12 MMOL/L — SIGNIFICANT CHANGE UP (ref 7–14)
AST SERPL-CCNC: 29 U/L — SIGNIFICANT CHANGE UP (ref 4–32)
BASOPHILS # BLD AUTO: 0.03 K/UL — SIGNIFICANT CHANGE UP (ref 0–0.2)
BASOPHILS NFR BLD AUTO: 0.3 % — SIGNIFICANT CHANGE UP (ref 0–2)
BILIRUB SERPL-MCNC: 0.6 MG/DL — SIGNIFICANT CHANGE UP (ref 0.2–1.2)
BUN SERPL-MCNC: 9 MG/DL — SIGNIFICANT CHANGE UP (ref 7–23)
CALCIUM SERPL-MCNC: 9.1 MG/DL — SIGNIFICANT CHANGE UP (ref 8.4–10.5)
CHLORIDE SERPL-SCNC: 101 MMOL/L — SIGNIFICANT CHANGE UP (ref 98–107)
CO2 SERPL-SCNC: 23 MMOL/L — SIGNIFICANT CHANGE UP (ref 22–31)
COVID-19 SPIKE DOMAIN AB INTERP: POSITIVE
COVID-19 SPIKE DOMAIN ANTIBODY RESULT: >250 U/ML — HIGH
CREAT SERPL-MCNC: 0.67 MG/DL — SIGNIFICANT CHANGE UP (ref 0.5–1.3)
EOSINOPHIL # BLD AUTO: 0.21 K/UL — SIGNIFICANT CHANGE UP (ref 0–0.5)
EOSINOPHIL NFR BLD AUTO: 2 % — SIGNIFICANT CHANGE UP (ref 0–6)
GLUCOSE SERPL-MCNC: 94 MG/DL — SIGNIFICANT CHANGE UP (ref 70–99)
HCT VFR BLD CALC: 36.1 % — SIGNIFICANT CHANGE UP (ref 34.5–45)
HCV AB S/CO SERPL IA: 0.18 S/CO — SIGNIFICANT CHANGE UP (ref 0–0.99)
HCV AB SERPL-IMP: SIGNIFICANT CHANGE UP
HGB BLD-MCNC: 11.8 G/DL — SIGNIFICANT CHANGE UP (ref 11.5–15.5)
IANC: 6.96 K/UL — SIGNIFICANT CHANGE UP (ref 1.5–8.5)
IMM GRANULOCYTES NFR BLD AUTO: 0.6 % — SIGNIFICANT CHANGE UP (ref 0–1.5)
LYMPHOCYTES # BLD AUTO: 2.12 K/UL — SIGNIFICANT CHANGE UP (ref 1–3.3)
LYMPHOCYTES # BLD AUTO: 20.5 % — SIGNIFICANT CHANGE UP (ref 13–44)
MAGNESIUM SERPL-MCNC: 2.1 MG/DL — SIGNIFICANT CHANGE UP (ref 1.6–2.6)
MCHC RBC-ENTMCNC: 29.1 PG — SIGNIFICANT CHANGE UP (ref 27–34)
MCHC RBC-ENTMCNC: 32.7 GM/DL — SIGNIFICANT CHANGE UP (ref 32–36)
MCV RBC AUTO: 88.9 FL — SIGNIFICANT CHANGE UP (ref 80–100)
MONOCYTES # BLD AUTO: 0.98 K/UL — HIGH (ref 0–0.9)
MONOCYTES NFR BLD AUTO: 9.5 % — SIGNIFICANT CHANGE UP (ref 2–14)
NEUTROPHILS # BLD AUTO: 6.96 K/UL — SIGNIFICANT CHANGE UP (ref 1.8–7.4)
NEUTROPHILS NFR BLD AUTO: 67.1 % — SIGNIFICANT CHANGE UP (ref 43–77)
NRBC # BLD: 0 /100 WBCS — SIGNIFICANT CHANGE UP
NRBC # FLD: 0 K/UL — SIGNIFICANT CHANGE UP
PHOSPHATE SERPL-MCNC: 3.3 MG/DL — SIGNIFICANT CHANGE UP (ref 2.5–4.5)
PLATELET # BLD AUTO: 283 K/UL — SIGNIFICANT CHANGE UP (ref 150–400)
POTASSIUM SERPL-MCNC: 3.8 MMOL/L — SIGNIFICANT CHANGE UP (ref 3.5–5.3)
POTASSIUM SERPL-SCNC: 3.8 MMOL/L — SIGNIFICANT CHANGE UP (ref 3.5–5.3)
PROT SERPL-MCNC: 7.2 G/DL — SIGNIFICANT CHANGE UP (ref 6–8.3)
RBC # BLD: 4.06 M/UL — SIGNIFICANT CHANGE UP (ref 3.8–5.2)
RBC # FLD: 13.1 % — SIGNIFICANT CHANGE UP (ref 10.3–14.5)
SARS-COV-2 IGG+IGM SERPL QL IA: >250 U/ML — HIGH
SARS-COV-2 IGG+IGM SERPL QL IA: POSITIVE
SODIUM SERPL-SCNC: 136 MMOL/L — SIGNIFICANT CHANGE UP (ref 135–145)
WBC # BLD: 10.36 K/UL — SIGNIFICANT CHANGE UP (ref 3.8–10.5)
WBC # FLD AUTO: 10.36 K/UL — SIGNIFICANT CHANGE UP (ref 3.8–10.5)

## 2021-04-08 PROCEDURE — 99233 SBSQ HOSP IP/OBS HIGH 50: CPT

## 2021-04-08 RX ORDER — ACETAMINOPHEN 500 MG
1000 TABLET ORAL ONCE
Refills: 0 | Status: COMPLETED | OUTPATIENT
Start: 2021-04-08 | End: 2021-04-09

## 2021-04-08 RX ORDER — ACETAMINOPHEN 500 MG
650 TABLET ORAL EVERY 6 HOURS
Refills: 0 | Status: DISCONTINUED | OUTPATIENT
Start: 2021-04-08 | End: 2021-04-08

## 2021-04-08 RX ORDER — ACETAMINOPHEN 500 MG
1000 TABLET ORAL ONCE
Refills: 0 | Status: COMPLETED | OUTPATIENT
Start: 2021-04-08 | End: 2021-04-08

## 2021-04-08 RX ORDER — OXYCODONE HYDROCHLORIDE 5 MG/1
5 TABLET ORAL EVERY 6 HOURS
Refills: 0 | Status: DISCONTINUED | OUTPATIENT
Start: 2021-04-08 | End: 2021-04-09

## 2021-04-08 RX ORDER — KETOROLAC TROMETHAMINE 30 MG/ML
30 SYRINGE (ML) INJECTION ONCE
Refills: 0 | Status: DISCONTINUED | OUTPATIENT
Start: 2021-04-08 | End: 2021-04-08

## 2021-04-08 RX ADMIN — Medication 400 MILLIGRAM(S): at 02:53

## 2021-04-08 RX ADMIN — PANTOPRAZOLE SODIUM 40 MILLIGRAM(S): 20 TABLET, DELAYED RELEASE ORAL at 06:01

## 2021-04-08 RX ADMIN — Medication 1000 MILLIGRAM(S): at 03:01

## 2021-04-08 RX ADMIN — OXYCODONE HYDROCHLORIDE 5 MILLIGRAM(S): 5 TABLET ORAL at 11:46

## 2021-04-08 RX ADMIN — APIXABAN 5 MILLIGRAM(S): 2.5 TABLET, FILM COATED ORAL at 17:02

## 2021-04-08 RX ADMIN — APIXABAN 5 MILLIGRAM(S): 2.5 TABLET, FILM COATED ORAL at 06:01

## 2021-04-08 RX ADMIN — OXYCODONE HYDROCHLORIDE 5 MILLIGRAM(S): 5 TABLET ORAL at 12:38

## 2021-04-08 RX ADMIN — Medication 12.5 MILLIGRAM(S): at 06:01

## 2021-04-08 RX ADMIN — FAMOTIDINE 20 MILLIGRAM(S): 10 INJECTION INTRAVENOUS at 17:02

## 2021-04-08 RX ADMIN — Medication 300 MILLIGRAM(S): at 02:21

## 2021-04-08 RX ADMIN — Medication 400 MILLIGRAM(S): at 17:02

## 2021-04-08 RX ADMIN — Medication 1000 MILLIGRAM(S): at 17:32

## 2021-04-08 RX ADMIN — Medication 300 MILLIGRAM(S): at 14:21

## 2021-04-08 RX ADMIN — MORPHINE SULFATE 2 MILLIGRAM(S): 50 CAPSULE, EXTENDED RELEASE ORAL at 00:00

## 2021-04-08 RX ADMIN — Medication 30 MILLIGRAM(S): at 22:25

## 2021-04-08 RX ADMIN — Medication 12.5 MILLIGRAM(S): at 17:02

## 2021-04-08 RX ADMIN — Medication 30 MILLIGRAM(S): at 22:05

## 2021-04-08 NOTE — PROGRESS NOTE ADULT - PROBLEM SELECTOR PLAN 1
Pt w/ cellulitis to LL extremity, s/p failed trial of clindamycin, admitted for IV abx.  - C/w vancomycin - Will consider narrowing pending culture results  - F/u BCx  - F/u MRSA swab  - C/w Tylenol PRN for mild/moderate pain; oxycodone 5mg PO for severe pain

## 2021-04-08 NOTE — PHYSICAL EXAM
[Please See PDF for [employer]] : Please See PDF for [employer]. [JVD] : no jugular venous distention  [Normal Breath Sounds] : Normal breath sounds [Ankle Swelling (On Exam)] : present [Abdomen Tenderness] : ~T ~M No abdominal tenderness [Skin Ulcer] : ulcer [Alert] : alert [Oriented to Person] : oriented to person [Oriented to Time] : oriented to time [Oriented to Place] : oriented to place [Anxious] : anxious [de-identified] : NAD, ambulatory [de-identified] : AT [de-identified] : supple [de-identified] : soft [de-identified] :  tender to palpation LLE circumferential erythema in the gaiter distribution

## 2021-04-08 NOTE — HISTORY OF PRESENT ILLNESS
[FreeTextEntry1] : \par \par Ms. ASHANTI FLOWER   presents to the office with a redness and swelling since 4/5/2021. The wound is located on  the left leg. The patient has complaints of pain. The patient says she has had fevers and chills.\par \par Friday started feeling unwell, Fevers and shivers with muscle aches, diarrhea, and headache. Leg started to hurt and turn red on Monday, patient had a telehealth visit with the urgent care center and they put her on Clindamycin that she has been on for 2 days as of 4/7/2021 without improvement.

## 2021-04-09 LAB
ANION GAP SERPL CALC-SCNC: 12 MMOL/L — SIGNIFICANT CHANGE UP (ref 7–14)
BASOPHILS # BLD AUTO: 0 K/UL — SIGNIFICANT CHANGE UP (ref 0–0.2)
BASOPHILS NFR BLD AUTO: 0 % — SIGNIFICANT CHANGE UP (ref 0–2)
BUN SERPL-MCNC: 11 MG/DL — SIGNIFICANT CHANGE UP (ref 7–23)
CALCIUM SERPL-MCNC: 9.1 MG/DL — SIGNIFICANT CHANGE UP (ref 8.4–10.5)
CHLORIDE SERPL-SCNC: 103 MMOL/L — SIGNIFICANT CHANGE UP (ref 98–107)
CO2 SERPL-SCNC: 21 MMOL/L — LOW (ref 22–31)
CREAT SERPL-MCNC: 0.61 MG/DL — SIGNIFICANT CHANGE UP (ref 0.5–1.3)
EOSINOPHIL # BLD AUTO: 0.2 K/UL — SIGNIFICANT CHANGE UP (ref 0–0.5)
EOSINOPHIL NFR BLD AUTO: 2.6 % — SIGNIFICANT CHANGE UP (ref 0–6)
GLUCOSE SERPL-MCNC: 100 MG/DL — HIGH (ref 70–99)
HCT VFR BLD CALC: 34.2 % — LOW (ref 34.5–45)
HGB BLD-MCNC: 10.9 G/DL — LOW (ref 11.5–15.5)
IANC: 5.33 K/UL — SIGNIFICANT CHANGE UP (ref 1.5–8.5)
LYMPHOCYTES # BLD AUTO: 0.96 K/UL — LOW (ref 1–3.3)
LYMPHOCYTES # BLD AUTO: 12.4 % — LOW (ref 13–44)
MAGNESIUM SERPL-MCNC: 2 MG/DL — SIGNIFICANT CHANGE UP (ref 1.6–2.6)
MCHC RBC-ENTMCNC: 28.9 PG — SIGNIFICANT CHANGE UP (ref 27–34)
MCHC RBC-ENTMCNC: 31.9 GM/DL — LOW (ref 32–36)
MCV RBC AUTO: 90.7 FL — SIGNIFICANT CHANGE UP (ref 80–100)
MONOCYTES # BLD AUTO: 0.48 K/UL — SIGNIFICANT CHANGE UP (ref 0–0.9)
MONOCYTES NFR BLD AUTO: 6.2 % — SIGNIFICANT CHANGE UP (ref 2–14)
NEUTROPHILS # BLD AUTO: 5.77 K/UL — SIGNIFICANT CHANGE UP (ref 1.8–7.4)
NEUTROPHILS NFR BLD AUTO: 72.6 % — SIGNIFICANT CHANGE UP (ref 43–77)
PHOSPHATE SERPL-MCNC: 3.7 MG/DL — SIGNIFICANT CHANGE UP (ref 2.5–4.5)
PLATELET # BLD AUTO: 259 K/UL — SIGNIFICANT CHANGE UP (ref 150–400)
POTASSIUM SERPL-MCNC: 3.5 MMOL/L — SIGNIFICANT CHANGE UP (ref 3.5–5.3)
POTASSIUM SERPL-SCNC: 3.5 MMOL/L — SIGNIFICANT CHANGE UP (ref 3.5–5.3)
RBC # BLD: 3.77 M/UL — LOW (ref 3.8–5.2)
RBC # FLD: 13.1 % — SIGNIFICANT CHANGE UP (ref 10.3–14.5)
SODIUM SERPL-SCNC: 136 MMOL/L — SIGNIFICANT CHANGE UP (ref 135–145)
VANCOMYCIN TROUGH SERPL-MCNC: 12.4 UG/ML — SIGNIFICANT CHANGE UP (ref 10–20)
VANCOMYCIN TROUGH SERPL-MCNC: 14.5 UG/ML — SIGNIFICANT CHANGE UP (ref 10–20)
WBC # BLD: 7.75 K/UL — SIGNIFICANT CHANGE UP (ref 3.8–10.5)
WBC # FLD AUTO: 7.75 K/UL — SIGNIFICANT CHANGE UP (ref 3.8–10.5)

## 2021-04-09 PROCEDURE — 99233 SBSQ HOSP IP/OBS HIGH 50: CPT

## 2021-04-09 RX ORDER — OXYCODONE HYDROCHLORIDE 5 MG/1
10 TABLET ORAL EVERY 6 HOURS
Refills: 0 | Status: DISCONTINUED | OUTPATIENT
Start: 2021-04-09 | End: 2021-04-16

## 2021-04-09 RX ORDER — POTASSIUM CHLORIDE 20 MEQ
40 PACKET (EA) ORAL ONCE
Refills: 0 | Status: COMPLETED | OUTPATIENT
Start: 2021-04-09 | End: 2021-04-09

## 2021-04-09 RX ADMIN — Medication 12.5 MILLIGRAM(S): at 05:24

## 2021-04-09 RX ADMIN — Medication 1000 MILLIGRAM(S): at 05:50

## 2021-04-09 RX ADMIN — APIXABAN 5 MILLIGRAM(S): 2.5 TABLET, FILM COATED ORAL at 17:20

## 2021-04-09 RX ADMIN — Medication 300 MILLIGRAM(S): at 02:17

## 2021-04-09 RX ADMIN — Medication 40 MILLIEQUIVALENT(S): at 13:28

## 2021-04-09 RX ADMIN — Medication 12.5 MILLIGRAM(S): at 17:20

## 2021-04-09 RX ADMIN — OXYCODONE HYDROCHLORIDE 10 MILLIGRAM(S): 5 TABLET ORAL at 15:21

## 2021-04-09 RX ADMIN — OXYCODONE HYDROCHLORIDE 10 MILLIGRAM(S): 5 TABLET ORAL at 16:21

## 2021-04-09 RX ADMIN — APIXABAN 5 MILLIGRAM(S): 2.5 TABLET, FILM COATED ORAL at 05:24

## 2021-04-09 RX ADMIN — Medication 300 MILLIGRAM(S): at 15:21

## 2021-04-09 RX ADMIN — PANTOPRAZOLE SODIUM 40 MILLIGRAM(S): 20 TABLET, DELAYED RELEASE ORAL at 05:25

## 2021-04-09 RX ADMIN — FAMOTIDINE 20 MILLIGRAM(S): 10 INJECTION INTRAVENOUS at 17:21

## 2021-04-09 RX ADMIN — Medication 400 MILLIGRAM(S): at 05:20

## 2021-04-09 NOTE — PROGRESS NOTE ADULT - PROBLEM SELECTOR PLAN 4
DVT PPx: Lovenox  DIET: Regular  DISPO: Likely home DVT PPx: Eliquis  DIET: Regular  DISPO: Likely home

## 2021-04-09 NOTE — PROGRESS NOTE ADULT - PROBLEM SELECTOR PLAN 1
Pt w/ cellulitis to LL extremity, s/p failed trial of clindamycin, admitted for IV abx.  - C/w vancomycin - Will consider narrowing pending culture results  - BCx: NGTD  - F/u MRSA swab  - C/w Tylenol PRN for mild/moderate pain; oxycodone 5mg PO for severe pain Pt w/ cellulitis to LL extremity, s/p failed trial of clindamycin, admitted for IV abx.  - C/w vancomycin - Will consider narrowing pending culture results  - BCx: NGTD  - F/u MRSA swab  - C/w Tylenol PRN for mild/moderate pain; increase oxycodone to 10mg PO for severe pain

## 2021-04-10 LAB
ANION GAP SERPL CALC-SCNC: 10 MMOL/L — SIGNIFICANT CHANGE UP (ref 7–14)
BASOPHILS # BLD AUTO: 0.04 K/UL — SIGNIFICANT CHANGE UP (ref 0–0.2)
BASOPHILS NFR BLD AUTO: 0.5 % — SIGNIFICANT CHANGE UP (ref 0–2)
BUN SERPL-MCNC: 8 MG/DL — SIGNIFICANT CHANGE UP (ref 7–23)
CALCIUM SERPL-MCNC: 9.4 MG/DL — SIGNIFICANT CHANGE UP (ref 8.4–10.5)
CHLORIDE SERPL-SCNC: 104 MMOL/L — SIGNIFICANT CHANGE UP (ref 98–107)
CO2 SERPL-SCNC: 24 MMOL/L — SIGNIFICANT CHANGE UP (ref 22–31)
CREAT SERPL-MCNC: 0.61 MG/DL — SIGNIFICANT CHANGE UP (ref 0.5–1.3)
CULTURE RESULTS: SIGNIFICANT CHANGE UP
EOSINOPHIL # BLD AUTO: 0.25 K/UL — SIGNIFICANT CHANGE UP (ref 0–0.5)
EOSINOPHIL NFR BLD AUTO: 3 % — SIGNIFICANT CHANGE UP (ref 0–6)
GLUCOSE SERPL-MCNC: 88 MG/DL — SIGNIFICANT CHANGE UP (ref 70–99)
HCT VFR BLD CALC: 36 % — SIGNIFICANT CHANGE UP (ref 34.5–45)
HGB BLD-MCNC: 11.8 G/DL — SIGNIFICANT CHANGE UP (ref 11.5–15.5)
IANC: 5.19 K/UL — SIGNIFICANT CHANGE UP (ref 1.5–8.5)
IMM GRANULOCYTES NFR BLD AUTO: 0.7 % — SIGNIFICANT CHANGE UP (ref 0–1.5)
LYMPHOCYTES # BLD AUTO: 2.01 K/UL — SIGNIFICANT CHANGE UP (ref 1–3.3)
LYMPHOCYTES # BLD AUTO: 24.4 % — SIGNIFICANT CHANGE UP (ref 13–44)
MAGNESIUM SERPL-MCNC: 2.1 MG/DL — SIGNIFICANT CHANGE UP (ref 1.6–2.6)
MCHC RBC-ENTMCNC: 29.4 PG — SIGNIFICANT CHANGE UP (ref 27–34)
MCHC RBC-ENTMCNC: 32.8 GM/DL — SIGNIFICANT CHANGE UP (ref 32–36)
MCV RBC AUTO: 89.6 FL — SIGNIFICANT CHANGE UP (ref 80–100)
MONOCYTES # BLD AUTO: 0.7 K/UL — SIGNIFICANT CHANGE UP (ref 0–0.9)
MONOCYTES NFR BLD AUTO: 8.5 % — SIGNIFICANT CHANGE UP (ref 2–14)
NEUTROPHILS # BLD AUTO: 5.19 K/UL — SIGNIFICANT CHANGE UP (ref 1.8–7.4)
NEUTROPHILS NFR BLD AUTO: 62.9 % — SIGNIFICANT CHANGE UP (ref 43–77)
NRBC # BLD: 0 /100 WBCS — SIGNIFICANT CHANGE UP
NRBC # FLD: 0 K/UL — SIGNIFICANT CHANGE UP
PHOSPHATE SERPL-MCNC: 3.7 MG/DL — SIGNIFICANT CHANGE UP (ref 2.5–4.5)
PLATELET # BLD AUTO: 350 K/UL — SIGNIFICANT CHANGE UP (ref 150–400)
POTASSIUM SERPL-MCNC: 4.1 MMOL/L — SIGNIFICANT CHANGE UP (ref 3.5–5.3)
POTASSIUM SERPL-SCNC: 4.1 MMOL/L — SIGNIFICANT CHANGE UP (ref 3.5–5.3)
RBC # BLD: 4.02 M/UL — SIGNIFICANT CHANGE UP (ref 3.8–5.2)
RBC # FLD: 13 % — SIGNIFICANT CHANGE UP (ref 10.3–14.5)
SODIUM SERPL-SCNC: 138 MMOL/L — SIGNIFICANT CHANGE UP (ref 135–145)
SPECIMEN SOURCE: SIGNIFICANT CHANGE UP
WBC # BLD: 8.25 K/UL — SIGNIFICANT CHANGE UP (ref 3.8–10.5)
WBC # FLD AUTO: 8.25 K/UL — SIGNIFICANT CHANGE UP (ref 3.8–10.5)

## 2021-04-10 PROCEDURE — 99233 SBSQ HOSP IP/OBS HIGH 50: CPT

## 2021-04-10 RX ADMIN — APIXABAN 5 MILLIGRAM(S): 2.5 TABLET, FILM COATED ORAL at 06:28

## 2021-04-10 RX ADMIN — APIXABAN 5 MILLIGRAM(S): 2.5 TABLET, FILM COATED ORAL at 17:06

## 2021-04-10 RX ADMIN — OXYCODONE HYDROCHLORIDE 10 MILLIGRAM(S): 5 TABLET ORAL at 09:18

## 2021-04-10 RX ADMIN — Medication 300 MILLIGRAM(S): at 02:20

## 2021-04-10 RX ADMIN — Medication 12.5 MILLIGRAM(S): at 17:04

## 2021-04-10 RX ADMIN — Medication 300 MILLIGRAM(S): at 15:07

## 2021-04-10 RX ADMIN — OXYCODONE HYDROCHLORIDE 10 MILLIGRAM(S): 5 TABLET ORAL at 00:03

## 2021-04-10 RX ADMIN — PANTOPRAZOLE SODIUM 40 MILLIGRAM(S): 20 TABLET, DELAYED RELEASE ORAL at 06:27

## 2021-04-10 RX ADMIN — OXYCODONE HYDROCHLORIDE 10 MILLIGRAM(S): 5 TABLET ORAL at 10:18

## 2021-04-10 RX ADMIN — OXYCODONE HYDROCHLORIDE 10 MILLIGRAM(S): 5 TABLET ORAL at 00:45

## 2021-04-10 RX ADMIN — Medication 12.5 MILLIGRAM(S): at 06:28

## 2021-04-10 RX ADMIN — OXYCODONE HYDROCHLORIDE 10 MILLIGRAM(S): 5 TABLET ORAL at 19:48

## 2021-04-10 RX ADMIN — FAMOTIDINE 20 MILLIGRAM(S): 10 INJECTION INTRAVENOUS at 17:06

## 2021-04-10 RX ADMIN — OXYCODONE HYDROCHLORIDE 10 MILLIGRAM(S): 5 TABLET ORAL at 18:48

## 2021-04-10 NOTE — PROGRESS NOTE ADULT - PROBLEM SELECTOR PLAN 1
Pt w/ cellulitis to LL extremity, s/p failed trial of clindamycin, admitted for IV abx.  - C/w vancomycin - Will consider narrowing pending culture results  - BCx: NGTD  - F/u MRSA swab  - C/w Tylenol PRN for mild/moderate pain; increase oxycodone to 10mg PO for severe pain

## 2021-04-11 LAB
ANION GAP SERPL CALC-SCNC: 11 MMOL/L — SIGNIFICANT CHANGE UP (ref 7–14)
BASOPHILS # BLD AUTO: 0.03 K/UL — SIGNIFICANT CHANGE UP (ref 0–0.2)
BASOPHILS NFR BLD AUTO: 0.4 % — SIGNIFICANT CHANGE UP (ref 0–2)
BUN SERPL-MCNC: 8 MG/DL — SIGNIFICANT CHANGE UP (ref 7–23)
CALCIUM SERPL-MCNC: 9.1 MG/DL — SIGNIFICANT CHANGE UP (ref 8.4–10.5)
CHLORIDE SERPL-SCNC: 104 MMOL/L — SIGNIFICANT CHANGE UP (ref 98–107)
CO2 SERPL-SCNC: 23 MMOL/L — SIGNIFICANT CHANGE UP (ref 22–31)
CREAT SERPL-MCNC: 0.66 MG/DL — SIGNIFICANT CHANGE UP (ref 0.5–1.3)
EOSINOPHIL # BLD AUTO: 0.16 K/UL — SIGNIFICANT CHANGE UP (ref 0–0.5)
EOSINOPHIL NFR BLD AUTO: 2.2 % — SIGNIFICANT CHANGE UP (ref 0–6)
GLUCOSE SERPL-MCNC: 93 MG/DL — SIGNIFICANT CHANGE UP (ref 70–99)
HCT VFR BLD CALC: 35.4 % — SIGNIFICANT CHANGE UP (ref 34.5–45)
HGB BLD-MCNC: 11.1 G/DL — LOW (ref 11.5–15.5)
IANC: 4.74 K/UL — SIGNIFICANT CHANGE UP (ref 1.5–8.5)
IMM GRANULOCYTES NFR BLD AUTO: 0.8 % — SIGNIFICANT CHANGE UP (ref 0–1.5)
LYMPHOCYTES # BLD AUTO: 1.53 K/UL — SIGNIFICANT CHANGE UP (ref 1–3.3)
LYMPHOCYTES # BLD AUTO: 21.4 % — SIGNIFICANT CHANGE UP (ref 13–44)
MAGNESIUM SERPL-MCNC: 2.1 MG/DL — SIGNIFICANT CHANGE UP (ref 1.6–2.6)
MCHC RBC-ENTMCNC: 28.6 PG — SIGNIFICANT CHANGE UP (ref 27–34)
MCHC RBC-ENTMCNC: 31.4 GM/DL — LOW (ref 32–36)
MCV RBC AUTO: 91.2 FL — SIGNIFICANT CHANGE UP (ref 80–100)
MONOCYTES # BLD AUTO: 0.63 K/UL — SIGNIFICANT CHANGE UP (ref 0–0.9)
MONOCYTES NFR BLD AUTO: 8.8 % — SIGNIFICANT CHANGE UP (ref 2–14)
NEUTROPHILS # BLD AUTO: 4.74 K/UL — SIGNIFICANT CHANGE UP (ref 1.8–7.4)
NEUTROPHILS NFR BLD AUTO: 66.4 % — SIGNIFICANT CHANGE UP (ref 43–77)
NRBC # BLD: 0 /100 WBCS — SIGNIFICANT CHANGE UP
NRBC # FLD: 0 K/UL — SIGNIFICANT CHANGE UP
PHOSPHATE SERPL-MCNC: 3.5 MG/DL — SIGNIFICANT CHANGE UP (ref 2.5–4.5)
PLATELET # BLD AUTO: 332 K/UL — SIGNIFICANT CHANGE UP (ref 150–400)
POTASSIUM SERPL-MCNC: 4.3 MMOL/L — SIGNIFICANT CHANGE UP (ref 3.5–5.3)
POTASSIUM SERPL-SCNC: 4.3 MMOL/L — SIGNIFICANT CHANGE UP (ref 3.5–5.3)
RBC # BLD: 3.88 M/UL — SIGNIFICANT CHANGE UP (ref 3.8–5.2)
RBC # FLD: 13.1 % — SIGNIFICANT CHANGE UP (ref 10.3–14.5)
SODIUM SERPL-SCNC: 138 MMOL/L — SIGNIFICANT CHANGE UP (ref 135–145)
VANCOMYCIN TROUGH SERPL-MCNC: 13.7 UG/ML — SIGNIFICANT CHANGE UP (ref 10–20)
WBC # BLD: 7.15 K/UL — SIGNIFICANT CHANGE UP (ref 3.8–10.5)
WBC # FLD AUTO: 7.15 K/UL — SIGNIFICANT CHANGE UP (ref 3.8–10.5)

## 2021-04-11 PROCEDURE — 99233 SBSQ HOSP IP/OBS HIGH 50: CPT

## 2021-04-11 RX ADMIN — OXYCODONE HYDROCHLORIDE 10 MILLIGRAM(S): 5 TABLET ORAL at 10:13

## 2021-04-11 RX ADMIN — OXYCODONE HYDROCHLORIDE 10 MILLIGRAM(S): 5 TABLET ORAL at 11:13

## 2021-04-11 RX ADMIN — APIXABAN 5 MILLIGRAM(S): 2.5 TABLET, FILM COATED ORAL at 05:45

## 2021-04-11 RX ADMIN — Medication 300 MILLIGRAM(S): at 23:43

## 2021-04-11 RX ADMIN — PANTOPRAZOLE SODIUM 40 MILLIGRAM(S): 20 TABLET, DELAYED RELEASE ORAL at 05:45

## 2021-04-11 RX ADMIN — Medication 12.5 MILLIGRAM(S): at 05:45

## 2021-04-11 RX ADMIN — OXYCODONE HYDROCHLORIDE 10 MILLIGRAM(S): 5 TABLET ORAL at 19:59

## 2021-04-11 RX ADMIN — Medication 300 MILLIGRAM(S): at 05:46

## 2021-04-11 RX ADMIN — APIXABAN 5 MILLIGRAM(S): 2.5 TABLET, FILM COATED ORAL at 17:29

## 2021-04-11 RX ADMIN — FAMOTIDINE 20 MILLIGRAM(S): 10 INJECTION INTRAVENOUS at 17:29

## 2021-04-11 RX ADMIN — OXYCODONE HYDROCHLORIDE 10 MILLIGRAM(S): 5 TABLET ORAL at 20:45

## 2021-04-11 RX ADMIN — Medication 12.5 MILLIGRAM(S): at 17:29

## 2021-04-11 NOTE — PROGRESS NOTE ADULT - PROBLEM SELECTOR PLAN 1
Pt w/ cellulitis to LL extremity, s/p failed trial of clindamycin, admitted for IV abx.  - C/w vancomycin pending clinical improvement of LLE cellulitis  - BCx: NGTD  - MRSA swab negative  - C/w Tylenol PRN for mild/moderate pain; oxycodone 10mg PO for severe pain

## 2021-04-12 LAB
ALBUMIN SERPL ELPH-MCNC: 3 G/DL — LOW (ref 3.3–5)
ALP SERPL-CCNC: 109 U/L — SIGNIFICANT CHANGE UP (ref 40–120)
ALT FLD-CCNC: 32 U/L — SIGNIFICANT CHANGE UP (ref 4–33)
ANION GAP SERPL CALC-SCNC: 12 MMOL/L — SIGNIFICANT CHANGE UP (ref 7–14)
AST SERPL-CCNC: 41 U/L — HIGH (ref 4–32)
BILIRUB SERPL-MCNC: 0.4 MG/DL — SIGNIFICANT CHANGE UP (ref 0.2–1.2)
BUN SERPL-MCNC: 8 MG/DL — SIGNIFICANT CHANGE UP (ref 7–23)
CALCIUM SERPL-MCNC: 9.2 MG/DL — SIGNIFICANT CHANGE UP (ref 8.4–10.5)
CHLORIDE SERPL-SCNC: 100 MMOL/L — SIGNIFICANT CHANGE UP (ref 98–107)
CO2 SERPL-SCNC: 20 MMOL/L — LOW (ref 22–31)
CREAT SERPL-MCNC: 0.67 MG/DL — SIGNIFICANT CHANGE UP (ref 0.5–1.3)
GLUCOSE SERPL-MCNC: 85 MG/DL — SIGNIFICANT CHANGE UP (ref 70–99)
MAGNESIUM SERPL-MCNC: 2.3 MG/DL — SIGNIFICANT CHANGE UP (ref 1.6–2.6)
PHOSPHATE SERPL-MCNC: 3.8 MG/DL — SIGNIFICANT CHANGE UP (ref 2.5–4.5)
POTASSIUM SERPL-MCNC: 5.2 MMOL/L — SIGNIFICANT CHANGE UP (ref 3.5–5.3)
POTASSIUM SERPL-SCNC: 5.2 MMOL/L — SIGNIFICANT CHANGE UP (ref 3.5–5.3)
PROT SERPL-MCNC: 7.4 G/DL — SIGNIFICANT CHANGE UP (ref 6–8.3)
SODIUM SERPL-SCNC: 132 MMOL/L — LOW (ref 135–145)

## 2021-04-12 PROCEDURE — 99233 SBSQ HOSP IP/OBS HIGH 50: CPT

## 2021-04-12 PROCEDURE — 73702 CT LWR EXTREMITY W/O&W/DYE: CPT | Mod: 26,LT

## 2021-04-12 RX ORDER — PETROLATUM,WHITE
1 JELLY (GRAM) TOPICAL
Refills: 0 | Status: DISCONTINUED | OUTPATIENT
Start: 2021-04-12 | End: 2021-04-16

## 2021-04-12 RX ORDER — ONDANSETRON 8 MG/1
4 TABLET, FILM COATED ORAL ONCE
Refills: 0 | Status: COMPLETED | OUTPATIENT
Start: 2021-04-12 | End: 2021-04-12

## 2021-04-12 RX ORDER — VANCOMYCIN HCL 1 G
1500 VIAL (EA) INTRAVENOUS EVERY 12 HOURS
Refills: 0 | Status: DISCONTINUED | OUTPATIENT
Start: 2021-04-12 | End: 2021-04-13

## 2021-04-12 RX ORDER — VANCOMYCIN HCL 1 G
1250 VIAL (EA) INTRAVENOUS EVERY 8 HOURS
Refills: 0 | Status: DISCONTINUED | OUTPATIENT
Start: 2021-04-12 | End: 2021-04-12

## 2021-04-12 RX ORDER — VANCOMYCIN HCL 1 G
1500 VIAL (EA) INTRAVENOUS EVERY 8 HOURS
Refills: 0 | Status: DISCONTINUED | OUTPATIENT
Start: 2021-04-12 | End: 2021-04-12

## 2021-04-12 RX ADMIN — PANTOPRAZOLE SODIUM 40 MILLIGRAM(S): 20 TABLET, DELAYED RELEASE ORAL at 06:38

## 2021-04-12 RX ADMIN — Medication 12.5 MILLIGRAM(S): at 06:38

## 2021-04-12 RX ADMIN — APIXABAN 5 MILLIGRAM(S): 2.5 TABLET, FILM COATED ORAL at 06:38

## 2021-04-12 RX ADMIN — Medication 300 MILLIGRAM(S): at 23:21

## 2021-04-12 RX ADMIN — OXYCODONE HYDROCHLORIDE 10 MILLIGRAM(S): 5 TABLET ORAL at 07:02

## 2021-04-12 RX ADMIN — OXYCODONE HYDROCHLORIDE 10 MILLIGRAM(S): 5 TABLET ORAL at 08:02

## 2021-04-12 RX ADMIN — Medication 12.5 MILLIGRAM(S): at 18:18

## 2021-04-12 RX ADMIN — OXYCODONE HYDROCHLORIDE 10 MILLIGRAM(S): 5 TABLET ORAL at 21:20

## 2021-04-12 RX ADMIN — OXYCODONE HYDROCHLORIDE 10 MILLIGRAM(S): 5 TABLET ORAL at 13:09

## 2021-04-12 RX ADMIN — FAMOTIDINE 20 MILLIGRAM(S): 10 INJECTION INTRAVENOUS at 18:18

## 2021-04-12 RX ADMIN — OXYCODONE HYDROCHLORIDE 10 MILLIGRAM(S): 5 TABLET ORAL at 21:19

## 2021-04-12 RX ADMIN — APIXABAN 5 MILLIGRAM(S): 2.5 TABLET, FILM COATED ORAL at 18:18

## 2021-04-12 RX ADMIN — Medication 300 MILLIGRAM(S): at 11:04

## 2021-04-12 RX ADMIN — ONDANSETRON 4 MILLIGRAM(S): 8 TABLET, FILM COATED ORAL at 13:40

## 2021-04-12 NOTE — PROGRESS NOTE ADULT - PROBLEM SELECTOR PLAN 1
Pt w/ cellulitis to LL extremity, s/p failed trial of clindamycin, admitted for IV abx.  - C/w vancomycin pending clinical improvement of LLE cellulitis  - BCx: NGTD  - MRSA swab negative  - C/w Tylenol PRN for mild/moderate pain; oxycodone 10mg PO for severe pain  - F/u CT w/wout contrast of LLE to r/o abscess  - If continues to worsen despite IV vanc will consult ID

## 2021-04-13 LAB
ALBUMIN SERPL ELPH-MCNC: 3.4 G/DL — SIGNIFICANT CHANGE UP (ref 3.3–5)
ALP SERPL-CCNC: 105 U/L — SIGNIFICANT CHANGE UP (ref 40–120)
ALT FLD-CCNC: 32 U/L — SIGNIFICANT CHANGE UP (ref 4–33)
ANION GAP SERPL CALC-SCNC: 11 MMOL/L — SIGNIFICANT CHANGE UP (ref 7–14)
AST SERPL-CCNC: 24 U/L — SIGNIFICANT CHANGE UP (ref 4–32)
BASOPHILS # BLD AUTO: 0.07 K/UL — SIGNIFICANT CHANGE UP (ref 0–0.2)
BASOPHILS NFR BLD AUTO: 0.9 % — SIGNIFICANT CHANGE UP (ref 0–2)
BILIRUB SERPL-MCNC: 0.4 MG/DL — SIGNIFICANT CHANGE UP (ref 0.2–1.2)
BUN SERPL-MCNC: 9 MG/DL — SIGNIFICANT CHANGE UP (ref 7–23)
CALCIUM SERPL-MCNC: 9.5 MG/DL — SIGNIFICANT CHANGE UP (ref 8.4–10.5)
CHLORIDE SERPL-SCNC: 100 MMOL/L — SIGNIFICANT CHANGE UP (ref 98–107)
CO2 SERPL-SCNC: 27 MMOL/L — SIGNIFICANT CHANGE UP (ref 22–31)
CREAT SERPL-MCNC: 0.79 MG/DL — SIGNIFICANT CHANGE UP (ref 0.5–1.3)
CULTURE RESULTS: SIGNIFICANT CHANGE UP
CULTURE RESULTS: SIGNIFICANT CHANGE UP
EOSINOPHIL # BLD AUTO: 0.07 K/UL — SIGNIFICANT CHANGE UP (ref 0–0.5)
EOSINOPHIL NFR BLD AUTO: 0.9 % — SIGNIFICANT CHANGE UP (ref 0–6)
GLUCOSE SERPL-MCNC: 97 MG/DL — SIGNIFICANT CHANGE UP (ref 70–99)
HCT VFR BLD CALC: 36 % — SIGNIFICANT CHANGE UP (ref 34.5–45)
HGB BLD-MCNC: 11.4 G/DL — LOW (ref 11.5–15.5)
IANC: 5.42 K/UL — SIGNIFICANT CHANGE UP (ref 1.5–8.5)
LYMPHOCYTES # BLD AUTO: 1.24 K/UL — SIGNIFICANT CHANGE UP (ref 1–3.3)
LYMPHOCYTES # BLD AUTO: 14.9 % — SIGNIFICANT CHANGE UP (ref 13–44)
MAGNESIUM SERPL-MCNC: 2.2 MG/DL — SIGNIFICANT CHANGE UP (ref 1.6–2.6)
MCHC RBC-ENTMCNC: 29.2 PG — SIGNIFICANT CHANGE UP (ref 27–34)
MCHC RBC-ENTMCNC: 31.7 GM/DL — LOW (ref 32–36)
MCV RBC AUTO: 92.3 FL — SIGNIFICANT CHANGE UP (ref 80–100)
MONOCYTES # BLD AUTO: 0.8 K/UL — SIGNIFICANT CHANGE UP (ref 0–0.9)
MONOCYTES NFR BLD AUTO: 9.6 % — SIGNIFICANT CHANGE UP (ref 2–14)
NEUTROPHILS # BLD AUTO: 5.89 K/UL — SIGNIFICANT CHANGE UP (ref 1.8–7.4)
NEUTROPHILS NFR BLD AUTO: 71.1 % — SIGNIFICANT CHANGE UP (ref 43–77)
PHOSPHATE SERPL-MCNC: 4.2 MG/DL — SIGNIFICANT CHANGE UP (ref 2.5–4.5)
PLATELET # BLD AUTO: 377 K/UL — SIGNIFICANT CHANGE UP (ref 150–400)
POTASSIUM SERPL-MCNC: 4.1 MMOL/L — SIGNIFICANT CHANGE UP (ref 3.5–5.3)
POTASSIUM SERPL-SCNC: 4.1 MMOL/L — SIGNIFICANT CHANGE UP (ref 3.5–5.3)
PROT SERPL-MCNC: 7.2 G/DL — SIGNIFICANT CHANGE UP (ref 6–8.3)
RBC # BLD: 3.9 M/UL — SIGNIFICANT CHANGE UP (ref 3.8–5.2)
RBC # FLD: 13 % — SIGNIFICANT CHANGE UP (ref 10.3–14.5)
SODIUM SERPL-SCNC: 138 MMOL/L — SIGNIFICANT CHANGE UP (ref 135–145)
SPECIMEN SOURCE: SIGNIFICANT CHANGE UP
SPECIMEN SOURCE: SIGNIFICANT CHANGE UP
WBC # BLD: 8.29 K/UL — SIGNIFICANT CHANGE UP (ref 3.8–10.5)
WBC # FLD AUTO: 8.29 K/UL — SIGNIFICANT CHANGE UP (ref 3.8–10.5)

## 2021-04-13 PROCEDURE — 99221 1ST HOSP IP/OBS SF/LOW 40: CPT

## 2021-04-13 PROCEDURE — 99233 SBSQ HOSP IP/OBS HIGH 50: CPT

## 2021-04-13 PROCEDURE — 99221 1ST HOSP IP/OBS SF/LOW 40: CPT | Mod: GC

## 2021-04-13 RX ORDER — NYSTATIN CREAM 100000 [USP'U]/G
1 CREAM TOPICAL
Refills: 0 | Status: DISCONTINUED | OUTPATIENT
Start: 2021-04-13 | End: 2021-04-16

## 2021-04-13 RX ORDER — CEFAZOLIN SODIUM 1 G
2000 VIAL (EA) INJECTION EVERY 8 HOURS
Refills: 0 | Status: DISCONTINUED | OUTPATIENT
Start: 2021-04-13 | End: 2021-04-16

## 2021-04-13 RX ADMIN — Medication 100 MILLIGRAM(S): at 13:58

## 2021-04-13 RX ADMIN — OXYCODONE HYDROCHLORIDE 10 MILLIGRAM(S): 5 TABLET ORAL at 07:37

## 2021-04-13 RX ADMIN — Medication 12.5 MILLIGRAM(S): at 06:53

## 2021-04-13 RX ADMIN — Medication 12.5 MILLIGRAM(S): at 17:24

## 2021-04-13 RX ADMIN — Medication 300 MILLIGRAM(S): at 10:15

## 2021-04-13 RX ADMIN — Medication 100 MILLIGRAM(S): at 21:29

## 2021-04-13 RX ADMIN — OXYCODONE HYDROCHLORIDE 10 MILLIGRAM(S): 5 TABLET ORAL at 13:54

## 2021-04-13 RX ADMIN — OXYCODONE HYDROCHLORIDE 10 MILLIGRAM(S): 5 TABLET ORAL at 12:54

## 2021-04-13 RX ADMIN — APIXABAN 5 MILLIGRAM(S): 2.5 TABLET, FILM COATED ORAL at 06:52

## 2021-04-13 RX ADMIN — NYSTATIN CREAM 1 APPLICATION(S): 100000 CREAM TOPICAL at 17:25

## 2021-04-13 RX ADMIN — APIXABAN 5 MILLIGRAM(S): 2.5 TABLET, FILM COATED ORAL at 17:24

## 2021-04-13 RX ADMIN — PANTOPRAZOLE SODIUM 40 MILLIGRAM(S): 20 TABLET, DELAYED RELEASE ORAL at 06:52

## 2021-04-13 RX ADMIN — OXYCODONE HYDROCHLORIDE 10 MILLIGRAM(S): 5 TABLET ORAL at 20:56

## 2021-04-13 RX ADMIN — OXYCODONE HYDROCHLORIDE 10 MILLIGRAM(S): 5 TABLET ORAL at 20:20

## 2021-04-13 RX ADMIN — FAMOTIDINE 20 MILLIGRAM(S): 10 INJECTION INTRAVENOUS at 17:24

## 2021-04-13 RX ADMIN — OXYCODONE HYDROCHLORIDE 10 MILLIGRAM(S): 5 TABLET ORAL at 06:51

## 2021-04-13 NOTE — CONSULT NOTE ADULT - ASSESSMENT
59yo F w/ h/o paroxysmal Afib (on Eliquis), PAD, gastric sleeve, morbid obesity (BMI=44.1), not diabetic (a1c=5.4%) p/w left leg cellulitis after failing PO clindamycin. She had intermittent fever outpt. She was admitted on 4/7. Getting IV vancomycin here with level around 12-14. CT on 4/12 shows cellulitis without abscess. No DVT. Nasal MRSA culture no isolated. WBC trending down from 12 to 8.     #Left leg cellulitis 59yo F w/ h/o paroxysmal Afib (on Eliquis), PAD, gastric sleeve, morbid obesity (BMI=44.1), not diabetic (a1c=5.4%) p/w left leg non-purulent cellulitis. She only took 4 doses of PO clindamycin but was getting worse with fever and increased redness so she came to ED. She was admitted on 4/7. Getting IV vancomycin here with level around 12-14. CT on 4/12 shows cellulitis without abscess. No DVT. Nasal MRSA culture no isolated. No more fever in the hospital. WBC trending down from 12 to 8. However, the redness of the leg is still significant without much improvement so ID is consulted for abx. She was told by her mother that she had PCN allergy, but likely not a real allergy.    #Left leg non-purulent cellulitis 61yo F w/ h/o paroxysmal Afib (on Eliquis), PAD, gastric sleeve, morbid obesity (BMI=44.1), not diabetic (a1c=5.4%) p/w left leg non-purulent cellulitis. She only took 4 doses of PO clindamycin but was getting worse with fever and increased redness so she came to ED. She was admitted on 4/7. Getting IV vancomycin here with level around 12-14. CT on 4/12 shows cellulitis without abscess. No DVT. Nasal MRSA culture no isolated. No more fever in the hospital. WBC trending down from 12 to 8. However, the redness of the leg is still significant without much improvement so ID is consulted for abx. She was told by her mother that she had PCN allergy, but likely not a real allergy.    #Left leg non-purulent cellulitis:  - Likely strep infection  - DC vancomycin  - Start Cefazolin 2g q8h    Jody Su MD, PGY4   ID fellow  Pager: [phone]  After 5pm/weekends call [phone]    d/w Dr. Sahil Nobles conveyed to primary team

## 2021-04-13 NOTE — CONSULT NOTE ADULT - ATTENDING COMMENTS
60F with obesity, left leg varicose vein ligation/excision with left leg cellulitis, likely streptococcal etiology with PCN allergy  -dc vancomycin   -ancef 2 gm iv q8  -doubt this is a real PCN allergy - pt ok with ancef, monitor for any reactions  -limb elevation    Farhat Baxter  Attending Physician   Division of Infectious Disease  Pager #[phone]  Available on Microsoft Teams also  After 5pm/weekend or no response, call #[phone]    D/w medical team

## 2021-04-13 NOTE — CONSULT NOTE ADULT - SUBJECTIVE AND OBJECTIVE BOX
Wound SURGERY CONSULT NOTE    FROM:   FOR:   Reason for Consult:    HPI:  61yo F w/ h/o paroxysmal Afib (on Eliquis), PAD, gastric sleeve p/w cellulitis after failing outpatient PO therapy. Per pt had intermittent fevers for 5d prior to admission, w/ Tmax 104. Two days after onset of fever she also noted worsening L leg swelling a/w erythema, pain, and warmth. Reports some relief w/ Tylenol. Pt had a telehealth appointment during which time her docotr prescribed clindamycin (pt completed 2d prior to admission) and recommended that the patient demarcate the erythema. Per pt the erythema continued to progress past the demarcation lines and she continued to have fevers so she decided to come to the ED. She endorses some associated nonbloody diarrhea beginning at the same time as fever onset and decreased PO intake. Pt denies CP, SOB/HERNANDEZ, N/V/C, dysuria/frequency/urgency. Recently quarantined x2wk due to COVID exposure (children and ); pt is fully vaccinated against COVID. Denies any recent travel.    IN THE ED: VSS (Afebrile, HR 80s, /70, RR 16 @ 100% on RA). WBC = 12.29. K+ = 3.2. Lactate = 1.2. Xray Foot/Ankle/Tibia: +soft tissue swelling. US Duplex: negative for DVT. Received morphine 4 x1, potassium repletion vancomycin x1, and NS 1L bolus. (07 Apr 2021 17:38)      PAST MEDICAL & SURGICAL HISTORY:  Eczema, unspecified type    Hypercholesterolemia    Obesity, unspecified classification, unspecified obesity type, unspecified whether serious comorbidity present    PAF (paroxysmal atrial fibrillation)  on Eliquis    Arthritis    History of nephritis  Glomerular nephritis as a chlld 1968 stable since then    Lipodermatosclerosis of both lower extremities    SIMA on CPAP    Venous insufficiency of lower extremity    Obesity, morbid, BMI 50 or higher    Status post gastric banding surgery  2008    Status post placement of implantable loop recorder  2/2019  S/P RFA of left GSV        REVIEW OF SYSTEMS      General:	 fatigued    Skin/Breast:  	  Ophthalmologic:  	  ENMT:	    Respiratory and Thorax:  	  Cardiovascular:	    Gastrointestinal:	 as per HPI    Genitourinary:	none offered    Musculoskeletal: painful left leg swelling    Neurological:	    Psychiatric:	    Hematology/Lymphatics: none offered	    Endocrine:	    Allergic/Immunologic:	    MEDICATIONS  (STANDING):  apixaban 5 milliGRAM(s) Oral two times a day  ceFAZolin   IVPB 2000 milliGRAM(s) IV Intermittent every 8 hours  famotidine    Tablet 20 milliGRAM(s) Oral daily  metoprolol tartrate 12.5 milliGRAM(s) Oral two times a day  nystatin Powder 1 Application(s) Topical two times a day  pantoprazole    Tablet 40 milliGRAM(s) Oral before breakfast    MEDICATIONS  (PRN):  AQUAPHOR (petrolatum Ointment) 1 Application(s) Topical two times a day PRN dry skin over cellulitic region  oxyCODONE    IR 10 milliGRAM(s) Oral every 6 hours PRN Severe Pain (7 - 10)      Allergies    penicillins (Unknown)    Intolerances        SOCIAL HISTORY:  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    FAMILY HISTORY:  FHx: heart disease      FH: lung cancer  mother    Family history of cardiomyopathy        Vital Signs Last 24 Hrs  T(C): 36.8 (13 Apr 2021 13:08), Max: 36.8 (13 Apr 2021 06:45)  T(F): 98.2 (13 Apr 2021 13:08), Max: 98.3 (13 Apr 2021 06:45)  HR: 58 (13 Apr 2021 13:08) (58 - 70)  BP: 117/70 (13 Apr 2021 13:08) (108/65 - 125/61)  BP(mean): --  RR: 18 (13 Apr 2021 13:08) (17 - 18)  SpO2: 97% (13 Apr 2021 13:08) (97% - 100%)    PHYSICAL EXAM:      Constitutional: Supine, at bedrest, overweight    Eyes: no scleral icterus    ENMT:    Neck:    Breasts:    Back: no rash    Respiratory: not labored    Cardiovascular: irregular rhythm at times    Gastrointestinal:    Genitourinary:    Rectal:    Extremities:  Edema of left distal thigh , lower leg and foot, With cellulitis to distal left thigh, associated with pain ,and erythema on palpation  No subcutaneous emphysema  No lymphorrhea  Healed superficial wound left heel  resolved interdigital dermatitis ( h/o athlete's foot)  Healed venous wound of left medial lower leg with lipodermatosclerosis     Vascular: s/p RFA of left GSV  scattered varicosities    Neurological: conversant    Skin: fair    Lymph Nodes:    Musculoskeletal: left leg elevated on pillows    Psychiatric:        LABS:                        11.4   8.29  )-----------( 377      ( 13 Apr 2021 07:48 )             36.0     04-13    138  |  100  |  9   ----------------------------<  97  4.1   |  27  |  0.79    Ca    9.5      13 Apr 2021 07:48  Phos  4.2     04-13  Mg     2.2     04-13    TPro  7.2  /  Alb  3.4  /  TBili  0.4  /  DBili  x   /  AST  24  /  ALT  32  /  AlkPhos  105  04-13          Albumin, Serum: 3.4 g/dL (04-13 @ 07:48)  Albumin, Serum: 3.0 g/dL (04-12 @ 07:17)  Albumin, Serum: 3.8 g/dL (04-08 @ 07:18)  Albumin, Serum: 4.1 g/dL (04-07 @ 14:30)      Left leg cellulitis possibly related to fungal interdigital dermatitis, and healed superficial wound of left heel    Continue Vancomycin, and left leg elevation    Add nystatin powder

## 2021-04-14 ENCOUNTER — TRANSCRIPTION ENCOUNTER (OUTPATIENT)
Age: 61
End: 2021-04-14

## 2021-04-14 LAB
ANION GAP SERPL CALC-SCNC: 8 MMOL/L — SIGNIFICANT CHANGE UP (ref 7–14)
BUN SERPL-MCNC: 9 MG/DL — SIGNIFICANT CHANGE UP (ref 7–23)
CALCIUM SERPL-MCNC: 9.4 MG/DL — SIGNIFICANT CHANGE UP (ref 8.4–10.5)
CHLORIDE SERPL-SCNC: 101 MMOL/L — SIGNIFICANT CHANGE UP (ref 98–107)
CO2 SERPL-SCNC: 27 MMOL/L — SIGNIFICANT CHANGE UP (ref 22–31)
CREAT SERPL-MCNC: 0.75 MG/DL — SIGNIFICANT CHANGE UP (ref 0.5–1.3)
GLUCOSE SERPL-MCNC: 101 MG/DL — HIGH (ref 70–99)
HCT VFR BLD CALC: 36.5 % — SIGNIFICANT CHANGE UP (ref 34.5–45)
HGB BLD-MCNC: 11.8 G/DL — SIGNIFICANT CHANGE UP (ref 11.5–15.5)
MAGNESIUM SERPL-MCNC: 2.3 MG/DL — SIGNIFICANT CHANGE UP (ref 1.6–2.6)
MCHC RBC-ENTMCNC: 29.1 PG — SIGNIFICANT CHANGE UP (ref 27–34)
MCHC RBC-ENTMCNC: 32.3 GM/DL — SIGNIFICANT CHANGE UP (ref 32–36)
MCV RBC AUTO: 90.1 FL — SIGNIFICANT CHANGE UP (ref 80–100)
NRBC # BLD: 0 /100 WBCS — SIGNIFICANT CHANGE UP
NRBC # FLD: 0 K/UL — SIGNIFICANT CHANGE UP
PHOSPHATE SERPL-MCNC: 3.7 MG/DL — SIGNIFICANT CHANGE UP (ref 2.5–4.5)
PLATELET # BLD AUTO: 366 K/UL — SIGNIFICANT CHANGE UP (ref 150–400)
POTASSIUM SERPL-MCNC: 4.1 MMOL/L — SIGNIFICANT CHANGE UP (ref 3.5–5.3)
POTASSIUM SERPL-SCNC: 4.1 MMOL/L — SIGNIFICANT CHANGE UP (ref 3.5–5.3)
RBC # BLD: 4.05 M/UL — SIGNIFICANT CHANGE UP (ref 3.8–5.2)
RBC # FLD: 12.8 % — SIGNIFICANT CHANGE UP (ref 10.3–14.5)
SODIUM SERPL-SCNC: 136 MMOL/L — SIGNIFICANT CHANGE UP (ref 135–145)
WBC # BLD: 9.97 K/UL — SIGNIFICANT CHANGE UP (ref 3.8–10.5)
WBC # FLD AUTO: 9.97 K/UL — SIGNIFICANT CHANGE UP (ref 3.8–10.5)

## 2021-04-14 PROCEDURE — 99232 SBSQ HOSP IP/OBS MODERATE 35: CPT

## 2021-04-14 PROCEDURE — 99233 SBSQ HOSP IP/OBS HIGH 50: CPT

## 2021-04-14 RX ORDER — POLYETHYLENE GLYCOL 3350 17 G/17G
17 POWDER, FOR SOLUTION ORAL DAILY
Refills: 0 | Status: DISCONTINUED | OUTPATIENT
Start: 2021-04-14 | End: 2021-04-16

## 2021-04-14 RX ORDER — SENNA PLUS 8.6 MG/1
2 TABLET ORAL AT BEDTIME
Refills: 0 | Status: DISCONTINUED | OUTPATIENT
Start: 2021-04-14 | End: 2021-04-16

## 2021-04-14 RX ADMIN — Medication 100 MILLIGRAM(S): at 06:40

## 2021-04-14 RX ADMIN — Medication 12.5 MILLIGRAM(S): at 06:40

## 2021-04-14 RX ADMIN — OXYCODONE HYDROCHLORIDE 10 MILLIGRAM(S): 5 TABLET ORAL at 11:31

## 2021-04-14 RX ADMIN — SENNA PLUS 2 TABLET(S): 8.6 TABLET ORAL at 21:53

## 2021-04-14 RX ADMIN — Medication 12.5 MILLIGRAM(S): at 17:59

## 2021-04-14 RX ADMIN — APIXABAN 5 MILLIGRAM(S): 2.5 TABLET, FILM COATED ORAL at 17:59

## 2021-04-14 RX ADMIN — FAMOTIDINE 20 MILLIGRAM(S): 10 INJECTION INTRAVENOUS at 17:59

## 2021-04-14 RX ADMIN — OXYCODONE HYDROCHLORIDE 10 MILLIGRAM(S): 5 TABLET ORAL at 17:59

## 2021-04-14 RX ADMIN — Medication 100 MILLIGRAM(S): at 14:00

## 2021-04-14 RX ADMIN — OXYCODONE HYDROCHLORIDE 10 MILLIGRAM(S): 5 TABLET ORAL at 10:31

## 2021-04-14 RX ADMIN — APIXABAN 5 MILLIGRAM(S): 2.5 TABLET, FILM COATED ORAL at 06:40

## 2021-04-14 RX ADMIN — OXYCODONE HYDROCHLORIDE 10 MILLIGRAM(S): 5 TABLET ORAL at 18:59

## 2021-04-14 RX ADMIN — Medication 100 MILLIGRAM(S): at 21:56

## 2021-04-14 RX ADMIN — PANTOPRAZOLE SODIUM 40 MILLIGRAM(S): 20 TABLET, DELAYED RELEASE ORAL at 06:40

## 2021-04-14 RX ADMIN — POLYETHYLENE GLYCOL 3350 17 GRAM(S): 17 POWDER, FOR SOLUTION ORAL at 09:44

## 2021-04-14 RX ADMIN — OXYCODONE HYDROCHLORIDE 10 MILLIGRAM(S): 5 TABLET ORAL at 04:50

## 2021-04-14 RX ADMIN — OXYCODONE HYDROCHLORIDE 10 MILLIGRAM(S): 5 TABLET ORAL at 04:12

## 2021-04-14 RX ADMIN — NYSTATIN CREAM 1 APPLICATION(S): 100000 CREAM TOPICAL at 17:59

## 2021-04-14 RX ADMIN — NYSTATIN CREAM 1 APPLICATION(S): 100000 CREAM TOPICAL at 06:41

## 2021-04-14 NOTE — DIETITIAN INITIAL EVALUATION ADULT. - ORAL INTAKE PTA/DIET HISTORY
Pt confirms NKFA, no noted micronutrient supplementation PTA per H&P. Pt with decreased PO intake and diarrhea x5 days PTA.

## 2021-04-14 NOTE — DIETITIAN INITIAL EVALUATION ADULT. - ENERGY INTAKE
Pt reports fair appetite/PO intake in house, states she is not hungry secondary to decreased ambulation. Pt requesting addition of yogurt with meals- will provide. Encouraged self completion of daily menu with personal preferences.

## 2021-04-14 NOTE — DIETITIAN INITIAL EVALUATION ADULT. - REASON FOR ADMISSION
Per chart, pt is 60 year old female PMHx Afib on Eliquis, varicose veins, gastric sleeve presenting with cellulitis after failing outpatient PO therapy.

## 2021-04-14 NOTE — DISCHARGE NOTE PROVIDER - NSFOLLOWUPCLINICS_GEN_ALL_ED_FT
[facility] - Infectious Disease  Infectious Disease  [address], [place]ious [place] [address]  Phone: [phone]  Fax:   Follow Up Time: 1 week

## 2021-04-14 NOTE — DIETITIAN INITIAL EVALUATION ADULT. - PHYSCIAL ASSESSMENT
No pressure injuries noted at this time.  Pt with no overt signs of muscle wasting/fat loss upon visualization.

## 2021-04-14 NOTE — DIETITIAN INITIAL EVALUATION ADULT. - ADD RECOMMEND
2) Obtained food preferences, will provide as able. Pt made aware RDN remains available PRN.                                                                                          3) Monitor PO intake, weight trends, nutrition related lab values, BMs/GI distress, hydration status, skin integrity.

## 2021-04-14 NOTE — DIETITIAN INITIAL EVALUATION ADULT. - OTHER INFO
Pt denies current GI distress, last BM 4/13 per chart. Dosing weight (4/7) 290.0 lbs, no history available via chart.

## 2021-04-14 NOTE — DISCHARGE NOTE PROVIDER - NSDCCPCAREPLAN_GEN_ALL_CORE_FT
PRINCIPAL DISCHARGE DIAGNOSIS  Diagnosis: Cellulitis of left lower extremity  Assessment and Plan of Treatment: You were admitted to the hospital with cellulitis, or a skin infection, on your left leg. You were initially treated with an IV antibiotic called vancyomycin, however as your leg continued to appear infected, the Infectious Disease team was consulted. Per the Infectious Disease team's recommendation you were transitioned from vancomycin to another IV antibiotic called Ancef and your cellulitis improved. Please take your new antibiotic (Keflex) in addition to your other home medications as prescribed and make an appointment to follow up with your primary care doctor.       PRINCIPAL DISCHARGE DIAGNOSIS  Diagnosis: Cellulitis of left lower extremity  Assessment and Plan of Treatment: You were admitted to the hospital with cellulitis, or a skin infection, on your left leg. You were initially treated with an IV antibiotic called vancyomycin, however as your leg continued to appear infected, the Infectious Disease team was consulted. Per the Infectious Disease team's recommendation you were transitioned from vancomycin to another IV antibiotic called Ancef and your cellulitis improved. Please take your new antibiotic (Keflex) in addition to your other home medications as prescribed and make an appointment to follow up with your primary care doctor.      SECONDARY DISCHARGE DIAGNOSES  Diagnosis: Gastroesophageal reflux disease, unspecified whether esophagitis present  Assessment and Plan of Treatment: You have a history of GERD, or acid reflux. Please continue to take your home medications as prescribed and make an appointment to follow up with your primary care doctor.    Diagnosis: PAF (paroxysmal atrial fibrillation)  Assessment and Plan of Treatment: You have a history of atrial fibrillation, or an abnormal heart rhythm. Please continue to take your home medications as prescribed and make an appointment to follow up with your primary care doctor/cardiologist.

## 2021-04-14 NOTE — PROGRESS NOTE ADULT - PROBLEM SELECTOR PLAN 1
Pt w/ cellulitis to LL extremity, s/p failed trial of clindamycin, admitted for IV abx.  - C/w vancomycin pending clinical improvement of LLE cellulitis  - BCx: NGTD  - MRSA swab negative  - C/w Tylenol PRN for mild/moderate pain; oxycodone 10mg PO for severe pain  - CT w/wout contrast of LLE (4/12): +cellulitis; negative for abscess  - ID consulted given pt w/out improvement despite vanc at therapeutic level; appreciate recs -As per ID:  -- C/w cefazolin 2g IV q8h  - Wound care following; appreciate recs

## 2021-04-14 NOTE — DISCHARGE NOTE PROVIDER - NSDCMRMEDTOKEN_GEN_ALL_CORE_FT
Eliquis 5 mg oral tablet: 1 tab(s) orally 2 times a day, if larger than tic tac, crush and put in low fat yogurt or pudding.   famotidine 20 mg oral tablet: 1 tab(s) orally once a day  metoprolol tartrate 25 mg oral tablet: 0.5 tab(s) orally 2 times a day  omeprazole 40 mg oral delayed release capsule: 1 cap(s) orally once a day   Eliquis 5 mg oral tablet: 1 tab(s) orally 2 times a day, if larger than tic tac, crush and put in low fat yogurt or pudding.   famotidine 20 mg oral tablet: 1 tab(s) orally once a day  Keflex 500 mg oral capsule: 1 cap(s) orally every 6 hours MDD:2g  metoprolol tartrate 25 mg oral tablet: 0.5 tab(s) orally 2 times a day  omeprazole 40 mg oral delayed release capsule: 1 cap(s) orally once a day  Oxaydo 5 mg oral tablet: 1 tab(s) orally every 6 hours, As Needed -for severe pain MDD:20mg

## 2021-04-15 LAB
ANION GAP SERPL CALC-SCNC: 11 MMOL/L
BASOPHILS # BLD AUTO: 0.01 K/UL
BASOPHILS NFR BLD AUTO: 0.1 %
BUN SERPL-MCNC: 16 MG/DL
CALCIUM SERPL-MCNC: 9.5 MG/DL
CHLORIDE SERPL-SCNC: 103 MMOL/L
CO2 SERPL-SCNC: 26 MMOL/L
CREAT SERPL-MCNC: 0.67 MG/DL
EOSINOPHIL # BLD AUTO: 0.13 K/UL
EOSINOPHIL NFR BLD AUTO: 1.9 %
GLUCOSE SERPL-MCNC: 90 MG/DL
HCT VFR BLD CALC: 42.2 %
HGB BLD-MCNC: 13.1 G/DL
IMM GRANULOCYTES NFR BLD AUTO: 0.3 %
LYMPHOCYTES # BLD AUTO: 1.67 K/UL
LYMPHOCYTES NFR BLD AUTO: 24.2 %
MAN DIFF?: NORMAL
MCHC RBC-ENTMCNC: 29 PG
MCHC RBC-ENTMCNC: 31 GM/DL
MCV RBC AUTO: 93.6 FL
MONOCYTES # BLD AUTO: 0.49 K/UL
MONOCYTES NFR BLD AUTO: 7.1 %
NEUTROPHILS # BLD AUTO: 4.57 K/UL
NEUTROPHILS NFR BLD AUTO: 66.4 %
PLATELET # BLD AUTO: 222 K/UL
POTASSIUM SERPL-SCNC: 4.7 MMOL/L
RBC # BLD: 4.51 M/UL
RBC # FLD: 13.2 %
SODIUM SERPL-SCNC: 140 MMOL/L
WBC # FLD AUTO: 6.89 K/UL

## 2021-04-15 PROCEDURE — 99233 SBSQ HOSP IP/OBS HIGH 50: CPT

## 2021-04-15 PROCEDURE — 99232 SBSQ HOSP IP/OBS MODERATE 35: CPT

## 2021-04-15 RX ORDER — DIPHENHYDRAMINE HCL 50 MG
25 CAPSULE ORAL ONCE
Refills: 0 | Status: COMPLETED | OUTPATIENT
Start: 2021-04-15 | End: 2021-04-15

## 2021-04-15 RX ADMIN — POLYETHYLENE GLYCOL 3350 17 GRAM(S): 17 POWDER, FOR SOLUTION ORAL at 13:58

## 2021-04-15 RX ADMIN — APIXABAN 5 MILLIGRAM(S): 2.5 TABLET, FILM COATED ORAL at 06:43

## 2021-04-15 RX ADMIN — FAMOTIDINE 20 MILLIGRAM(S): 10 INJECTION INTRAVENOUS at 18:16

## 2021-04-15 RX ADMIN — PANTOPRAZOLE SODIUM 40 MILLIGRAM(S): 20 TABLET, DELAYED RELEASE ORAL at 06:44

## 2021-04-15 RX ADMIN — NYSTATIN CREAM 1 APPLICATION(S): 100000 CREAM TOPICAL at 06:44

## 2021-04-15 RX ADMIN — SENNA PLUS 2 TABLET(S): 8.6 TABLET ORAL at 21:31

## 2021-04-15 RX ADMIN — OXYCODONE HYDROCHLORIDE 10 MILLIGRAM(S): 5 TABLET ORAL at 10:17

## 2021-04-15 RX ADMIN — Medication 12.5 MILLIGRAM(S): at 06:43

## 2021-04-15 RX ADMIN — Medication 100 MILLIGRAM(S): at 21:31

## 2021-04-15 RX ADMIN — Medication 12.5 MILLIGRAM(S): at 18:16

## 2021-04-15 RX ADMIN — Medication 100 MILLIGRAM(S): at 13:58

## 2021-04-15 RX ADMIN — OXYCODONE HYDROCHLORIDE 10 MILLIGRAM(S): 5 TABLET ORAL at 04:31

## 2021-04-15 RX ADMIN — Medication 25 MILLIGRAM(S): at 22:28

## 2021-04-15 RX ADMIN — OXYCODONE HYDROCHLORIDE 10 MILLIGRAM(S): 5 TABLET ORAL at 18:15

## 2021-04-15 RX ADMIN — Medication 100 MILLIGRAM(S): at 06:43

## 2021-04-15 RX ADMIN — OXYCODONE HYDROCHLORIDE 10 MILLIGRAM(S): 5 TABLET ORAL at 19:03

## 2021-04-15 RX ADMIN — NYSTATIN CREAM 1 APPLICATION(S): 100000 CREAM TOPICAL at 18:16

## 2021-04-15 RX ADMIN — OXYCODONE HYDROCHLORIDE 10 MILLIGRAM(S): 5 TABLET ORAL at 03:31

## 2021-04-15 RX ADMIN — APIXABAN 5 MILLIGRAM(S): 2.5 TABLET, FILM COATED ORAL at 18:15

## 2021-04-15 RX ADMIN — OXYCODONE HYDROCHLORIDE 10 MILLIGRAM(S): 5 TABLET ORAL at 11:15

## 2021-04-15 NOTE — PHYSICAL THERAPY INITIAL EVALUATION ADULT - ADDITIONAL COMMENTS
Pt lives with her  and children in private home, there are 4 steps to enter + flight to bedroom/bathroom. Pt was independent in all ADL prior to admission. 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

## 2021-04-15 NOTE — PHYSICAL THERAPY INITIAL EVALUATION ADULT - PERTINENT HX OF CURRENT PROBLEM, REHAB EVAL
61yo F w/ h/o paroxysmal Afib (on Eliquis), PAD, gastric sleeve p/w cellulitis after failing outpatient PO therapy. Per pt had intermittent fevers for 5d prior to admission, w/ Tmax 104. Two days after onset of fever she also noted worsening L leg swelling a/w erythema, pain, and warmth.

## 2021-04-15 NOTE — PROGRESS NOTE ADULT - PROBLEM SELECTOR PLAN 1
Pt w/ cellulitis to LL extremity, s/p failed trial of clindamycin, admitted for IV abx.  - C/w vancomycin pending clinical improvement of LLE cellulitis  - BCx: NGTD  - MRSA swab negative  - C/w Tylenol PRN for mild/moderate pain; oxycodone 10mg PO for severe pain  - CT w/wout contrast of LLE (4/12): +cellulitis; negative for abscess  - ID consulted given pt w/out improvement despite vanc at therapeutic level; appreciate recs -As per ID:  -- C/w cefazolin 2g IV q8h  -- Hope to transition to PO keflex in ~24h  - Wound care following; appreciate recs

## 2021-04-16 ENCOUNTER — TRANSCRIPTION ENCOUNTER (OUTPATIENT)
Age: 61
End: 2021-04-16

## 2021-04-16 VITALS
DIASTOLIC BLOOD PRESSURE: 62 MMHG | RESPIRATION RATE: 17 BRPM | OXYGEN SATURATION: 99 % | HEART RATE: 66 BPM | SYSTOLIC BLOOD PRESSURE: 119 MMHG | TEMPERATURE: 98 F

## 2021-04-16 PROCEDURE — 99233 SBSQ HOSP IP/OBS HIGH 50: CPT

## 2021-04-16 PROCEDURE — 99232 SBSQ HOSP IP/OBS MODERATE 35: CPT

## 2021-04-16 RX ORDER — OXYCODONE HYDROCHLORIDE 5 MG/1
1 TABLET ORAL
Qty: 20 | Refills: 0
Start: 2021-04-16 | End: 2021-04-20

## 2021-04-16 RX ORDER — CEPHALEXIN 500 MG
1 CAPSULE ORAL
Qty: 24 | Refills: 0
Start: 2021-04-16 | End: 2021-04-21

## 2021-04-16 RX ADMIN — APIXABAN 5 MILLIGRAM(S): 2.5 TABLET, FILM COATED ORAL at 06:52

## 2021-04-16 RX ADMIN — Medication 12.5 MILLIGRAM(S): at 06:52

## 2021-04-16 RX ADMIN — Medication 100 MILLIGRAM(S): at 06:52

## 2021-04-16 RX ADMIN — OXYCODONE HYDROCHLORIDE 10 MILLIGRAM(S): 5 TABLET ORAL at 16:05

## 2021-04-16 RX ADMIN — PANTOPRAZOLE SODIUM 40 MILLIGRAM(S): 20 TABLET, DELAYED RELEASE ORAL at 06:52

## 2021-04-16 RX ADMIN — Medication 100 MILLIGRAM(S): at 13:30

## 2021-04-16 RX ADMIN — OXYCODONE HYDROCHLORIDE 10 MILLIGRAM(S): 5 TABLET ORAL at 09:40

## 2021-04-16 RX ADMIN — OXYCODONE HYDROCHLORIDE 10 MILLIGRAM(S): 5 TABLET ORAL at 08:41

## 2021-04-16 RX ADMIN — OXYCODONE HYDROCHLORIDE 10 MILLIGRAM(S): 5 TABLET ORAL at 15:07

## 2021-04-16 NOTE — PROGRESS NOTE ADULT - PROBLEM SELECTOR PLAN 2
- C/w home Eliquis and metoprolol

## 2021-04-16 NOTE — PROGRESS NOTE ADULT - PROBLEM SELECTOR PROBLEM 1
Cellulitis of left lower extremity

## 2021-04-16 NOTE — PROGRESS NOTE ADULT - PROBLEM SELECTOR PLAN 1
Pt w/ cellulitis to LL extremity, s/p failed trial of clindamycin, admitted for IV abx.  - C/w vancomycin pending clinical improvement of LLE cellulitis  - BCx: NGTD  - MRSA swab negative  - C/w Tylenol PRN for mild/moderate pain; oxycodone 10mg PO for severe pain  - CT w/wout contrast of LLE (4/12): +cellulitis; negative for abscess  - ID consulted given pt w/out improvement despite vanc at therapeutic level; appreciate recs -As per ID:  -- Transition to PO keflex in ~24h  - Wound care following; appreciate recs

## 2021-04-16 NOTE — PROGRESS NOTE ADULT - EXTREMITIES COMMENTS
left LE cellulitis - less red, less swelling, no purulence
left leg redness better, swelling less, no purulence
left LE redness less, improved swelling, still warm, no purlence

## 2021-04-16 NOTE — PROGRESS NOTE ADULT - PROBLEM SELECTOR PLAN 3
- C/w home omeprazole 40 and pepcid 40
- F/u repeat lipid panel (cholesterol in 1/2020 = 222)  - Consider initiating statin therapy
- C/w home omeprazole 40 and pepcid 40

## 2021-04-16 NOTE — PROGRESS NOTE ADULT - REASON FOR ADMISSION
Leg Swelling

## 2021-04-16 NOTE — DISCHARGE NOTE NURSING/CASE MANAGEMENT/SOCIAL WORK - PATIENT PORTAL LINK FT
You can access the FollowMyHealth Patient Portal offered by [facility] by registering at the following website: [email or web address]. By joining [facility] FollowMyHealth portal, you will also be able to view your health information using other applications (apps) compatible with our system.

## 2021-04-16 NOTE — PROGRESS NOTE ADULT - PROBLEM SELECTOR PROBLEM 4
Prophylactic measure
Gastroesophageal reflux disease, unspecified whether esophagitis present
Prophylactic measure
Prophylactic measure

## 2021-04-16 NOTE — PROGRESS NOTE ADULT - NEUROLOGICAL DETAILS
alert and oriented x 3/responds to verbal commands
alert and oriented x 3/responds to verbal commands
alert and oriented x 3/responds to verbal commands/normal strength

## 2021-04-16 NOTE — PROGRESS NOTE ADULT - SUBJECTIVE AND OBJECTIVE BOX
Alayna Valderrama MD  Internal Medicine PGY-1  Pager# [phone]; [facility] Pager# [phone]    PATIENT: ASHANTI FLOWER, MRN: [id number]    CHIEF COMPLAINT: Patient is a 60y old  Female who presents with a chief complaint of Leg Swelling (12 Apr 2021 06:31)    INTERVAL HISTORY/OVERNIGHT EVENTS: No overnight events. At bedside, patient has been sleeping and eating well. Denies N/V/D/C. Last BM x1 regular yesterday. Denies abdominal pain. Denies chest pain or SOB, cough. Has been ambulating with assistance. Oriented to person, place, and time. Breathing comfortably on room air.    REVIEW OF SYSTEMS:  [X] All other systems negative  [ ] Unable to assess ROS because ________.    MEDICATIONS:  MEDICATIONS  (STANDING):  apixaban 5 milliGRAM(s) Oral two times a day  famotidine    Tablet 20 milliGRAM(s) Oral daily  metoprolol tartrate 12.5 milliGRAM(s) Oral two times a day  pantoprazole    Tablet 40 milliGRAM(s) Oral before breakfast  vancomycin  IVPB 1500 milliGRAM(s) IV Intermittent every 12 hours    MEDICATIONS  (PRN):  AQUAPHOR (petrolatum Ointment) 1 Application(s) Topical two times a day PRN dry skin over cellulitic region  oxyCODONE    IR 10 milliGRAM(s) Oral every 6 hours PRN Severe Pain (7 - 10)      ALLERGIES: Allergies    penicillins (Unknown)    Intolerances        OBJECTIVE:    CAPILLARY BLOOD GLUCOSE  CAPILLARY BLOOD GLUCOSE        I&O's Summary    Daily     Daily     Vital Signs Last 24 Hrs  T(C): 36.8 (13 Apr 2021 06:45), Max: 36.8 (13 Apr 2021 06:45)  T(F): 98.3 (13 Apr 2021 06:45), Max: 98.3 (13 Apr 2021 06:45)  HR: 58 (13 Apr 2021 06:45) (58 - 70)  BP: 108/65 (13 Apr 2021 06:45) (108/65 - 125/61)  BP(mean): --  RR: 17 (13 Apr 2021 06:45) (17 - 18)  SpO2: 100% (13 Apr 2021 06:45) (98% - 100%)    PHYSICAL EXAMINATION:  General: Comfortable, no acute distress, cooperative with exam.  HEENT: PERRLA, EOMI, moist mucous membranes.  Respiratory: CTAB, normal respiratory effort, no coughing, wheezes, crackles, or rales.  CV: RRR, S1S2, no murmurs, rubs or gallops. No JVD. Distal pulses intact.  Abdominal: Soft, nontender, nondistended, no rebound or guarding, normal bowel sounds.  Neurology: AOx3, no focal neuro defects, SHERWOOD x 4.  Extremities: No pitting edema, + Peripheral pulses. +cellulitis to LLE  Incisions:   Tubes:    LABS:                          11.1   7.15  )-----------( 332      ( 11 Apr 2021 07:05 )             35.4     04-12    132<L>  |  100  |  8   ----------------------------<  85  5.2   |  20<L>  |  0.67    Ca    9.2      12 Apr 2021 07:17  Phos  3.8     04-12  Mg     2.3     04-12    TPro  7.4  /  Alb  3.0<L>  /  TBili  0.4  /  DBili  x   /  AST  41<H>  /  ALT  32  /  AlkPhos  109  04-12    LIVER FUNCTIONS - ( 12 Apr 2021 07:17 )  Alb: 3.0 g/dL / Pro: 7.4 g/dL / ALK PHOS: 109 U/L / ALT: 32 U/L / AST: 41 U/L / GGT: x             TELEMETRY: N/A    EKG: N/A    IMAGING: No new images or tests  
Alayna Valderrama MD  Internal Medicine PGY-1  Pager# [phone]; [facility] Pager# [phone]    PATIENT: ASHANTI FLOWER, MRN: [id number]    CHIEF COMPLAINT: Patient is a 60y old  Female who presents with a chief complaint of Leg Swelling (15 Apr 2021 11:35)    INTERVAL HISTORY/OVERNIGHT EVENTS: No overnight events. At bedside, patient has been sleeping and eating well. Denies N/V/D/C. Last BM x1 regular yesterday. Denies abdominal pain. Denies chest pain or SOB, cough. Has been ambulating with assistance. Oriented to person, place, and time. Breathing comfortably on room air.    REVIEW OF SYSTEMS:  [X] All other systems negative  [ ] Unable to assess ROS because ________.    MEDICATIONS:  MEDICATIONS  (STANDING):  apixaban 5 milliGRAM(s) Oral two times a day  ceFAZolin   IVPB 2000 milliGRAM(s) IV Intermittent every 8 hours  famotidine    Tablet 20 milliGRAM(s) Oral daily  metoprolol tartrate 12.5 milliGRAM(s) Oral two times a day  nystatin Powder 1 Application(s) Topical two times a day  pantoprazole    Tablet 40 milliGRAM(s) Oral before breakfast  polyethylene glycol 3350 17 Gram(s) Oral daily  senna 2 Tablet(s) Oral at bedtime    MEDICATIONS  (PRN):  AQUAPHOR (petrolatum Ointment) 1 Application(s) Topical two times a day PRN dry skin over cellulitic region  oxyCODONE    IR 10 milliGRAM(s) Oral every 6 hours PRN Severe Pain (7 - 10)      ALLERGIES: Allergies    penicillins (Unknown)    Intolerances        OBJECTIVE:    CAPILLARY BLOOD GLUCOSE  CAPILLARY BLOOD GLUCOSE        I&O's Summary    Daily     Daily     Vital Signs Last 24 Hrs  T(C): 36.6 (15 Apr 2021 21:25), Max: 36.6 (15 Apr 2021 06:45)  T(F): 97.9 (15 Apr 2021 21:25), Max: 97.9 (15 Apr 2021 06:45)  HR: 64 (15 Apr 2021 21:25) (60 - 70)  BP: 119/60 (15 Apr 2021 21:25) (108/55 - 129/64)  BP(mean): --  RR: 18 (15 Apr 2021 21:25) (18 - 18)  SpO2: 99% (15 Apr 2021 21:25) (98% - 100%)    PHYSICAL EXAMINATION:  General: Comfortable, no acute distress, cooperative with exam.  HEENT: PERRLA, EOMI, moist mucous membranes.  Respiratory: CTAB, normal respiratory effort, no coughing, wheezes, crackles, or rales.  CV: RRR, S1S2, no murmurs, rubs or gallops. No JVD. Distal pulses intact.  Abdominal: Soft, nontender, nondistended, no rebound or guarding, normal bowel sounds.  Neurology: AOx3, no focal neuro defects, SHERWOOD x 4.  Extremities: No pitting edema, + Peripheral pulses. +LLE cellulitis  Incisions:   Tubes:    LABS:                          11.8   9.97  )-----------( 366      ( 14 Apr 2021 09:57 )             36.5     04-14    136  |  101  |  9   ----------------------------<  101<H>  4.1   |  27  |  0.75    Ca    9.4      14 Apr 2021 09:57  Phos  3.7     04-14  Mg     2.3     04-14      TELEMETRY: N/A    EKG: N/A    IMAGING: No new images or tests  
ASHANTI FLOWER 60y [id number]    Patient is a 60y old  Female who presents with a chief complaint of Per chart, pt is 60 year old female PMHx Afib on Eliquis, varicose veins, gastric sleeve presenting with cellulitis after failing outpatient PO therapy. (14 Apr 2021 14:37)      Follow Up/CC:  ID following for leg cellulitis    Interval History/ROS: no fever, leg better, no issues with cefazolin     Allergies    penicillins (Unknown)    Intolerances        ANTIMICROBIALS:  ceFAZolin   IVPB 2000 every 8 hours      MEDICATIONS  (STANDING):  apixaban 5 milliGRAM(s) Oral two times a day  ceFAZolin   IVPB 2000 milliGRAM(s) IV Intermittent every 8 hours  famotidine    Tablet 20 milliGRAM(s) Oral daily  metoprolol tartrate 12.5 milliGRAM(s) Oral two times a day  nystatin Powder 1 Application(s) Topical two times a day  pantoprazole    Tablet 40 milliGRAM(s) Oral before breakfast  polyethylene glycol 3350 17 Gram(s) Oral daily  senna 2 Tablet(s) Oral at bedtime    MEDICATIONS  (PRN):  AQUAPHOR (petrolatum Ointment) 1 Application(s) Topical two times a day PRN dry skin over cellulitic region  oxyCODONE    IR 10 milliGRAM(s) Oral every 6 hours PRN Severe Pain (7 - 10)        Vital Signs Last 24 Hrs  T(C): 37 (14 Apr 2021 15:29), Max: 37 (14 Apr 2021 15:29)  T(F): 98.6 (14 Apr 2021 15:29), Max: 98.6 (14 Apr 2021 15:29)  HR: 68 (14 Apr 2021 15:29) (63 - 68)  BP: 108/68 (14 Apr 2021 15:29) (102/65 - 123/72)  BP(mean): --  RR: 18 (14 Apr 2021 15:29) (16 - 18)  SpO2: 100% (14 Apr 2021 15:29) (98% - 100%)    CBC Full  -  ( 14 Apr 2021 09:57 )  WBC Count : 9.97 K/uL  RBC Count : 4.05 M/uL  Hemoglobin : 11.8 g/dL  Hematocrit : 36.5 %  Platelet Count - Automated : 366 K/uL  Mean Cell Volume : 90.1 fL  Mean Cell Hemoglobin : 29.1 pg  Mean Cell Hemoglobin Concentration : 32.3 gm/dL  Auto Neutrophil # : x  Auto Lymphocyte # : x  Auto Monocyte # : x  Auto Eosinophil # : x  Auto Basophil # : x  Auto Neutrophil % : x  Auto Lymphocyte % : x  Auto Monocyte % : x  Auto Eosinophil % : x  Auto Basophil % : x    04-14    136  |  101  |  9   ----------------------------<  101<H>  4.1   |  27  |  0.75    Ca    9.4      14 Apr 2021 09:57  Phos  3.7     04-14  Mg     2.3     04-14    TPro  7.2  /  Alb  3.4  /  TBili  0.4  /  DBili  x   /  AST  24  /  ALT  32  /  AlkPhos  105  04-13    LIVER FUNCTIONS - ( 13 Apr 2021 07:48 )  Alb: 3.4 g/dL / Pro: 7.2 g/dL / ALK PHOS: 105 U/L / ALT: 32 U/L / AST: 24 U/L / GGT: x               MICROBIOLOGY:  .Nose Nose  04-07-21   No MRSA isolated  No Staph Aureus (MSSA) isolated "This can represent normal nasal  carriage.  PCR is more sensitive for identifying MRSA/MSSA carriage"  --  --      .Blood Blood-Peripheral  04-07-21   No Growth Final  --  --      .Blood Blood-Peripheral  04-07-21   No Growth Final  --  --      RADIOLOGY    < from: CT Lower Extremity w/wo IV Cont, Left (04.12.21 @ 14:45) >  IMPRESSION: Diffuse cellulitic change, as above. No abscess or CT evidence of osteomyelitis    < end of copied text >  
Alayna Valderrama MD  Internal Medicine PGY-1  Pager# [phone]; [facility] Pager# [phone]    PATIENT: ASHANTI FLOWER, MRN: [id number]    CHIEF COMPLAINT: Patient is a 60y old  Female who presents with a chief complaint of Leg Swelling (10 Apr 2021 07:43)    INTERVAL HISTORY/OVERNIGHT EVENTS: No overnight events. At bedside, patient has been sleeping and eating well. Denies N/V/D/C. Last BM x1 regular yesterday. Denies abdominal pain. Denies chest pain or SOB, cough. Has been ambulating with assistance. Oriented to person, place, and time. Breathing comfortably on room air.    REVIEW OF SYSTEMS:  [X] All other systems negative  [ ] Unable to assess ROS because ________.    MEDICATIONS:  MEDICATIONS  (STANDING):  apixaban 5 milliGRAM(s) Oral two times a day  famotidine    Tablet 20 milliGRAM(s) Oral daily  metoprolol tartrate 12.5 milliGRAM(s) Oral two times a day  pantoprazole    Tablet 40 milliGRAM(s) Oral before breakfast  vancomycin  IVPB 1500 milliGRAM(s) IV Intermittent every 12 hours    MEDICATIONS  (PRN):  oxyCODONE    IR 10 milliGRAM(s) Oral every 6 hours PRN Severe Pain (7 - 10)      ALLERGIES: Allergies    penicillins (Unknown)    Intolerances        OBJECTIVE:    CAPILLARY BLOOD GLUCOSE  CAPILLARY BLOOD GLUCOSE        I&O's Summary    Daily     Daily     Vital Signs Last 24 Hrs  T(C): 37.2 (11 Apr 2021 05:48), Max: 37.2 (11 Apr 2021 05:48)  T(F): 98.9 (11 Apr 2021 05:48), Max: 98.9 (11 Apr 2021 05:48)  HR: 84 (11 Apr 2021 05:48) (65 - 84)  BP: 115/61 (11 Apr 2021 05:48) (114/60 - 143/86)  BP(mean): --  RR: 18 (11 Apr 2021 05:48) (18 - 18)  SpO2: 98% (11 Apr 2021 05:48) (98% - 100%)    PHYSICAL EXAMINATION:  General: Comfortable, no acute distress, cooperative with exam.  HEENT: PERRLA, EOMI, moist mucous membranes.  Respiratory: CTAB, normal respiratory effort, no coughing, wheezes, crackles, or rales.  CV: RRR, S1S2, no murmurs, rubs or gallops. No JVD. Distal pulses intact.  Abdominal: Soft, nontender, nondistended, no rebound or guarding, normal bowel sounds.  Neurology: AOx3, no focal neuro defects, SHERWOOD x 4.  Extremities: No pitting edema, + Peripheral pulses.  Incisions:   Tubes:    LABS:                          11.8   8.25  )-----------( 350      ( 10 Apr 2021 07:33 )             36.0     04-10    138  |  104  |  8   ----------------------------<  88  4.1   |  24  |  0.61    Ca    9.4      10 Apr 2021 07:33  Phos  3.7     04-10  Mg     2.1     04-10      TELEMETRY: N/A    EKG: N/A    IMAGING: No new images or tests  
Alayna Valderrama MD  Internal Medicine PGY-1  Pager# [phone]; [facility] Pager# [phone]    PATIENT: ASHANTI FLOWER, MRN: [id number]    CHIEF COMPLAINT: Patient is a 60y old  Female who presents with a chief complaint of Per chart, pt is 60 year old female PMHx Afib on Eliquis, varicose veins, gastric sleeve presenting with cellulitis after failing outpatient PO therapy. (14 Apr 2021 14:37)    INTERVAL HISTORY/OVERNIGHT EVENTS: No overnight events. At bedside, patient has been sleeping and eating well. Denies N/V/D/C. Last BM x1 regular yesterday. Denies abdominal pain. Denies chest pain or SOB, cough. Has been ambulating with assistance. Oriented to person, place, and time. Breathing comfortably on room air.    REVIEW OF SYSTEMS:  [X] All other systems negative  [ ] Unable to assess ROS because ________.    MEDICATIONS:  MEDICATIONS  (STANDING):  apixaban 5 milliGRAM(s) Oral two times a day  ceFAZolin   IVPB 2000 milliGRAM(s) IV Intermittent every 8 hours  famotidine    Tablet 20 milliGRAM(s) Oral daily  metoprolol tartrate 12.5 milliGRAM(s) Oral two times a day  nystatin Powder 1 Application(s) Topical two times a day  pantoprazole    Tablet 40 milliGRAM(s) Oral before breakfast  polyethylene glycol 3350 17 Gram(s) Oral daily  senna 2 Tablet(s) Oral at bedtime    MEDICATIONS  (PRN):  AQUAPHOR (petrolatum Ointment) 1 Application(s) Topical two times a day PRN dry skin over cellulitic region  oxyCODONE    IR 10 milliGRAM(s) Oral every 6 hours PRN Severe Pain (7 - 10)      ALLERGIES: Allergies    penicillins (Unknown)    Intolerances        OBJECTIVE:    CAPILLARY BLOOD GLUCOSE  CAPILLARY BLOOD GLUCOSE        I&O's Summary    Daily     Daily     Vital Signs Last 24 Hrs  T(C): 37 (14 Apr 2021 20:49), Max: 37 (14 Apr 2021 15:29)  T(F): 98.6 (14 Apr 2021 20:49), Max: 98.6 (14 Apr 2021 15:29)  HR: 65 (14 Apr 2021 20:49) (65 - 69)  BP: 111/58 (14 Apr 2021 20:49) (108/68 - 130/50)  BP(mean): --  RR: 17 (14 Apr 2021 20:49) (17 - 18)  SpO2: 98% (14 Apr 2021 20:49) (98% - 100%)    PHYSICAL EXAMINATION:  General: Comfortable, no acute distress, cooperative with exam.  HEENT: PERRLA, EOMI, moist mucous membranes.  Respiratory: CTAB, normal respiratory effort, no coughing, wheezes, crackles, or rales.  CV: RRR, S1S2, no murmurs, rubs or gallops. No JVD. Distal pulses intact.  Abdominal: Soft, nontender, nondistended, no rebound or guarding, normal bowel sounds.  Neurology: AOx3, no focal neuro defects, SHERWOOD x 4.  Extremities: No pitting edema, + Peripheral pulses. +LLE cellulitits  Incisions:   Tubes:    LABS:                          11.8   9.97  )-----------( 366      ( 14 Apr 2021 09:57 )             36.5     04-14    136  |  101  |  9   ----------------------------<  101<H>  4.1   |  27  |  0.75    Ca    9.4      14 Apr 2021 09:57  Phos  3.7     04-14  Mg     2.3     04-14    TPro  7.2  /  Alb  3.4  /  TBili  0.4  /  DBili  x   /  AST  24  /  ALT  32  /  AlkPhos  105  04-13    LIVER FUNCTIONS - ( 13 Apr 2021 07:48 )  Alb: 3.4 g/dL / Pro: 7.2 g/dL / ALK PHOS: 105 U/L / ALT: 32 U/L / AST: 24 U/L / GGT: x               TELEMETRY: N/A    EKG: N/A    IMAGING: No new images or tests  
Alayna Valderrama MD  Internal Medicine PGY-1  Pager# [phone]; [facility] Pager# [phone]    PATIENT: ASHANTI FLOWER, MRN: [id number]    CHIEF COMPLAINT: Patient is a 60y old  Female who presents with a chief complaint of Leg Swelling (08 Apr 2021 06:45)    INTERVAL HISTORY/OVERNIGHT EVENTS: O/N still complaining of L leg pain requiring IV tylenol x2 (17:00, 05:00) and ketoralac 30 (22:00). At bedside, patient has been sleeping and eating well. Denies N/V/D/C. Last BM x1 regular yesterday. Denies abdominal pain. Denies chest pain or SOB, cough. Has been ambulating with assistance. Oriented to person, place, and time. Breathing comfortably on room air.    REVIEW OF SYSTEMS:  [X] All other systems negative  [ ] Unable to assess ROS because ________.    MEDICATIONS:  MEDICATIONS  (STANDING):  apixaban 5 milliGRAM(s) Oral two times a day  famotidine    Tablet 20 milliGRAM(s) Oral daily  metoprolol tartrate 12.5 milliGRAM(s) Oral two times a day  pantoprazole    Tablet 40 milliGRAM(s) Oral before breakfast  vancomycin  IVPB 1500 milliGRAM(s) IV Intermittent every 12 hours    MEDICATIONS  (PRN):  oxyCODONE    IR 5 milliGRAM(s) Oral every 6 hours PRN Severe Pain (7 - 10)      ALLERGIES: Allergies    penicillins (Unknown)    Intolerances        OBJECTIVE:    CAPILLARY BLOOD GLUCOSE        CAPILLARY BLOOD GLUCOSE        I&O's Summary    Daily     Daily     Vital Signs Last 24 Hrs  T(C): 36.6 (09 Apr 2021 05:15), Max: 36.6 (09 Apr 2021 05:15)  T(F): 97.9 (09 Apr 2021 05:15), Max: 97.9 (09 Apr 2021 05:15)  HR: 66 (09 Apr 2021 05:15) (65 - 67)  BP: 132/75 (09 Apr 2021 05:15) (115/53 - 132/75)  BP(mean): --  RR: 18 (09 Apr 2021 05:15) (17 - 18)  SpO2: 100% (09 Apr 2021 05:15) (100% - 100%)    PHYSICAL EXAMINATION:  General: Comfortable, no acute distress, cooperative with exam.  HEENT: PERRLA, EOMI, moist mucous membranes.  Respiratory: CTAB, normal respiratory effort, no coughing, wheezes, crackles, or rales.  CV: RRR, S1S2, no murmurs, rubs or gallops. No JVD. Distal pulses intact.  Abdominal: Soft, nontender, nondistended, no rebound or guarding, normal bowel sounds.  Neurology: AOx3, no focal neuro defects, SHERWOOD x 4.  Extremities: No pitting edema, + Peripheral pulses.  Incisions:   Tubes:    LABS:                          11.8   10.36 )-----------( 283      ( 08 Apr 2021 07:18 )             36.1     04-08    136  |  101  |  9   ----------------------------<  94  3.8   |  23  |  0.67    Ca    9.1      08 Apr 2021 07:18  Phos  3.3     04-08  Mg     2.1     04-08    TPro  7.2  /  Alb  3.8  /  TBili  0.6  /  DBili  x   /  AST  29  /  ALT  35<H>  /  AlkPhos  109  04-08    LIVER FUNCTIONS - ( 08 Apr 2021 07:18 )  Alb: 3.8 g/dL / Pro: 7.2 g/dL / ALK PHOS: 109 U/L / ALT: 35 U/L / AST: 29 U/L / GGT: x           PT/INR - ( 07 Apr 2021 14:30 )   PT: 17.8 sec;   INR: 1.60 ratio         PTT - ( 07 Apr 2021 14:30 )  PTT:36.7 sec            TELEMETRY: N/A    EKG: N/A    IMAGING: No new images or tests  
Smith Baker MD  PGY 3 Department of Internal Medicine  Pager: [phone]/ [phone]    Patient is a 60y old  Female who presents with a chief complaint of Leg Swelling (09 Apr 2021 06:58)      SUBJECTIVE / OVERNIGHT EVENTS: Pt seen and examined. No acute overnight events. Pt reported no fever, chills, CP, SOB, abdominal pain, N/V/D, dysuria, or new joint aches.     MEDICATIONS  (STANDING):  apixaban 5 milliGRAM(s) Oral two times a day  famotidine    Tablet 20 milliGRAM(s) Oral daily  metoprolol tartrate 12.5 milliGRAM(s) Oral two times a day  pantoprazole    Tablet 40 milliGRAM(s) Oral before breakfast  vancomycin  IVPB 1500 milliGRAM(s) IV Intermittent every 12 hours    MEDICATIONS  (PRN):  oxyCODONE    IR 10 milliGRAM(s) Oral every 6 hours PRN Severe Pain (7 - 10)      I&O's Summary      Vital Signs Last 24 Hrs  T(C): 36.8 (10 Apr 2021 06:26), Max: 36.8 (10 Apr 2021 06:26)  T(F): 98.3 (10 Apr 2021 06:26), Max: 98.3 (10 Apr 2021 06:26)  HR: 65 (10 Apr 2021 06:26) (65 - 77)  BP: 124/64 (10 Apr 2021 06:26) (118/51 - 128/75)  BP(mean): --  RR: 18 (10 Apr 2021 06:26) (17 - 18)  SpO2: 96% (10 Apr 2021 06:26) (96% - 99%)    CAPILLARY BLOOD GLUCOSE          PHYSICAL EXAM:  General: Comfortable, no acute distress, cooperative with exam.  HEENT: PERRLA, EOMI, moist mucous membranes.  Respiratory: CTAB, normal respiratory effort, no coughing, wheezes, crackles, or rales.  CV: RRR, S1S2, no murmurs, rubs or gallops. No JVD. Distal pulses intact.  Abdominal: Soft, nontender, nondistended, no rebound or guarding, normal bowel sounds.  Neurology: AOx3, no focal neuro defects, SHERWOOD x 4.  Extremities: No pitting edema, + Peripheral pulses.    LABS:                        10.9   7.75  )-----------( 259      ( 09 Apr 2021 07:59 )             34.2     Auto Eosinophil # 0.20  / Auto Eosinophil % 2.6   / Auto Neutrophil # 5.77  / Auto Neutrophil % 72.6  / BANDS % 1.8      04-09    136  |  103  |  11  ----------------------------<  100<H>  3.5   |  21<L>  |  0.61    Ca    9.1      09 Apr 2021 07:59  Mg     2.0     04-09  Phos  3.7     04-09                  RADIOLOGY & ADDITIONAL TESTS:    Imaging Personally Reviewed:    Consultant(s) Notes Reviewed:      Care Discussed with Consultants/Other Providers:  
ASHANTI FLOWER 60y [id number]    Patient is a 60y old  Female who presents with a chief complaint of Leg Swelling (16 Apr 2021 06:25)      Follow Up/CC:  ID following for cellulitis    Interval History/ROS: leg pain less, no fever    Allergies    penicillins (Unknown)    Intolerances        ANTIMICROBIALS:  ceFAZolin   IVPB 2000 every 8 hours      MEDICATIONS  (STANDING):  apixaban 5 milliGRAM(s) Oral two times a day  ceFAZolin   IVPB 2000 milliGRAM(s) IV Intermittent every 8 hours  famotidine    Tablet 20 milliGRAM(s) Oral daily  metoprolol tartrate 12.5 milliGRAM(s) Oral two times a day  nystatin Powder 1 Application(s) Topical two times a day  pantoprazole    Tablet 40 milliGRAM(s) Oral before breakfast  polyethylene glycol 3350 17 Gram(s) Oral daily  senna 2 Tablet(s) Oral at bedtime    MEDICATIONS  (PRN):  AQUAPHOR (petrolatum Ointment) 1 Application(s) Topical two times a day PRN dry skin over cellulitic region  oxyCODONE    IR 10 milliGRAM(s) Oral every 6 hours PRN Severe Pain (7 - 10)        Vital Signs Last 24 Hrs  T(C): 36.6 (15 Apr 2021 21:25), Max: 36.6 (15 Apr 2021 21:25)  T(F): 97.9 (15 Apr 2021 21:25), Max: 97.9 (15 Apr 2021 21:25)  HR: 64 (15 Apr 2021 21:25) (64 - 65)  BP: 119/60 (15 Apr 2021 21:25) (119/60 - 124/64)  BP(mean): --  RR: 18 (15 Apr 2021 21:25) (18 - 18)  SpO2: 99% (15 Apr 2021 21:25) (99% - 99%)        MICROBIOLOGY:  .Nose Nose  04-07-21   No MRSA isolated  No Staph Aureus (MSSA) isolated "This can represent normal nasal  carriage.  PCR is more sensitive for identifying MRSA/MSSA carriage"  --  --      .Blood Blood-Peripheral  04-07-21   No Growth Final  --  --      .Blood Blood-Peripheral  04-07-21   No Growth Final  --  --      RADIOLOGY    < from: CT Lower Extremity w/wo IV Cont, Left (04.12.21 @ 14:45) >  IMPRESSION: Diffuse cellulitic change, as above. No abscess or CT evidence of osteomyelitis    < end of copied text >  
Alayna Valderrama MD  Internal Medicine PGY-1  Pager# [phone]; [facility] Pager# [phone]    PATIENT: ASHANTI FLOWER, MRN: [id number]    CHIEF COMPLAINT: Patient is a 60y old  Female who presents with a chief complaint of Leg Swelling (2021 13:59)    INTERVAL HISTORY/OVERNIGHT EVENTS: No overnight events. At bedside, patient has been sleeping and eating well. Denies N/V/D/C. Last BM x1 regular yesterday. Denies abdominal pain. Denies chest pain or SOB, cough. Has been ambulating with assistance. Oriented to person, place, and time. Breathing comfortably on room air.    REVIEW OF SYSTEMS:  [X] All other systems negative  [ ] Unable to assess ROS because ________.    MEDICATIONS:  MEDICATIONS  (STANDING):  apixaban 5 milliGRAM(s) Oral two times a day  ceFAZolin   IVPB 2000 milliGRAM(s) IV Intermittent every 8 hours  famotidine    Tablet 20 milliGRAM(s) Oral daily  metoprolol tartrate 12.5 milliGRAM(s) Oral two times a day  nystatin Powder 1 Application(s) Topical two times a day  pantoprazole    Tablet 40 milliGRAM(s) Oral before breakfast    MEDICATIONS  (PRN):  AQUAPHOR (petrolatum Ointment) 1 Application(s) Topical two times a day PRN dry skin over cellulitic region  oxyCODONE    IR 10 milliGRAM(s) Oral every 6 hours PRN Severe Pain (7 - 10)      ALLERGIES: Allergies    penicillins (Unknown)    Intolerances        OBJECTIVE:    CAPILLARY BLOOD GLUCOSE  CAPILLARY BLOOD GLUCOSE        I&O's Summary    Daily     Daily Weight in k.2 (2021 06:34)    Vital Signs Last 24 Hrs  T(C): 36.8 (2021 06:34), Max: 36.8 (2021 06:45)  T(F): 98.2 (2021 06:34), Max: 98.3 (2021 06:45)  HR: 64 (2021 06:34) (58 - 65)  BP: 118/72 (2021 06:34) (102/65 - 123/72)  BP(mean): --  RR: 17 (2021 06:34) (16 - 18)  SpO2: 98% (2021 06:34) (97% - 100%)    PHYSICAL EXAMINATION:  General: Comfortable, no acute distress, cooperative with exam.  HEENT: PERRLA, EOMI, moist mucous membranes.  Respiratory: CTAB, normal respiratory effort, no coughing, wheezes, crackles, or rales.  CV: RRR, S1S2, no murmurs, rubs or gallops. No JVD. Distal pulses intact.  Abdominal: Soft, nontender, nondistended, no rebound or guarding, normal bowel sounds.  Neurology: AOx3, no focal neuro defects, SHERWOOD x 4.  Extremities: No pitting edema, + Peripheral pulses.  Incisions:   Tubes:    LABS:                          11.4   8.29  )-----------( 377      ( 2021 07:48 )             36.0     04-13    138  |  100  |  9   ----------------------------<  97  4.1   |  27  |  0.79    Ca    9.5      2021 07:48  Phos  4.2     04-13  Mg     2.2     04-13    TPro  7.2  /  Alb  3.4  /  TBili  0.4  /  DBili  x   /  AST  24  /  ALT  32  /  AlkPhos  105  04-13    LIVER FUNCTIONS - ( 2021 07:48 )  Alb: 3.4 g/dL / Pro: 7.2 g/dL / ALK PHOS: 105 U/L / ALT: 32 U/L / AST: 24 U/L / GGT: x           TELEMETRY: N/A    EKG: N/A    IMAGING: No new images or tests  
Alayna Valderrama MD  Internal Medicine PGY-1  Pager# [phone]; [facility] Pager# [phone]    PATIENT: ASHANTI FLOWER, MRN: [id number]    CHIEF COMPLAINT: Patient is a 60y old  Female who presents with a chief complaint of Leg Swelling (11 Apr 2021 06:34)    INTERVAL HISTORY/OVERNIGHT EVENTS: No overnight events. At bedside, patient has been sleeping and eating well. Denies N/V/D/C. Last BM x1 regular yesterday. Denies abdominal pain. Denies chest pain or SOB, cough. Has been ambulating with assistance. Oriented to person, place, and time. Breathing comfortably on room air.    REVIEW OF SYSTEMS:  [X] All other systems negative  [ ] Unable to assess ROS because ________.    MEDICATIONS:  MEDICATIONS  (STANDING):  apixaban 5 milliGRAM(s) Oral two times a day  famotidine    Tablet 20 milliGRAM(s) Oral daily  metoprolol tartrate 12.5 milliGRAM(s) Oral two times a day  pantoprazole    Tablet 40 milliGRAM(s) Oral before breakfast  vancomycin  IVPB 1500 milliGRAM(s) IV Intermittent every 12 hours    MEDICATIONS  (PRN):  oxyCODONE    IR 10 milliGRAM(s) Oral every 6 hours PRN Severe Pain (7 - 10)      ALLERGIES: Allergies    penicillins (Unknown)    Intolerances        OBJECTIVE:    CAPILLARY BLOOD GLUCOSE  CAPILLARY BLOOD GLUCOSE        I&O's Summary    Daily     Daily     Vital Signs Last 24 Hrs  T(C): 37.4 (11 Apr 2021 22:45), Max: 37.4 (11 Apr 2021 22:45)  T(F): 99.4 (11 Apr 2021 22:45), Max: 99.4 (11 Apr 2021 22:45)  HR: 68 (11 Apr 2021 22:45) (68 - 75)  BP: 121/68 (11 Apr 2021 22:45) (121/68 - 126/69)  BP(mean): --  RR: 18 (11 Apr 2021 22:45) (18 - 18)  SpO2: 99% (11 Apr 2021 22:45) (99% - 100%)    PHYSICAL EXAMINATION:  General: Comfortable, no acute distress, cooperative with exam.  HEENT: PERRLA, EOMI, moist mucous membranes.  Respiratory: CTAB, normal respiratory effort, no coughing, wheezes, crackles, or rales.  CV: RRR, S1S2, no murmurs, rubs or gallops. No JVD. Distal pulses intact.  Abdominal: Soft, nontender, nondistended, no rebound or guarding, normal bowel sounds.  Neurology: AOx3, no focal neuro defects, SHERWOOD x 4.  Extremities: No pitting edema, + Peripheral pulses.  Incisions:   Tubes:    LABS:                          11.1   7.15  )-----------( 332      ( 11 Apr 2021 07:05 )             35.4     04-11    138  |  104  |  8   ----------------------------<  93  4.3   |  23  |  0.66    Ca    9.1      11 Apr 2021 07:05  Phos  3.5     04-11  Mg     2.1     04-11        TELEMETRY: N/A    EKG: N/A    IMAGING: No new images or tests  
ASHANTI FLOWER 60y MRN-[id number]    Patient is a 60y old  Female who presents with a chief complaint of Leg Swelling (15 Apr 2021 06:46)      Follow Up/CC:  ID following for leg cellulitis    Interval History/ROS: no fever, some pain with ambulation     Allergies    penicillins (Unknown)    Intolerances        ANTIMICROBIALS:  ceFAZolin   IVPB 2000 every 8 hours      MEDICATIONS  (STANDING):  apixaban 5 milliGRAM(s) Oral two times a day  ceFAZolin   IVPB 2000 milliGRAM(s) IV Intermittent every 8 hours  famotidine    Tablet 20 milliGRAM(s) Oral daily  metoprolol tartrate 12.5 milliGRAM(s) Oral two times a day  nystatin Powder 1 Application(s) Topical two times a day  pantoprazole    Tablet 40 milliGRAM(s) Oral before breakfast  polyethylene glycol 3350 17 Gram(s) Oral daily  senna 2 Tablet(s) Oral at bedtime    MEDICATIONS  (PRN):  AQUAPHOR (petrolatum Ointment) 1 Application(s) Topical two times a day PRN dry skin over cellulitic region  oxyCODONE    IR 10 milliGRAM(s) Oral every 6 hours PRN Severe Pain (7 - 10)        Vital Signs Last 24 Hrs  T(C): 36.6 (15 Apr 2021 13:04), Max: 37 (14 Apr 2021 15:29)  T(F): 97.9 (15 Apr 2021 13:04), Max: 98.6 (14 Apr 2021 15:29)  HR: 60 (15 Apr 2021 13:04) (60 - 70)  BP: 108/55 (15 Apr 2021 13:04) (108/55 - 130/50)  BP(mean): --  RR: 18 (15 Apr 2021 13:04) (17 - 18)  SpO2: 100% (15 Apr 2021 13:04) (98% - 100%)    CBC Full  -  ( 14 Apr 2021 09:57 )  WBC Count : 9.97 K/uL  RBC Count : 4.05 M/uL  Hemoglobin : 11.8 g/dL  Hematocrit : 36.5 %  Platelet Count - Automated : 366 K/uL  Mean Cell Volume : 90.1 fL  Mean Cell Hemoglobin : 29.1 pg  Mean Cell Hemoglobin Concentration : 32.3 gm/dL  Auto Neutrophil # : x  Auto Lymphocyte # : x  Auto Monocyte # : x  Auto Eosinophil # : x  Auto Basophil # : x  Auto Neutrophil % : x  Auto Lymphocyte % : x  Auto Monocyte % : x  Auto Eosinophil % : x  Auto Basophil % : x    04-14    136  |  101  |  9   ----------------------------<  101<H>  4.1   |  27  |  0.75    Ca    9.4      14 Apr 2021 09:57  Phos  3.7     04-14  Mg     2.3     04-14            MICROBIOLOGY:  .Nose Nose  04-07-21   No MRSA isolated  No Staph Aureus (MSSA) isolated "This can represent normal nasal  carriage.  PCR is more sensitive for identifying MRSA/MSSA carriage"  --  --      .Blood Blood-Peripheral  04-07-21   No Growth Final  --  --      .Blood Blood-Peripheral  04-07-21   No Growth Final  --  --      RADIOLOGY    < from: CT Lower Extremity w/wo IV Cont, Left (04.12.21 @ 14:45) >  IMPRESSION: Diffuse cellulitic change, as above. No abscess or CT evidence of osteomyelitis    < end of copied text >  
Alayna Valderrama MD  Internal Medicine PGY-1  Pager# [phone]; [facility] Pager# [phone]    PATIENT: ASHANTI FLOWER, MRN: [id number]    CHIEF COMPLAINT: Patient is a 60y old  Female who presents with a chief complaint of Leg Swelling (07 Apr 2021 17:38)    INTERVAL HISTORY/OVERNIGHT EVENTS: Overnight pt complaining of pain, requiring morphine 2mg x1 then IV Tylenol 1g x1. At bedside, patient has been sleeping and eating well. Denies N/V/D/C. Last BM x1 regular yesterday. Denies abdominal pain. Denies chest pain or SOB, cough. Has been ambulating with assistance. Oriented to person, place, and time. Breathing comfortably on room air.    REVIEW OF SYSTEMS:  [X] All other systems negative  [ ] Unable to assess ROS because ________.    MEDICATIONS:  MEDICATIONS  (STANDING):  apixaban 5 milliGRAM(s) Oral two times a day  famotidine    Tablet 20 milliGRAM(s) Oral daily  metoprolol tartrate 12.5 milliGRAM(s) Oral two times a day  pantoprazole    Tablet 40 milliGRAM(s) Oral before breakfast  vancomycin  IVPB 1500 milliGRAM(s) IV Intermittent every 12 hours    MEDICATIONS  (PRN):  acetaminophen   Tablet .. 650 milliGRAM(s) Oral every 6 hours PRN Temp greater or equal to 38C (100.4F), Mild Pain (1 - 3), Moderate Pain (4 - 6)  oxyCODONE    IR 5 milliGRAM(s) Oral every 6 hours PRN Severe Pain (7 - 10)      ALLERGIES: Allergies    penicillins (Unknown)    Intolerances        OBJECTIVE:    CAPILLARY BLOOD GLUCOSE  CAPILLARY BLOOD GLUCOSE        I&O's Summary    Daily Height in cm: 172.72 (07 Apr 2021 13:00)    Daily     Vital Signs Last 24 Hours  ASHANTI FLOWER    PHYSICAL EXAMINATION:  General: Comfortable, no acute distress, cooperative with exam.  HEENT: PERRLA, EOMI, moist mucous membranes.  Respiratory: CTAB, normal respiratory effort, no coughing, wheezes, crackles, or rales.  CV: RRR, S1S2, no murmurs, rubs or gallops. No JVD. Distal pulses intact.  Abdominal: Soft, nontender, nondistended, no rebound or guarding, normal bowel sounds.  Neurology: AOx3, no focal neuro defects, SHERWOOD x 4.  Extremities: No pitting edema, + Peripheral pulses.  Incisions:   Tubes:    LABS:                          13.0   12.29 )-----------( 274      ( 07 Apr 2021 14:30 )             39.9     04-07    135  |  98  |  10  ----------------------------<  95  3.2<L>   |  22  |  0.71    Ca    9.4      07 Apr 2021 14:30  Phos  2.9     04-07  Mg     2.0     04-07    TPro  8.0  /  Alb  4.1  /  TBili  0.7  /  DBili  x   /  AST  42<H>  /  ALT  44<H>  /  AlkPhos  130<H>  04-07    LIVER FUNCTIONS - ( 07 Apr 2021 14:30 )  Alb: 4.1 g/dL / Pro: 8.0 g/dL / ALK PHOS: 130 U/L / ALT: 44 U/L / AST: 42 U/L / GGT: x           PT/INR - ( 07 Apr 2021 14:30 )   PT: 17.8 sec;   INR: 1.60 ratio         PTT - ( 07 Apr 2021 14:30 )  PTT:36.7 sec            TELEMETRY: N/A    EKG: N/A    IMAGING: No new images or tests

## 2021-04-16 NOTE — PROGRESS NOTE ADULT - RS GEN PE MLT RESP DETAILS PC
respirations non-labored/clear to auscultation bilaterally/no wheezes
clear to auscultation bilaterally/no wheezes
respirations non-labored/clear to auscultation bilaterally/no wheezes

## 2021-04-16 NOTE — PROGRESS NOTE ADULT - NSICDXPILOT_GEN_ALL_CORE
[place]

## 2021-04-16 NOTE — PROGRESS NOTE ADULT - PROVIDER SPECIALTY LIST ADULT
Internal Medicine
Infectious Disease
Internal Medicine
Infectious Disease
Infectious Disease

## 2021-04-16 NOTE — PROGRESS NOTE ADULT - ASSESSMENT
59yo F w/ h/o paroxysmal Afib (on Eliquis), varicose veins, gastric sleeve p/w cellulitis after failing outpatient PO therapy. 
59yo F w/ h/o paroxysmal Afib (on Eliquis), PAD, gastric sleeve, morbid obesity (BMI=44.1), not diabetic (a1c=5.4%) p/w left leg non-purulent cellulitis. She only took 4 doses of PO clindamycin but was getting worse with fever and increased redness so she came to ED. She was admitted on 4/7. Getting IV vancomycin here with level around 12-14. CT on 4/12 shows cellulitis without abscess. No DVT. Nasal MRSA culture no isolated. No more fever in the hospital. WBC trending down from 12 to 8. However, the redness of the leg is still significant without much improvement so ID is consulted for abx. She was told by her mother that she had PCN allergy, but likely not a real allergy.    #Left leg non-purulent cellulitis:  - Likely strep infection  - better  - keflex 500 mg po q6 x 6 more days  - f/u with me in ID office next Wednesday or Thursday [phone]     Farhat Baxter  Attending Physician   Division of Infectious Disease  Pager #[phone]  Available on [employer] Teams also  After 5pm/weekend or no response, call #[phone]    D/w Dr. Holliday   
59yo F w/ h/o paroxysmal Afib (on Eliquis), varicose veins, gastric sleeve p/w cellulitis after failing outpatient PO therapy. 
59yo F w/ h/o paroxysmal Afib (on Eliquis), varicose veins, gastric sleeve p/w cellulitis after failing outpatient PO therapy. 
61yo F w/ h/o paroxysmal Afib (on Eliquis), varicose veins, gastric sleeve p/w cellulitis after failing outpatient PO therapy. 
61yo F w/ h/o paroxysmal Afib (on Eliquis), varicose veins, gastric sleeve p/w cellulitis after failing outpatient PO therapy. 
59yo F w/ h/o paroxysmal Afib (on Eliquis), PAD, gastric sleeve p/w cellulitis after failing outpatient PO therapy. 
59yo F w/ h/o paroxysmal Afib (on Eliquis), varicose veins, gastric sleeve p/w cellulitis after failing outpatient PO therapy now improving w/ IV Ancef. 
61yo F w/ h/o paroxysmal Afib (on Eliquis), PAD, gastric sleeve, morbid obesity (BMI=44.1), not diabetic (a1c=5.4%) p/w left leg non-purulent cellulitis. She only took 4 doses of PO clindamycin but was getting worse with fever and increased redness so she came to ED. She was admitted on 4/7. Getting IV vancomycin here with level around 12-14. CT on 4/12 shows cellulitis without abscess. No DVT. Nasal MRSA culture no isolated. No more fever in the hospital. WBC trending down from 12 to 8. However, the redness of the leg is still significant without much improvement so ID is consulted for abx. She was told by her mother that she had PCN allergy, but likely not a real allergy.    #Left leg non-purulent cellulitis:  - Likely strep infection  - cont Cefazolin 2g q8h  - slow improvement   - hope to switch to po keflex next 24-36 hours    Farhat Baxter  Attending Physician   Division of Infectious Disease  Pager #[phone]  Available on [employer] Teams also  After 5pm/weekend or no response, call #[phone]    D/w Dr. Holliday   
61yo F w/ h/o paroxysmal Afib (on Eliquis), varicose veins, gastric sleeve p/w cellulitis after failing outpatient PO therapy now improving w/ IV Ancef. 
59yo F w/ h/o paroxysmal Afib (on Eliquis), varicose veins, gastric sleeve p/w cellulitis after failing outpatient PO therapy. 
61yo F w/ h/o paroxysmal Afib (on Eliquis), PAD, gastric sleeve, morbid obesity (BMI=44.1), not diabetic (a1c=5.4%) p/w left leg non-purulent cellulitis. She only took 4 doses of PO clindamycin but was getting worse with fever and increased redness so she came to ED. She was admitted on 4/7. Getting IV vancomycin here with level around 12-14. CT on 4/12 shows cellulitis without abscess. No DVT. Nasal MRSA culture no isolated. No more fever in the hospital. WBC trending down from 12 to 8. However, the redness of the leg is still significant without much improvement so ID is consulted for abx. She was told by her mother that she had PCN allergy, but likely not a real allergy.    #Left leg non-purulent cellulitis:  - Likely strep infection  - cont Cefazolin 2g q8h  - better  - hope to switch to po keflex next 24-36 hours    Farhat Baxter  Attending Physician   Division of Infectious Disease  Pager #[phone]  Available on [employer] Teams also  After 5pm/weekend or no response, call #[phone]    D/w Dr. Holliday

## 2021-04-16 NOTE — PROGRESS NOTE ADULT - PROBLEM SELECTOR PROBLEM 3
Gastroesophageal reflux disease, unspecified whether esophagitis present
Peripheral artery disease
Gastroesophageal reflux disease, unspecified whether esophagitis present

## 2021-04-16 NOTE — PROGRESS NOTE ADULT - ATTENDING COMMENTS
cellulitis continues to improve but still very tender and angry in appearance - will continue with IV vanco for now.
closely monitor for improvement.  continues to be warm and erythematous with swelling - tenderness at the area.  If there is no improvement by tomorrow then would consider ID eval.
follow up with ID - transition to oral antibiotics and follow up with ID for further recs.  discharge planning and coordination ~ 45mins.
mild improvement seen on LLE - continue with cefazolin for now.  appreciate ID.
CT reviewed - no abscess - will obtain ID eval.
appreciate ID and wound care evals - will change antibiotics to cefazolin and monitor.
pt with mild improvement in the cellulitis - appreciate ID recs - continue with Ancef and monitor.
continue with IV vanco- check trough prior to next dose.  increase oxy for pain control.
continue with vanco - significant improvement in cellulitis.

## 2021-04-16 NOTE — PROGRESS NOTE ADULT - PROBLEM SELECTOR PROBLEM 2
PAF (paroxysmal atrial fibrillation)

## 2021-04-16 NOTE — PROGRESS NOTE ADULT - GASTROINTESTINAL DETAILS
soft/nontender/no distention/no guarding/no rigidity

## 2021-04-22 ENCOUNTER — APPOINTMENT (OUTPATIENT)
Dept: INFECTIOUS DISEASE | Facility: CLINIC | Age: 61
End: 2021-04-22
Payer: COMMERCIAL

## 2021-04-22 ENCOUNTER — NON-APPOINTMENT (OUTPATIENT)
Age: 61
End: 2021-04-22

## 2021-04-22 VITALS
HEIGHT: 68 IN | SYSTOLIC BLOOD PRESSURE: 117 MMHG | BODY MASS INDEX: 44.41 KG/M2 | OXYGEN SATURATION: 98 % | TEMPERATURE: 97.7 F | WEIGHT: 293 LBS | DIASTOLIC BLOOD PRESSURE: 63 MMHG | HEART RATE: 55 BPM

## 2021-04-22 DIAGNOSIS — Z09 ENCOUNTER FOR FOLLOW-UP EXAMINATION AFTER COMPLETED TREATMENT FOR CONDITIONS OTHER THAN MALIGNANT NEOPLASM: ICD-10-CM

## 2021-04-22 PROCEDURE — 99212 OFFICE O/P EST SF 10 MIN: CPT

## 2021-04-22 PROCEDURE — 99072 ADDL SUPL MATRL&STAF TM PHE: CPT

## 2021-04-23 ENCOUNTER — APPOINTMENT (OUTPATIENT)
Dept: WOUND CARE | Facility: CLINIC | Age: 61
End: 2021-04-23
Payer: COMMERCIAL

## 2021-04-23 VITALS — TEMPERATURE: 96.4 F | HEIGHT: 68 IN | WEIGHT: 293 LBS | BODY MASS INDEX: 44.41 KG/M2

## 2021-04-23 DIAGNOSIS — T14.90XA INJURY, UNSPECIFIED, INITIAL ENCOUNTER: ICD-10-CM

## 2021-04-23 PROCEDURE — 99213 OFFICE O/P EST LOW 20 MIN: CPT

## 2021-04-23 PROCEDURE — 99072 ADDL SUPL MATRL&STAF TM PHE: CPT

## 2021-04-23 NOTE — PHYSICAL EXAM
[Normal Breath Sounds] : Normal breath sounds [Skin Ulcer] : ulcer [Anxious] : anxious [Ankle Swelling (On Exam)] : present [Ankle Swelling Bilaterally] : bilaterally  [Varicose Veins Of Lower Extremities] : bilaterally [] : bilaterally [Alert] : alert [Oriented to Person] : oriented to person [Oriented to Place] : oriented to place [Oriented to Time] : oriented to time [Calm] : calm [Please See PDF for Tissue Analytics] : Please See PDF for [employer]. [JVD] : no jugular venous distention  [Abdomen Tenderness] : ~T ~M No abdominal tenderness [de-identified] : NAD, ambulatory [de-identified] : AT [de-identified] : supple [de-identified] : soft [de-identified] : Left lower extremity below knee erythema, not extending and warm, tender on touch

## 2021-04-23 NOTE — HISTORY OF PRESENT ILLNESS
[FreeTextEntry1] : Ms. ASHANTI FLOWER presents to the office with a redness and swelling since 4/5/2021. The wound is located on the left leg. The patient has complaints of pain. The patient says she has had fevers and chills. Was sent to the ER for further evaluation. Patient was found to have cellulitis, admitted in the hospital and completed 10 days of antibiotics post discharge. Patient states the lower extremity is extremely tender, no discharge, and no more fevers or chills. Unable to place compression stalking or anything on left lower extremity 2/2 to pain.\par \par Patient also complains of itching, which results in more pain after patient touchs her lower extremity. \par \par Finished Keflex antibiotics on 4/22/2021

## 2021-04-23 NOTE — PLAN
[FreeTextEntry1] : - Still has some inflammation, ordered 7 days additional Keflex QID\par - Ordered Hydrocortisone cream 1% apply daily for 7 days\par - Follow up in 1 week with telehealth and in office in 2-3 weeks\par - Has appointment with vascular surgery for further intervention.

## 2021-04-23 NOTE — PHYSICAL EXAM
[Normal Breath Sounds] : Normal breath sounds [Skin Ulcer] : ulcer [Anxious] : anxious [Ankle Swelling (On Exam)] : present [Ankle Swelling Bilaterally] : bilaterally  [Varicose Veins Of Lower Extremities] : bilaterally [] : bilaterally [Alert] : alert [Oriented to Person] : oriented to person [Oriented to Place] : oriented to place [Oriented to Time] : oriented to time [Calm] : calm [Please See PDF for Tissue Analytics] : Please See PDF for [employer]. [JVD] : no jugular venous distention  [Abdomen Tenderness] : ~T ~M No abdominal tenderness [de-identified] : NAD, ambulatory [de-identified] : supple [de-identified] : AT [de-identified] : soft [de-identified] : Left lower extremity below knee erythema, not extending and warm, tender on touch

## 2021-04-23 NOTE — REASON FOR VISIT
[Consultation] : a consultation visit [Follow-Up: _____] : a [unfilled] follow-up visit [FreeTextEntry1] : Left leg redness

## 2021-04-27 ENCOUNTER — APPOINTMENT (OUTPATIENT)
Dept: VASCULAR SURGERY | Facility: CLINIC | Age: 61
End: 2021-04-27
Payer: COMMERCIAL

## 2021-04-27 VITALS — TEMPERATURE: 96.8 F

## 2021-04-27 PROCEDURE — 99213 OFFICE O/P EST LOW 20 MIN: CPT

## 2021-04-27 PROCEDURE — 99072 ADDL SUPL MATRL&STAF TM PHE: CPT

## 2021-04-29 NOTE — HISTORY OF PRESENT ILLNESS
[FreeTextEntry1] : 60 yo female with history of venous insufficiency s/p left gsv ablation and has been following with wound care for slow healing left lower extremity venous stasis ulcer.  pt states that the ulcer is now healed\par has severe venous stasis skin changes of the right leg with varicosities\par The patient was recently admitted to the hospital with severe cellulitis of the left lower extremity requiring IV antibiotics.  She presents to the office today for follow-up and evaluation.  She notes that it has improved significantly and she is almost finished with her oral antibiotic regimen.

## 2021-04-29 NOTE — ASSESSMENT
[FreeTextEntry1] : 61 yo female with history of venous insufficiency s/p left gsv ablation\par Has severe skin changes\par Currently left leg cellulitis appears to have improved.  No need for intervention at this time once this resolves patient would be a good candidate for a right leg ablation.

## 2021-04-29 NOTE — HISTORY OF PRESENT ILLNESS
[FreeTextEntry1] : 60 y/o female comes for f/u for left leg cellulitis.\par \par She has 2 doses of keflex left. Leg swelling is better. Has some significant pain with ambulation.\par \par No fever or diarrhea. \par \par No CP, SOB, HA.

## 2021-04-29 NOTE — PHYSICAL EXAM
[JVD] : no jugular venous distention  [Ankle Swelling (On Exam)] : present [Ankle Swelling On The Left] : moderate [Varicose Veins Of Lower Extremities] : bilaterally [] : bilaterally [Ankle Swelling Bilaterally] : severe [Alert] : alert [Calm] : calm [de-identified] : appears well [de-identified] : shallow based ulcer over the left lateral malleolus

## 2021-04-29 NOTE — PHYSICAL EXAM
[General Appearance - Alert] : alert [General Appearance - In No Acute Distress] : in no acute distress [Outer Ear] : the ears and nose were normal in appearance [Neck Appearance] : the appearance of the neck was normal [Oropharynx] : the oropharynx was normal with no thrush [Neck Cervical Mass (___cm)] : no neck mass was observed [Jugular Venous Distention Increased] : there was no jugular-venous distention [Thyroid Diffuse Enlargement] : the thyroid was not enlarged [Auscultation Breath Sounds / Voice Sounds] : lungs were clear to auscultation bilaterally [Heart Sounds] : normal S1 and S2 [FreeTextEntry1] : lef leg cellulitis much better, swelling less [Bowel Sounds] : normal bowel sounds [Abdomen Soft] : soft [Abdomen Tenderness] : non-tender [Costovertebral Angle Tenderness] : no CVA tenderness [Abdomen Mass (___ Cm)] : no abdominal mass palpated [] : no rash [Skin Color & Pigmentation] : normal skin color and pigmentation [No Focal Deficits] : no focal deficits [Oriented To Time, Place, And Person] : oriented to person, place, and time [Affect] : the affect was normal

## 2021-04-29 NOTE — ASSESSMENT
[FreeTextEntry1] : 62 yo female comes for f/u for left leg cellulitis\par \par No fever. Leg better.\par \par Still with pain. Advised f/u with vascular also.\par \par Do not see more need for abx.\par \par D/w pt in detail.  [Treatment Education] : treatment education [Treatment Adherence] : treatment adherence

## 2021-05-12 ENCOUNTER — NON-APPOINTMENT (OUTPATIENT)
Age: 61
End: 2021-05-12

## 2021-05-12 ENCOUNTER — APPOINTMENT (OUTPATIENT)
Dept: ELECTROPHYSIOLOGY | Facility: CLINIC | Age: 61
End: 2021-05-12
Payer: COMMERCIAL

## 2021-05-12 PROCEDURE — 93298 REM INTERROG DEV EVAL SCRMS: CPT

## 2021-05-12 PROCEDURE — G2066: CPT

## 2021-05-13 ENCOUNTER — APPOINTMENT (OUTPATIENT)
Dept: WOUND CARE | Facility: CLINIC | Age: 61
End: 2021-05-13
Payer: COMMERCIAL

## 2021-05-13 DIAGNOSIS — L03.119 CELLULITIS OF UNSPECIFIED PART OF LIMB: ICD-10-CM

## 2021-05-13 DIAGNOSIS — Z98.84 BARIATRIC SURGERY STATUS: ICD-10-CM

## 2021-05-13 PROCEDURE — 99213 OFFICE O/P EST LOW 20 MIN: CPT | Mod: 95

## 2021-05-13 NOTE — PLAN
[FreeTextEntry1] : - \par - Ordered Hydrocortisone cream 1% apply daily for 7 days prn\par - Follow up in 1 week with telehealth and in office in 4-6 weeks\par - Has appointment with vascular surgery for further intervention.

## 2021-05-13 NOTE — HISTORY OF PRESENT ILLNESS
[Home] : at home, [unfilled] , at the time of the visit. [Medical Office: ([facility])___] : at the medical office located in  [Verbal consent obtained from patient] : the patient, [unfilled] [FreeTextEntry1] : Ms. ASHANTI FLOWER presents to the office with a redness and swelling since 4/5/2021. The wound is located on the left leg. The patient has complaints of pain. The patient says she has had fevers and chills. Was sent to the ER for further evaluation. Patient was found to have cellulitis, admitted in the hospital and completed 10 days of antibiotics post discharge. Patient states the lower extremity is extremely tender, no discharge, and no more fevers or chills. Unable to place compression stalking or anything on left lower extremity 2/2 to pain.\par \par Patient also complains of itching, which results in more pain after patient touchs her lower extremity. \par \par Finished Keflex antibiotics on 4/22/2021

## 2021-05-13 NOTE — PHYSICAL EXAM
[JVD] : no jugular venous distention  [Normal Breath Sounds] : Normal breath sounds [Ankle Swelling (On Exam)] : present [Ankle Swelling Bilaterally] : bilaterally  [Varicose Veins Of Lower Extremities] : bilaterally [] : bilaterally [Skin Ulcer] : no ulcer [Alert] : alert [Oriented to Person] : oriented to person [Oriented to Place] : oriented to place [Oriented to Time] : oriented to time [Calm] : calm [de-identified] : nad [de-identified] : jed [de-identified] : Left lower extremity below knee erythema resolved, skin closed [Please See PDF for [employer] Analytics] : Please See PDF for [employer].

## 2021-05-13 NOTE — ASSESSMENT
[FreeTextEntry3] : Improving left lower extremity cellulitis, resolved [FreeTextEntry1] : 61 yr old [place] afib, lagb, sleeve , radio venous ablation left gsv, hh repair\par complexitymod- reviewed previous us/ labs, films- no xr only us\par us shows venous in right, left is still closed\par ann pad going back to see  dr Lehman\par risk low- t on ac \par time30\par  \par lymophedema pump bid with pajama- attention to creases, avoid\par mosquito repellent for summer if outdoors\par

## 2021-06-03 ENCOUNTER — APPOINTMENT (OUTPATIENT)
Dept: MAMMOGRAPHY | Facility: IMAGING CENTER | Age: 61
End: 2021-06-03
Payer: COMMERCIAL

## 2021-06-03 ENCOUNTER — OUTPATIENT (OUTPATIENT)
Dept: OUTPATIENT SERVICES | Facility: HOSPITAL | Age: 61
LOS: 1 days | End: 2021-06-03
Payer: COMMERCIAL

## 2021-06-03 DIAGNOSIS — Z98.84 BARIATRIC SURGERY STATUS: Chronic | ICD-10-CM

## 2021-06-03 DIAGNOSIS — Z95.818 PRESENCE OF OTHER CARDIAC IMPLANTS AND GRAFTS: Chronic | ICD-10-CM

## 2021-06-03 DIAGNOSIS — Z00.8 ENCOUNTER FOR OTHER GENERAL EXAMINATION: ICD-10-CM

## 2021-06-03 PROCEDURE — 77063 BREAST TOMOSYNTHESIS BI: CPT

## 2021-06-03 PROCEDURE — 77063 BREAST TOMOSYNTHESIS BI: CPT | Mod: 26

## 2021-06-03 PROCEDURE — 77067 SCR MAMMO BI INCL CAD: CPT | Mod: 26

## 2021-06-03 PROCEDURE — 77067 SCR MAMMO BI INCL CAD: CPT

## 2021-06-14 ENCOUNTER — NON-APPOINTMENT (OUTPATIENT)
Age: 61
End: 2021-06-14

## 2021-06-17 ENCOUNTER — APPOINTMENT (OUTPATIENT)
Dept: ELECTROPHYSIOLOGY | Facility: CLINIC | Age: 61
End: 2021-06-17
Payer: COMMERCIAL

## 2021-06-17 ENCOUNTER — NON-APPOINTMENT (OUTPATIENT)
Age: 61
End: 2021-06-17

## 2021-06-17 PROCEDURE — G2066: CPT

## 2021-06-17 PROCEDURE — 93298 REM INTERROG DEV EVAL SCRMS: CPT

## 2021-06-24 ENCOUNTER — APPOINTMENT (OUTPATIENT)
Dept: BARIATRICS | Facility: CLINIC | Age: 61
End: 2021-06-24

## 2021-06-28 NOTE — CONSULT NOTE ADULT - SUBJECTIVE AND OBJECTIVE BOX
Patient is a 60y old  Female who presents with a chief complaint of Leg Swelling (13 Apr 2021 07:03)    HPI:  59yo F w/ h/o paroxysmal Afib (on Eliquis), PAD, gastric sleeve p/w cellulitis after failing outpatient PO therapy. Per pt had intermittent fevers for 5d prior to admission, w/ Tmax 104. Two days after onset of fever she also noted worsening L leg swelling a/w erythema, pain, and warmth. Reports some relief w/ Tylenol. Pt had a telehealth appointment during which time her docotr prescribed clindamycin (pt completed 2d prior to admission) and recommended that the patient demarcate the erythema. Per pt the erythema continued to progress past the demarcation lines and she continued to have fevers so she decided to come to the ED. She endorses some associated nonbloody diarrhea beginning at the same time as fever onset and decreased PO intake. Pt denies CP, SOB/HERNANDEZ, N/V/C, dysuria/frequency/urgency. Recently quarantined x2wk due to COVID exposure (children and ); pt is fully vaccinated against COVID. Denies any recent travel.    IN THE ED: VSS (Afebrile, HR 80s, /70, RR 16 @ 100% on RA). WBC = 12.29. K+ = 3.2. Lactate = 1.2. Xray Foot/Ankle/Tibia: +soft tissue swelling. US Duplex: negative for DVT. Received morphine 4 x1, potassium repletion vancomycin x1, and NS 1L bolus. (07 Apr 2021 17:38)       REVIEW OF SYSTEMS  [  ] ROS unobtainable because:    [  ] All other systems negative except as noted below    Constitutional:  [ ] fever [ ] chills  [ ] weight loss  [ ]night sweat  [ ]poor appetite/PO intake [ ]fatigue   Skin:  [ ] rash [ ] phlebitis	  Eyes: [ ] icterus [ ] pain  [ ] discharge	  ENMT: [ ] sore throat  [ ] thrush [ ] ulcers [ ] exudates [ ]anosmia  Respiratory: [ ] dyspnea [ ] hemoptysis [ ] cough [ ] sputum	  Cardiovascular:  [ ] chest pain [ ] palpitations [ ] edema	  Gastrointestinal:  [ ] nausea [ ] vomiting [ ] diarrhea [ ] constipation [ ] pain	  Genitourinary:  [ ] dysuria [ ] frequency [ ] hematuria [ ] discharge [ ] flank pain  [ ] incontinence  Musculoskeletal:  [ ] myalgias [ ] arthralgias [ ] arthritis  [ ] back pain  Neurological:  [ ] headache [ ] weakness [ ] seizures  [ ] confusion/altered mental status    prior hospital charts reviewed [V]  primary team notes reviewed [V]  other consultant notes reviewed [V]    PAST MEDICAL & SURGICAL HISTORY:  Eczema, unspecified type    Hypercholesterolemia    Obesity, unspecified classification, unspecified obesity type, unspecified whether serious comorbidity present    PAF (paroxysmal atrial fibrillation)  on Eliquis    Arthritis    History of nephritis  Glomerular nephritis as a chlld 1968 stable since then    Lipodermatosclerosis of both lower extremities    SIMA on CPAP    Venous insufficiency of lower extremity    Obesity, morbid, BMI 50 or higher    Status post gastric banding surgery  2008    Status post placement of implantable loop recorder  2/2019        SOCIAL HISTORY:  Lives at home w/  and 2 children. Denies tobacco/alcohol/ilicit drug use. Currently employed as a PACU nurse.    FAMILY HISTORY:  FHx: heart disease  father diseased at age 52    FH: lung cancer  mother    Family history of cardiomyopathy  brother diseased at age 54        Allergies  penicillins (Unknown)        ANTIMICROBIALS:  vancomycin  IVPB 1500 every 12 hours      ANTIMICROBIALS (past 90 days):  MEDICATIONS  (STANDING):    vancomycin  IVPB   300 mL/Hr IV Intermittent (04-12-21 @ 23:21)    vancomycin  IVPB   300 mL/Hr IV Intermittent (04-12-21 @ 11:04)   300 mL/Hr IV Intermittent (04-11-21 @ 23:43)   300 mL/Hr IV Intermittent (04-11-21 @ 05:46)   300 mL/Hr IV Intermittent (04-10-21 @ 15:07)   300 mL/Hr IV Intermittent (04-10-21 @ 02:20)   300 mL/Hr IV Intermittent (04-09-21 @ 15:21)   300 mL/Hr IV Intermittent (04-09-21 @ 02:17)   300 mL/Hr IV Intermittent (04-08-21 @ 14:21)   300 mL/Hr IV Intermittent (04-08-21 @ 02:21)    vancomycin  IVPB.   250 mL/Hr IV Intermittent (04-07-21 @ 14:37)        OTHER MEDS:   MEDICATIONS  (STANDING):  apixaban 5 two times a day  famotidine    Tablet 20 daily  metoprolol tartrate 12.5 two times a day  oxyCODONE    IR 10 every 6 hours PRN  pantoprazole    Tablet 40 before breakfast      VITALS:  Vital Signs Last 24 Hrs  T(F): 98.3 (04-13-21 @ 06:45), Max: 99.4 (04-07-21 @ 21:31)    Vital Signs Last 24 Hrs  HR: 58 (04-13-21 @ 06:45) (58 - 70)  BP: 108/65 (04-13-21 @ 06:45) (108/65 - 125/61)  RR: 17 (04-13-21 @ 06:45)  SpO2: 100% (04-13-21 @ 06:45) (98% - 100%)  Wt(kg): --    EXAM:  GA: NAD  HEENT: oral cavity no lesion  CV: nl S1/S2, no RMG  Lungs: CTAB  Abd: BS+, soft, nontender, no rebounding pain  Ext: no edema  Skin:  IV: no phlebitis    Labs:                        11.4   8.29  )-----------( 377      ( 13 Apr 2021 07:48 )             36.0     04-13    138  |  100  |  9   ----------------------------<  97  4.1   |  27  |  0.79    Ca    9.5      13 Apr 2021 07:48  Phos  4.2     04-13  Mg     2.2     04-13    TPro  7.2  /  Alb  3.4  /  TBili  0.4  /  DBili  x   /  AST  24  /  ALT  32  /  AlkPhos  105  04-13      WBC Trend:  WBC Count: 8.29 (04-13-21 @ 07:48)  WBC Count: 7.15 (04-11-21 @ 07:05)  WBC Count: 8.25 (04-10-21 @ 07:33)  WBC Count: 7.75 (04-09-21 @ 07:59)      Auto Neutrophil #: 5.89 K/uL (04-13-21 @ 07:48)  Auto Neutrophil #: 4.74 K/uL (04-11-21 @ 07:05)  Auto Neutrophil #: 5.19 K/uL (04-10-21 @ 07:33)  Auto Neutrophil #: 5.77 K/uL (04-09-21 @ 07:59)  Band Neutrophils %: 1.8 % (04-09-21 @ 07:59)      Creatine Trend:  Creatinine, Serum: 0.79 (04-13)  Creatinine, Serum: 0.67 (04-12)  Creatinine, Serum: 0.66 (04-11)  Creatinine, Serum: 0.61 (04-10)      Liver Biochemical Testing Trend:  Alanine Aminotransferase (ALT/SGPT): 32 (04-13)  Alanine Aminotransferase (ALT/SGPT): 32 (04-12)  Alanine Aminotransferase (ALT/SGPT): 35 *H* (04-08)  Alanine Aminotransferase (ALT/SGPT): 44 *H* (04-07)  Aspartate Aminotransferase (AST/SGOT): 24 (04-13-21 @ 07:48)  Aspartate Aminotransferase (AST/SGOT): 41 (04-12-21 @ 07:17)  Aspartate Aminotransferase (AST/SGOT): 29 (04-08-21 @ 07:18)  Aspartate Aminotransferase (AST/SGOT): 42 (04-07-21 @ 14:30)  Bilirubin Total, Serum: 0.4 (04-13)  Bilirubin Total, Serum: 0.4 (04-12)  Bilirubin Total, Serum: 0.6 (04-08)  Bilirubin Total, Serum: 0.7 (04-07)    MICROBIOLOGY:  Vancomycin Level, Trough: 13.7 (04-11 @ 22:49)  Vancomycin Level, Trough: 14.5 (04-09 @ 14:32)  Vancomycin Level, Trough: 12.4 (04-09 @ 01:18)    Culture - Nose (collected 07 Apr 2021 14:28)  Source: .Nose Nose  Final Report:    No MRSA isolated    No Staph Aureus (MSSA) isolated "This can represent normal nasal    carriage.  PCR is more sensitive for identifying MRSA/MSSA carriage"    Culture - Blood (collected 07 Apr 2021 14:21)  Source: .Blood Blood-Peripheral  Final Report:    No Growth Final    Culture - Blood (collected 07 Apr 2021 14:00)  Source: .Blood Blood-Peripheral  Final Report:    No Growth Final    COVID-19 PCR: NotDetec (04-07-21 @ 15:09)  COVID-19 Yinka Domain AB Interp: Positive (04-08-21 @ 10:24)    A1C with Estimated Average Glucose Result: 5.4 % (04-07-21 @ 14:30)    RADIOLOGY:  imaging below personally reviewed    < from: CT Lower Extremity w/wo IV Cont, Left (04.12.21 @ 14:45) >  FINDINGS: There is diffuse subcutaneous soft tissue infiltration about the mid to distal aspect of the calf and along the dorsum of the foot, consistent with the history of cellulitis. There are multiple calcifications throughout the subcutaneous tissues at the level of the mid to distal calf. No well-formed collections are demonstrated. There is medial compartment predominant osteoarthritis at the knee. No acute fracture or dislocation is demonstrated. There are no areas of cortical destruction or periosteal reaction to suggest osteomyelitis. There are plantar and dorsal calcaneal enthesophytes. There are multiple varicosities in the subcutaneous tissues.  IMPRESSION: Diffuse cellulitic change, as above. No abscess or CT evidence of osteomyelitis  < end of copied text >    < from: US Duplex Venous Lower Ext Ltd, Left (04.07.21 @ 16:30) >  IMPRESSION:  No evidence of left lower extremity deep venous thrombosis.  < end of copied text >    < from: Xray Ankle Complete 3 Views, Left (04.07.21 @ 15:29) >  IMPRESSION:  Diffuse soft tissue swelling with granular dystrophic calcifications predominantly in lowercalf subcutaneous tissues.    No tracking soft tissue gas collections, radiopaque foreign bodies, or gross radiographic evidence for osteomyelitis.    No fractures, dislocations, or osseous or joint deformities.    Mild knee multicompartment osteoarthritis. Preserved ankle and visualized midfoot and hindfoot joint spaces and no joint margin erosions.    Small calcaneal enthesophytes.    No discrete lytic or blastic lesions.    < end of copied text >         Is This A New Presentation, Or A Follow-Up?: Skin Lesions How Severe Is Your Skin Lesion?: moderate Have Your Skin Lesions Been Treated?: not been treated Patient is a 60y old  Female who presents with a chief complaint of Leg Swelling (13 Apr 2021 07:03)    HPI:  59yo F w/ h/o paroxysmal Afib (on Eliquis), PAD, gastric sleeve p/w cellulitis after failing outpatient PO therapy. Per pt had intermittent fevers for 5d prior to admission, w/ Tmax 104. Two days after onset of fever she also noted worsening L leg swelling a/w erythema, pain, and warmth. Reports some relief w/ Tylenol. Pt had a telehealth appointment during which time her docotr prescribed clindamycin (pt completed 2d prior to admission) and recommended that the patient demarcate the erythema. Per pt the erythema continued to progress past the demarcation lines and she continued to have fevers so she decided to come to the ED. She endorses some associated nonbloody diarrhea beginning at the same time as fever onset and decreased PO intake. Pt denies CP, SOB/HERNANDEZ, N/V/C, dysuria/frequency/urgency. Recently quarantined x2wk due to COVID exposure (children and ); pt is fully vaccinated against COVID. Denies any recent travel.    IN THE ED: VSS (Afebrile, HR 80s, /70, RR 16 @ 100% on RA). WBC = 12.29. K+ = 3.2. Lactate = 1.2. Xray Foot/Ankle/Tibia: +soft tissue swelling. US Duplex: negative for DVT. Received morphine 4 x1, potassium repletion vancomycin x1, and NS 1L bolus. (07 Apr 2021 17:38)    She is still working as a nurse.  On 4/2, after resting at home from working all day, she started to feel fever. On 4/3, she had fever all day, [id number] at home. On 4/5, she started to develop left leg swelling.  She only took 4 doses of clindamycin, but the left leg cellulitis is getting worse and she has fever, so she became to ED.  In the hospital, she did not have any more fever. Left leg cellulitis was not getting better, but seems to be better comparing to yesterday.  She denies any trauma. She has a dog, but denies licking or sniffing on her legs.  She had COVID vaccines on 12/2020 and 01/2021.  Per pt, she had streptococcal pharyngitis 20 years ago but did not recall which abx she took      REVIEW OF SYSTEMS  [  ] ROS unobtainable because:    [ V ] All other systems negative except as noted below    Constitutional:  [V ] fever [ V] chills  [ ] weight loss  [ ]night sweat  [ ]poor appetite/PO intake [ ]fatigue   Skin:  [ ] rash [ ] phlebitis	  Eyes: [ ] icterus [ ] pain  [ ] discharge	  ENMT: [ ] sore throat  [ ] thrush [ ] ulcers [ ] exudates [ ]anosmia  Respiratory: [ ] dyspnea [ ] hemoptysis [ ] cough [ ] sputum	  Cardiovascular:  [ ] chest pain [ ] palpitations [ ] edema	  Gastrointestinal:  [ ] nausea [ ] vomiting [ ] diarrhea [ ] constipation [ ] pain	  Genitourinary:  [ ] dysuria [ ] frequency [ ] hematuria [ ] discharge [ ] flank pain  [ ] incontinence  Musculoskeletal:  [V ] myalgias [ ] arthralgias [ ] arthritis  [ ] back pain  Neurological:  [ ] headache [ ] weakness [ ] seizures  [ ] confusion/altered mental status    prior hospital charts reviewed [V]  primary team notes reviewed [V]  other consultant notes reviewed [V]    PAST MEDICAL & SURGICAL HISTORY:  Eczema, unspecified type    Hypercholesterolemia    Obesity, unspecified classification, unspecified obesity type, unspecified whether serious comorbidity present    PAF (paroxysmal atrial fibrillation)  on Eliquis    Arthritis    History of nephritis  Glomerular nephritis as a chlld 1968 stable since then    Lipodermatosclerosis of both lower extremities    SIMA on CPAP    Venous insufficiency of lower extremity    Obesity, morbid, BMI 50 or higher    Status post gastric banding surgery  2008    Status post placement of implantable loop recorder  2/2019    SOCIAL HISTORY:  Lives at home w/  and 2 children. Denies tobacco/alcohol/ilicit drug use. Currently employed as a PACU nurse. Has 1 dog.    FAMILY HISTORY:  FHx: heart disease  father diseased at age 52    FH: lung cancer  mother    Family history of cardiomyopathy  brother diseased at age 54    Allergies  penicillin- she was told by her mother that she is allergic to it, but did not recall anything or reaction.      ANTIMICROBIALS:  vancomycin  IVPB 1500 every 12 hours      ANTIMICROBIALS (past 90 days):  MEDICATIONS  (STANDING):    vancomycin  IVPB   300 mL/Hr IV Intermittent (04-12-21 @ 23:21)    vancomycin  IVPB   300 mL/Hr IV Intermittent (04-12-21 @ 11:04)   300 mL/Hr IV Intermittent (04-11-21 @ 23:43)   300 mL/Hr IV Intermittent (04-11-21 @ 05:46)   300 mL/Hr IV Intermittent (04-10-21 @ 15:07)   300 mL/Hr IV Intermittent (04-10-21 @ 02:20)   300 mL/Hr IV Intermittent (04-09-21 @ 15:21)   300 mL/Hr IV Intermittent (04-09-21 @ 02:17)   300 mL/Hr IV Intermittent (04-08-21 @ 14:21)   300 mL/Hr IV Intermittent (04-08-21 @ 02:21)    vancomycin  IVPB.   250 mL/Hr IV Intermittent (04-07-21 @ 14:37)        OTHER MEDS:   MEDICATIONS  (STANDING):  apixaban 5 two times a day  famotidine    Tablet 20 daily  metoprolol tartrate 12.5 two times a day  oxyCODONE    IR 10 every 6 hours PRN  pantoprazole    Tablet 40 before breakfast      VITALS:  Vital Signs Last 24 Hrs  T(F): 98.3 (04-13-21 @ 06:45), Max: 99.4 (04-07-21 @ 21:31)    Vital Signs Last 24 Hrs  HR: 58 (04-13-21 @ 06:45) (58 - 70)  BP: 108/65 (04-13-21 @ 06:45) (108/65 - 125/61)  RR: 17 (04-13-21 @ 06:45)  SpO2: 100% (04-13-21 @ 06:45) (98% - 100%)  Wt(kg): --    EXAM:  GA: NAD, morbid obese  HEENT: oral cavity no lesion  CV: nl S1/S2, no RMG  Lungs: CTAB  Abd: BS+, soft, nontender, no rebounding pain  Ext: left leg demarcated redness and warmth, consistent with cellulitis, non-purulent; right leg normal  IV: no phlebitis    Labs:                        11.4   8.29  )-----------( 377      ( 13 Apr 2021 07:48 )             36.0     04-13    138  |  100  |  9   ----------------------------<  97  4.1   |  27  |  0.79    Ca    9.5      13 Apr 2021 07:48  Phos  4.2     04-13  Mg     2.2     04-13    TPro  7.2  /  Alb  3.4  /  TBili  0.4  /  DBili  x   /  AST  24  /  ALT  32  /  AlkPhos  105  04-13      WBC Trend:  WBC Count: 8.29 (04-13-21 @ 07:48)  WBC Count: 7.15 (04-11-21 @ 07:05)  WBC Count: 8.25 (04-10-21 @ 07:33)  WBC Count: 7.75 (04-09-21 @ 07:59)      Auto Neutrophil #: 5.89 K/uL (04-13-21 @ 07:48)  Auto Neutrophil #: 4.74 K/uL (04-11-21 @ 07:05)  Auto Neutrophil #: 5.19 K/uL (04-10-21 @ 07:33)  Auto Neutrophil #: 5.77 K/uL (04-09-21 @ 07:59)  Band Neutrophils %: 1.8 % (04-09-21 @ 07:59)      Creatine Trend:  Creatinine, Serum: 0.79 (04-13)  Creatinine, Serum: 0.67 (04-12)  Creatinine, Serum: 0.66 (04-11)  Creatinine, Serum: 0.61 (04-10)      Liver Biochemical Testing Trend:  Alanine Aminotransferase (ALT/SGPT): 32 (04-13)  Alanine Aminotransferase (ALT/SGPT): 32 (04-12)  Alanine Aminotransferase (ALT/SGPT): 35 *H* (04-08)  Alanine Aminotransferase (ALT/SGPT): 44 *H* (04-07)  Aspartate Aminotransferase (AST/SGOT): 24 (04-13-21 @ 07:48)  Aspartate Aminotransferase (AST/SGOT): 41 (04-12-21 @ 07:17)  Aspartate Aminotransferase (AST/SGOT): 29 (04-08-21 @ 07:18)  Aspartate Aminotransferase (AST/SGOT): 42 (04-07-21 @ 14:30)  Bilirubin Total, Serum: 0.4 (04-13)  Bilirubin Total, Serum: 0.4 (04-12)  Bilirubin Total, Serum: 0.6 (04-08)  Bilirubin Total, Serum: 0.7 (04-07)    MICROBIOLOGY:  Vancomycin Level, Trough: 13.7 (04-11 @ 22:49)  Vancomycin Level, Trough: 14.5 (04-09 @ 14:32)  Vancomycin Level, Trough: 12.4 (04-09 @ 01:18)    Culture - Nose (collected 07 Apr 2021 14:28)  Source: .Nose Nose  Final Report:    No MRSA isolated    No Staph Aureus (MSSA) isolated "This can represent normal nasal    carriage.  PCR is more sensitive for identifying MRSA/MSSA carriage"    Culture - Blood (collected 07 Apr 2021 14:21)  Source: .Blood Blood-Peripheral  Final Report:    No Growth Final    Culture - Blood (collected 07 Apr 2021 14:00)  Source: .Blood Blood-Peripheral  Final Report:    No Growth Final    COVID-19 PCR: NotDetec (04-07-21 @ 15:09)  COVID-19 Yinka Domain AB Interp: Positive (04-08-21 @ 10:24)    A1C with Estimated Average Glucose Result: 5.4 % (04-07-21 @ 14:30)    RADIOLOGY:  imaging below personally reviewed    < from: CT Lower Extremity w/wo IV Cont, Left (04.12.21 @ 14:45) >  FINDINGS: There is diffuse subcutaneous soft tissue infiltration about the mid to distal aspect of the calf and along the dorsum of the foot, consistent with the history of cellulitis. There are multiple calcifications throughout the subcutaneous tissues at the level of the mid to distal calf. No well-formed collections are demonstrated. There is medial compartment predominant osteoarthritis at the knee. No acute fracture or dislocation is demonstrated. There are no areas of cortical destruction or periosteal reaction to suggest osteomyelitis. There are plantar and dorsal calcaneal enthesophytes. There are multiple varicosities in the subcutaneous tissues.  IMPRESSION: Diffuse cellulitic change, as above. No abscess or CT evidence of osteomyelitis  < end of copied text >    < from: US Duplex Venous Lower Ext Ltd, Left (04.07.21 @ 16:30) >  IMPRESSION:  No evidence of left lower extremity deep venous thrombosis.  < end of copied text >    < from: Xray Ankle Complete 3 Views, Left (04.07.21 @ 15:29) >  IMPRESSION:  Diffuse soft tissue swelling with granular dystrophic calcifications predominantly in lowercalf subcutaneous tissues.    No tracking soft tissue gas collections, radiopaque foreign bodies, or gross radiographic evidence for osteomyelitis.    No fractures, dislocations, or osseous or joint deformities.    Mild knee multicompartment osteoarthritis. Preserved ankle and visualized midfoot and hindfoot joint spaces and no joint margin erosions.    Small calcaneal enthesophytes.    No discrete lytic or blastic lesions.    < end of copied text >

## 2021-07-15 ENCOUNTER — APPOINTMENT (OUTPATIENT)
Dept: WOUND CARE | Facility: CLINIC | Age: 61
End: 2021-07-15
Payer: COMMERCIAL

## 2021-07-15 DIAGNOSIS — Z86.39 PERSONAL HISTORY OF OTHER ENDOCRINE, NUTRITIONAL AND METABOLIC DISEASE: ICD-10-CM

## 2021-07-15 PROCEDURE — 99212 OFFICE O/P EST SF 10 MIN: CPT | Mod: 95

## 2021-07-15 NOTE — PHYSICAL EXAM
[JVD] : no jugular venous distention  [Normal Breath Sounds] : Normal breath sounds [Ankle Swelling (On Exam)] : present [Ankle Swelling Bilaterally] : bilaterally  [Varicose Veins Of Lower Extremities] : bilaterally [] : bilaterally [Skin Ulcer] : no ulcer [Alert] : alert [Oriented to Person] : oriented to person [Oriented to Place] : oriented to place [Oriented to Time] : oriented to time [Calm] : calm [de-identified] : nad [de-identified] : jed [de-identified] : Left lower extremity below knee erythema resolved, skin closed [Please See PDF for [employer] Analytics] : Please See PDF for [employer].

## 2021-07-15 NOTE — HISTORY OF PRESENT ILLNESS
[Home] : at home, [unfilled] , at the time of the visit. [Medical Office: ([facility] [place])___] : at the medical office located in  [Verbal consent obtained from patient] : the patient, [unfilled] [FreeTextEntry4] : Pal NP [FreeTextEntry1] : Ms. ASHANTI FLOWER presents to the office with a redness and swelling since 4/5/2021. The wound is located on the left leg. The patient has complaints of pain. The patient says she has had fevers and chills. Was sent to the ER for further evaluation. Patient was found to have cellulitis, admitted in the hospital and completed 10 days of antibiotics post discharge. Patient states the lower extremity is extremely tender, no discharge, and no more fevers or chills. Unable to place compression stalking or anything on left lower extremity 2/2 to pain.\par \par Patient also complains of itching, which results in more pain after patient touchs her lower extremity. \par \par Finished Keflex antibiotics on 4/22/2021

## 2021-07-15 NOTE — PLAN
[FreeTextEntry1] : 7/15/21\par Plan - c/w pump 2x/day, advised to try at night especially on day spent working\par script for compression socks e mail - suggested sigvaris brand for comfort\par discussed re-scan legs venous reflux study to make sure no new issues with valves\par discussed weight management impacting on venous Insufficiency\par follow up if issues develop, want to schedule vascular testing

## 2021-07-15 NOTE — REASON FOR VISIT
[Mother] : mother [Follow-Up: _____] : a [unfilled] follow-up visit [FreeTextEntry1] : urgent telehealth leg wound

## 2021-07-21 ENCOUNTER — APPOINTMENT (OUTPATIENT)
Dept: ELECTROPHYSIOLOGY | Facility: CLINIC | Age: 61
End: 2021-07-21

## 2021-07-22 ENCOUNTER — NON-APPOINTMENT (OUTPATIENT)
Age: 61
End: 2021-07-22

## 2021-07-22 ENCOUNTER — APPOINTMENT (OUTPATIENT)
Dept: ELECTROPHYSIOLOGY | Facility: CLINIC | Age: 61
End: 2021-07-22
Payer: COMMERCIAL

## 2021-07-22 PROCEDURE — G2066: CPT

## 2021-07-22 PROCEDURE — 93298 REM INTERROG DEV EVAL SCRMS: CPT

## 2021-08-26 ENCOUNTER — NON-APPOINTMENT (OUTPATIENT)
Age: 61
End: 2021-08-26

## 2021-08-26 ENCOUNTER — APPOINTMENT (OUTPATIENT)
Dept: ELECTROPHYSIOLOGY | Facility: CLINIC | Age: 61
End: 2021-08-26
Payer: COMMERCIAL

## 2021-08-26 PROCEDURE — G2066: CPT

## 2021-08-26 PROCEDURE — 93298 REM INTERROG DEV EVAL SCRMS: CPT

## 2021-09-02 ENCOUNTER — NON-APPOINTMENT (OUTPATIENT)
Age: 61
End: 2021-09-02

## 2021-09-02 ENCOUNTER — APPOINTMENT (OUTPATIENT)
Dept: ORTHOPEDIC SURGERY | Facility: CLINIC | Age: 61
End: 2021-09-02
Payer: COMMERCIAL

## 2021-09-02 VITALS
SYSTOLIC BLOOD PRESSURE: 125 MMHG | DIASTOLIC BLOOD PRESSURE: 77 MMHG | OXYGEN SATURATION: 97 % | BODY MASS INDEX: 44.41 KG/M2 | HEIGHT: 68 IN | HEART RATE: 56 BPM | WEIGHT: 293 LBS

## 2021-09-02 DIAGNOSIS — M17.11 UNILATERAL PRIMARY OSTEOARTHRITIS, RIGHT KNEE: ICD-10-CM

## 2021-09-02 PROCEDURE — 99213 OFFICE O/P EST LOW 20 MIN: CPT

## 2021-09-02 PROCEDURE — 73564 X-RAY EXAM KNEE 4 OR MORE: CPT | Mod: RT

## 2021-09-02 NOTE — DATA REVIEWED
[de-identified] : 9/2/2021–right knee x-rays (AP, lateral, sunrise, Evans): There are no fractures, dislocations, or osseous lesions.  There are advanced tricompartmental degenerative changes.

## 2021-09-02 NOTE — DISCUSSION/SUMMARY
[de-identified] : This is a 61-year-old female with mild to moderate right knee DJD. The natural history and treatment of degenerative arthritis was discussed with the patient at length today. The spectrum of treatment including nonoperative modalities to surgical intervention was elucidated. Noninvasive and nonoperative treatment modalities include weight reduction, activity modification with low impact exercise, as needed use of acetaminophen or anti-inflammatory medications if tolerated, and physical therapy. Further treatments can include corticosteroid injection and the use of viscosupplementation with hyaluronic acid injections. Definitive surgical treatment can certainly include total joint arthroplasty also. The risks and benefits of each treatment options was discussed and all questions were answered.\par  \par Given her obesity and use of Eliquis she does have high risk of complications with operative management, therefore we will continue conservative management.  A prescription for physical therapy was provided.  Her current symptoms are related to a recent flare and have already significantly improved, therefore no injection is indicated.  Continue follow-up on a as needed basis.

## 2021-09-02 NOTE — HISTORY OF PRESENT ILLNESS
[FreeTextEntry1] : This is a 61-year-old female with a history of GERD, obesity s/p bariatric surgery X2 (with subsequent recurrence of weight gain), SIMA, A. fib (on Eliquis), PVD, LLE cellulitis, presenting for evaluation of right knee pain.  She has had this pain for several years and has had multiple prior HA injections in the past with mild relief.  She has never had a steroid injection.  She was seen in 2019 by Dr. Corona who recommended conservative management.  Today she says she has no pain at rest but does have up to 8 out of 10 pain localized to the lateral aspect of her right knee especially with prolonged walking or standing.  She generally has minimal pain with light walking.  She occasionally takes ibuprofen for relief, understanding the risks with mixing this medication with Eliquis.  She works as a nurse.

## 2021-09-02 NOTE — PHYSICAL EXAM
[FreeTextEntry1] : General: well nourished, in no acute distress, alert and oriented to person, place and time.\par Psychiatric: normal mood and affect, no abnormal movements or speech patterns.\par Eyes: vision intact without deficits, sclera and conjunctiva were normal, pupils were equal in size. \par ENT: Ears and nose were normal in appearance. No thyromegaly.\par Lymph: no enlarged nodes, no lymphedema in extremity.\par Respiratory: Normal respiratory rhythm and effort. No wheezing detected without auscultation. No shortness of breath or respiratory distress.\par Cardiac: no cardiac related leg swelling.\par Neurology: normal gross sensation in extremities to light touch.\par Abdomen: soft, non-tender, tympanic, no masses.\par \par RLE:\par \par +Obese appearing. Skin CDI w/ venous stasis changes distally. No effusion, swelling, or ecchymosis. ROM: 0-110 degrees w/o pain. No varus/valgus instability. +medial/lateral joint line TTP. No TTP over quadriceps/patellar tendon. No TTP over tibial tubercle or pes insertion. No palpable masses. No lymphedema.\par Neg Lachman. Neg Floresita's. \par Alignment: neutral\par EHL/FHL/GS/TA 5/5. S/S/SP/DP/T SILT. Toes warm, BCR. Compartments soft.

## 2021-09-30 ENCOUNTER — NON-APPOINTMENT (OUTPATIENT)
Age: 61
End: 2021-09-30

## 2021-09-30 ENCOUNTER — APPOINTMENT (OUTPATIENT)
Dept: ELECTROPHYSIOLOGY | Facility: CLINIC | Age: 61
End: 2021-09-30
Payer: COMMERCIAL

## 2021-09-30 PROCEDURE — 93298 REM INTERROG DEV EVAL SCRMS: CPT

## 2021-09-30 PROCEDURE — G2066: CPT

## 2021-11-04 ENCOUNTER — APPOINTMENT (OUTPATIENT)
Dept: ELECTROPHYSIOLOGY | Facility: CLINIC | Age: 61
End: 2021-11-04
Payer: COMMERCIAL

## 2021-11-04 ENCOUNTER — NON-APPOINTMENT (OUTPATIENT)
Age: 61
End: 2021-11-04

## 2021-11-04 PROCEDURE — G2066: CPT

## 2021-11-04 PROCEDURE — 93298 REM INTERROG DEV EVAL SCRMS: CPT

## 2021-11-10 ENCOUNTER — APPOINTMENT (OUTPATIENT)
Dept: CARDIOLOGY | Facility: CLINIC | Age: 61
End: 2021-11-10

## 2021-11-29 NOTE — PRE-OP CHECKLIST - ORDERS/MEDICATION ADMINISTRATION RECORD ON CHART
Please see below.   Spoke to Dr. Tam Oneill re: discharge order. States he would like repeat ortho BPs prior to DC. Ortho BP results sent to  via [facility], awaiting response. done

## 2021-12-09 ENCOUNTER — APPOINTMENT (OUTPATIENT)
Dept: ELECTROPHYSIOLOGY | Facility: CLINIC | Age: 61
End: 2021-12-09
Payer: COMMERCIAL

## 2021-12-09 ENCOUNTER — NON-APPOINTMENT (OUTPATIENT)
Age: 61
End: 2021-12-09

## 2021-12-09 PROCEDURE — 93298 REM INTERROG DEV EVAL SCRMS: CPT

## 2021-12-09 PROCEDURE — G2066: CPT

## 2021-12-14 ENCOUNTER — RX RENEWAL (OUTPATIENT)
Age: 61
End: 2021-12-14

## 2021-12-21 ENCOUNTER — RESULT REVIEW (OUTPATIENT)
Age: 61
End: 2021-12-21

## 2021-12-21 ENCOUNTER — APPOINTMENT (OUTPATIENT)
Dept: PRIMARY CARE | Facility: HOSPITAL | Age: 61
End: 2021-12-21

## 2021-12-21 ENCOUNTER — OUTPATIENT (OUTPATIENT)
Dept: OUTPATIENT SERVICES | Facility: HOSPITAL | Age: 61
LOS: 1 days | End: 2021-12-21

## 2021-12-21 ENCOUNTER — LABORATORY RESULT (OUTPATIENT)
Age: 61
End: 2021-12-21

## 2021-12-21 VITALS
SYSTOLIC BLOOD PRESSURE: 124 MMHG | BODY MASS INDEX: 44.41 KG/M2 | HEART RATE: 83 BPM | OXYGEN SATURATION: 100 % | HEIGHT: 68 IN | WEIGHT: 293 LBS | DIASTOLIC BLOOD PRESSURE: 78 MMHG

## 2021-12-21 VITALS — TEMPERATURE: 97.7 F

## 2021-12-21 DIAGNOSIS — R10.814 LEFT LOWER QUADRANT ABDOMINAL TENDERNESS: ICD-10-CM

## 2021-12-21 DIAGNOSIS — Z98.84 BARIATRIC SURGERY STATUS: Chronic | ICD-10-CM

## 2021-12-21 DIAGNOSIS — Z95.818 PRESENCE OF OTHER CARDIAC IMPLANTS AND GRAFTS: Chronic | ICD-10-CM

## 2021-12-22 ENCOUNTER — OUTPATIENT (OUTPATIENT)
Dept: OUTPATIENT SERVICES | Facility: HOSPITAL | Age: 61
LOS: 1 days | End: 2021-12-22
Payer: COMMERCIAL

## 2021-12-22 ENCOUNTER — APPOINTMENT (OUTPATIENT)
Dept: CT IMAGING | Facility: CLINIC | Age: 61
End: 2021-12-22
Payer: COMMERCIAL

## 2021-12-22 DIAGNOSIS — Z95.818 PRESENCE OF OTHER CARDIAC IMPLANTS AND GRAFTS: Chronic | ICD-10-CM

## 2021-12-22 DIAGNOSIS — R10.814 LEFT LOWER QUADRANT ABDOMINAL TENDERNESS: ICD-10-CM

## 2021-12-22 DIAGNOSIS — Z98.84 BARIATRIC SURGERY STATUS: Chronic | ICD-10-CM

## 2021-12-22 LAB
ALBUMIN SERPL ELPH-MCNC: 4.3 G/DL
ALP BLD-CCNC: 102 U/L
ALT SERPL-CCNC: 11 U/L
AMYLASE/CREAT SERPL: 70 U/L
ANION GAP SERPL CALC-SCNC: 14 MMOL/L
AST SERPL-CCNC: 20 U/L
BILIRUB DIRECT SERPL-MCNC: 0.1 MG/DL
BILIRUB INDIRECT SERPL-MCNC: 0.2 MG/DL
BILIRUB SERPL-MCNC: 0.3 MG/DL
BUN SERPL-MCNC: 17 MG/DL
CALCIUM SERPL-MCNC: 9.9 MG/DL
CHLORIDE SERPL-SCNC: 104 MMOL/L
CHOLEST SERPL-MCNC: 222 MG/DL
CO2 SERPL-SCNC: 22 MMOL/L
CREAT SERPL-MCNC: 0.76 MG/DL
GLUCOSE SERPL-MCNC: 92 MG/DL
HDLC SERPL-MCNC: 70 MG/DL
LDLC SERPL CALC-MCNC: 132 MG/DL
LPL SERPL-CCNC: 40 U/L
NONHDLC SERPL-MCNC: 151 MG/DL
POTASSIUM SERPL-SCNC: 5 MMOL/L
PROT SERPL-MCNC: 7.8 G/DL
SODIUM SERPL-SCNC: 140 MMOL/L
TRIGL SERPL-MCNC: 97 MG/DL

## 2021-12-22 PROCEDURE — 74177 CT ABD & PELVIS W/CONTRAST: CPT | Mod: 26

## 2021-12-22 PROCEDURE — 74177 CT ABD & PELVIS W/CONTRAST: CPT

## 2021-12-22 PROCEDURE — 82565 ASSAY OF CREATININE: CPT

## 2021-12-23 NOTE — HISTORY OF PRESENT ILLNESS
[FreeTextEntry8] : 61 F PCN allergy, PMH of HTN and no PSH [facility] for LLQ abdominal pain. \par \par States that  she has LLQ abdominal  pain for the past week. Described as 5-6/10 pain scale, throbbing and squeezing pain radiating to the back. She denies nausea, vomiting and diarrhea. She has history of + Diverticuli 3 -5 years ago. She had colonoscopy done 3 years with no polyps. She has history of bariatric surgery. Denies any blood on stool, hematuria and fever. \par \par With regards to COVID ,she has been fully vaccinated since January 2021. She also received COVID booster and flu shot. Denies any cough, sore throat or SOB. No loss of smell or taste, no chest tightness, or any GI related symptoms of nausea, vomiting or diarrhea. \par \par She works  in [employer]. She takes regular maintenance medications. Denies any chest pain, no chest palpitations or any respiratory distress\par \par

## 2021-12-23 NOTE — PHYSICAL EXAM
[No Acute Distress] : no acute distress [Normal Sclera/Conjunctiva] : normal sclera/conjunctiva [PERRL] : pupils equal round and reactive to light [EOMI] : extraocular movements intact [Normal Outer Ear/Nose] : the outer ears and nose were normal in appearance [No Respiratory Distress] : no respiratory distress  [No Accessory Muscle Use] : no accessory muscle use [Clear to Auscultation] : lungs were clear to auscultation bilaterally [Normal Rate] : normal rate  [Regular Rhythm] : with a regular rhythm [Normal S1, S2] : normal S1 and S2 [No Murmur] : no murmur heard [Non-distended] : non-distended [No Masses] : no abdominal mass palpated [No Focal Deficits] : no focal deficits [Normal Gait] : normal gait [Normal Affect] : the affect was normal [Normal Insight/Judgement] : insight and judgment were intact [de-identified] : no tonsular exudates, no tonsular erythem and no maxillary tenderness  [de-identified] : no wheezing, no rhonchi and no crackles  [de-identified] : no epigastric tenderness, Negative Moe, Negative Mcburneys point, LLQ abodminal tenderness, No rebound tenderness, no rigidity

## 2022-01-03 ENCOUNTER — RESULT CHARGE (OUTPATIENT)
Age: 62
End: 2022-01-03

## 2022-01-04 ENCOUNTER — NON-APPOINTMENT (OUTPATIENT)
Age: 62
End: 2022-01-04

## 2022-01-04 ENCOUNTER — APPOINTMENT (OUTPATIENT)
Dept: CARDIOLOGY | Facility: CLINIC | Age: 62
End: 2022-01-04
Payer: COMMERCIAL

## 2022-01-04 VITALS
HEART RATE: 87 BPM | HEIGHT: 68 IN | BODY MASS INDEX: 44.41 KG/M2 | DIASTOLIC BLOOD PRESSURE: 78 MMHG | WEIGHT: 293 LBS | SYSTOLIC BLOOD PRESSURE: 116 MMHG | TEMPERATURE: 97.7 F | OXYGEN SATURATION: 97 %

## 2022-01-04 DIAGNOSIS — Z13.6 ENCOUNTER FOR SCREENING FOR CARDIOVASCULAR DISORDERS: ICD-10-CM

## 2022-01-04 DIAGNOSIS — L30.9 DERMATITIS, UNSPECIFIED: ICD-10-CM

## 2022-01-04 DIAGNOSIS — K57.32 DIVERTICULITIS OF LARGE INTESTINE W/OUT PERFORATION OR ABSCESS W/OUT BLEEDING: ICD-10-CM

## 2022-01-04 DIAGNOSIS — R10.814 LEFT LOWER QUADRANT ABDOMINAL TENDERNESS: ICD-10-CM

## 2022-01-04 DIAGNOSIS — K57.32 DIVERTICULITIS OF LARGE INTESTINE WITHOUT PERFORATION OR ABSCESS WITHOUT BLEEDING: ICD-10-CM

## 2022-01-04 DIAGNOSIS — L81.2 FRECKLES: ICD-10-CM

## 2022-01-04 DIAGNOSIS — R06.00 DYSPNEA, UNSPECIFIED: ICD-10-CM

## 2022-01-04 PROCEDURE — 93000 ELECTROCARDIOGRAM COMPLETE: CPT

## 2022-01-04 PROCEDURE — 99386 PREV VISIT NEW AGE 40-64: CPT | Mod: 25

## 2022-01-04 RX ORDER — OXYCODONE AND ACETAMINOPHEN 5; 325 MG/1; MG/1
5-325 TABLET ORAL EVERY 6 HOURS
Qty: 90 | Refills: 0 | Status: DISCONTINUED | COMMUNITY
Start: 2021-04-22 | End: 2022-01-04

## 2022-01-04 RX ORDER — CEPHALEXIN 500 MG/1
500 TABLET ORAL 4 TIMES DAILY
Qty: 28 | Refills: 0 | Status: DISCONTINUED | COMMUNITY
Start: 2021-04-23 | End: 2022-01-04

## 2022-01-04 RX ORDER — ORLISTAT 60 MG/1
60 CAPSULE ORAL 3 TIMES DAILY
Qty: 90 | Refills: 2 | Status: DISCONTINUED | COMMUNITY
Start: 2021-03-26 | End: 2022-01-04

## 2022-01-04 NOTE — HISTORY OF PRESENT ILLNESS
[FreeTextEntry1] : Here to establish primary care and for annual physical exam.  [de-identified] : This is a 62yo female with PMH of PVD, PAF, SIMA, OA, Eczema, obesity, and  GERD who presents today for her annual physical exam. She states she has no issues or concerns for today. Endorses occasional palpitations. has loop recorder in place.Patient denies chest pain , dizziness, vision changes, n/v, , changes in bowel/bladder habits,  or appetite. Patient states that she is also had a recent  diverticulitis episodes, s/p abx treatment. Feeling much better, abdominal pain has improved. pt with ocassional dyspnea

## 2022-01-04 NOTE — PHYSICAL EXAM
[No Acute Distress] : no acute distress [Well Nourished] : well nourished [Well Developed] : well developed [Well-Appearing] : well-appearing [Normal Sclera/Conjunctiva] : normal sclera/conjunctiva [PERRL] : pupils equal round and reactive to light [EOMI] : extraocular movements intact [Normal Outer Ear/Nose] : the outer ears and nose were normal in appearance [Normal Oropharynx] : the oropharynx was normal [No JVD] : no jugular venous distention [No Lymphadenopathy] : no lymphadenopathy [Supple] : supple [Thyroid Normal, No Nodules] : the thyroid was normal and there were no nodules present [No Respiratory Distress] : no respiratory distress  [No Accessory Muscle Use] : no accessory muscle use [Clear to Auscultation] : lungs were clear to auscultation bilaterally [Normal Rate] : normal rate  [Regular Rhythm] : with a regular rhythm [Normal S1, S2] : normal S1 and S2 [No Murmur] : no murmur heard [No Carotid Bruits] : no carotid bruits [No Abdominal Bruit] : a ~M bruit was not heard ~T in the abdomen [No Varicosities] : no varicosities [Pedal Pulses Present] : the pedal pulses are present [No Edema] : there was no peripheral edema [No Palpable Aorta] : no palpable aorta [No Extremity Clubbing/Cyanosis] : no extremity clubbing/cyanosis [Soft] : abdomen soft [Non Tender] : non-tender [Non-distended] : non-distended [No Masses] : no abdominal mass palpated [No HSM] : no HSM [Normal Bowel Sounds] : normal bowel sounds [Normal Posterior Cervical Nodes] : no posterior cervical lymphadenopathy [Normal Anterior Cervical Nodes] : no anterior cervical lymphadenopathy [No CVA Tenderness] : no CVA  tenderness [No Spinal Tenderness] : no spinal tenderness [No Joint Swelling] : no joint swelling [Grossly Normal Strength/Tone] : grossly normal strength/tone [No Rash] : no rash [Coordination Grossly Intact] : coordination grossly intact [No Focal Deficits] : no focal deficits [Normal Gait] : normal gait [Deep Tendon Reflexes (DTR)] : deep tendon reflexes were 2+ and symmetric [Normal Affect] : the affect was normal [Normal Insight/Judgement] : insight and judgment were intact [de-identified] : venous stasis  [de-identified] : [email or web address]

## 2022-01-13 ENCOUNTER — APPOINTMENT (OUTPATIENT)
Dept: ELECTROPHYSIOLOGY | Facility: CLINIC | Age: 62
End: 2022-01-13
Payer: COMMERCIAL

## 2022-01-13 ENCOUNTER — NON-APPOINTMENT (OUTPATIENT)
Age: 62
End: 2022-01-13

## 2022-01-13 PROCEDURE — G2066: CPT

## 2022-01-13 PROCEDURE — 93298 REM INTERROG DEV EVAL SCRMS: CPT

## 2022-01-19 ENCOUNTER — NON-APPOINTMENT (OUTPATIENT)
Age: 62
End: 2022-01-19

## 2022-01-19 LAB
25(OH)D3 SERPL-MCNC: 33.6 NG/ML
ALBUMIN SERPL ELPH-MCNC: 4.3 G/DL
ALP BLD-CCNC: 85 U/L
ALT SERPL-CCNC: 25 U/L
ANION GAP SERPL CALC-SCNC: 11 MMOL/L
AST SERPL-CCNC: 25 U/L
BILIRUB SERPL-MCNC: 0.2 MG/DL
BUN SERPL-MCNC: 12 MG/DL
CALCIUM SERPL-MCNC: 9.6 MG/DL
CHLORIDE SERPL-SCNC: 104 MMOL/L
CHOLEST SERPL-MCNC: 208 MG/DL
CO2 SERPL-SCNC: 24 MMOL/L
CREAT SERPL-MCNC: 0.66 MG/DL
ESTIMATED AVERAGE GLUCOSE: 108 MG/DL
FERRITIN SERPL-MCNC: 119 NG/ML
FOLATE SERPL-MCNC: 14.4 NG/ML
GLUCOSE SERPL-MCNC: 92 MG/DL
HAPTOGLOB SERPL-MCNC: 182 MG/DL
HBA1C MFR BLD HPLC: 5.4 %
HDLC SERPL-MCNC: 57 MG/DL
IRON SERPL-MCNC: 59 UG/DL
LDH SERPL-CCNC: 290 U/L
LDLC SERPL CALC-MCNC: 130 MG/DL
NONHDLC SERPL-MCNC: 151 MG/DL
POTASSIUM SERPL-SCNC: 4.5 MMOL/L
PROT SERPL-MCNC: 8 G/DL
SODIUM SERPL-SCNC: 139 MMOL/L
T4 FREE SERPL-MCNC: 1.1 NG/DL
TRANSFERRIN SERPL-MCNC: 244 MG/DL
TRIGL SERPL-MCNC: 104 MG/DL
TSH SERPL-ACNC: 1.26 UIU/ML
VIT B12 SERPL-MCNC: 593 PG/ML

## 2022-01-20 ENCOUNTER — OUTPATIENT (OUTPATIENT)
Dept: OUTPATIENT SERVICES | Facility: HOSPITAL | Age: 62
LOS: 1 days | End: 2022-01-20
Payer: COMMERCIAL

## 2022-01-20 ENCOUNTER — APPOINTMENT (OUTPATIENT)
Dept: RADIOLOGY | Facility: IMAGING CENTER | Age: 62
End: 2022-01-20
Payer: COMMERCIAL

## 2022-01-20 DIAGNOSIS — R06.00 DYSPNEA, UNSPECIFIED: ICD-10-CM

## 2022-01-20 DIAGNOSIS — Z98.84 BARIATRIC SURGERY STATUS: Chronic | ICD-10-CM

## 2022-01-20 DIAGNOSIS — Z95.818 PRESENCE OF OTHER CARDIAC IMPLANTS AND GRAFTS: Chronic | ICD-10-CM

## 2022-01-20 PROCEDURE — 71046 X-RAY EXAM CHEST 2 VIEWS: CPT

## 2022-01-20 PROCEDURE — 71046 X-RAY EXAM CHEST 2 VIEWS: CPT | Mod: 26

## 2022-02-10 ENCOUNTER — OUTPATIENT (OUTPATIENT)
Dept: OUTPATIENT SERVICES | Facility: HOSPITAL | Age: 62
LOS: 1 days | End: 2022-02-10
Payer: SELF-PAY

## 2022-02-10 ENCOUNTER — APPOINTMENT (OUTPATIENT)
Dept: CT IMAGING | Facility: CLINIC | Age: 62
End: 2022-02-10
Payer: SELF-PAY

## 2022-02-10 ENCOUNTER — APPOINTMENT (OUTPATIENT)
Dept: RADIOLOGY | Facility: CLINIC | Age: 62
End: 2022-02-10

## 2022-02-10 ENCOUNTER — APPOINTMENT (OUTPATIENT)
Dept: RADIOLOGY | Facility: CLINIC | Age: 62
End: 2022-02-10
Payer: SELF-PAY

## 2022-02-10 DIAGNOSIS — Z98.84 BARIATRIC SURGERY STATUS: Chronic | ICD-10-CM

## 2022-02-10 DIAGNOSIS — E78.5 HYPERLIPIDEMIA, UNSPECIFIED: ICD-10-CM

## 2022-02-10 DIAGNOSIS — Z95.818 PRESENCE OF OTHER CARDIAC IMPLANTS AND GRAFTS: Chronic | ICD-10-CM

## 2022-02-10 PROCEDURE — 75571 CT HRT W/O DYE W/CA TEST: CPT | Mod: 26

## 2022-02-10 PROCEDURE — 75571 CT HRT W/O DYE W/CA TEST: CPT

## 2022-02-17 ENCOUNTER — NON-APPOINTMENT (OUTPATIENT)
Age: 62
End: 2022-02-17

## 2022-02-17 ENCOUNTER — APPOINTMENT (OUTPATIENT)
Dept: ELECTROPHYSIOLOGY | Facility: CLINIC | Age: 62
End: 2022-02-17
Payer: COMMERCIAL

## 2022-02-17 PROCEDURE — G2066: CPT

## 2022-02-17 PROCEDURE — 93298 REM INTERROG DEV EVAL SCRMS: CPT

## 2022-02-18 ENCOUNTER — NON-APPOINTMENT (OUTPATIENT)
Age: 62
End: 2022-02-18

## 2022-02-24 ENCOUNTER — APPOINTMENT (OUTPATIENT)
Dept: CV DIAGNOSTICS | Facility: HOSPITAL | Age: 62
End: 2022-02-24
Payer: COMMERCIAL

## 2022-02-24 ENCOUNTER — APPOINTMENT (OUTPATIENT)
Dept: CV DIAGNOSITCS | Facility: HOSPITAL | Age: 62
End: 2022-02-24
Payer: COMMERCIAL

## 2022-02-24 ENCOUNTER — OUTPATIENT (OUTPATIENT)
Dept: OUTPATIENT SERVICES | Facility: HOSPITAL | Age: 62
LOS: 1 days | End: 2022-02-24

## 2022-02-24 DIAGNOSIS — Z95.818 PRESENCE OF OTHER CARDIAC IMPLANTS AND GRAFTS: Chronic | ICD-10-CM

## 2022-02-24 DIAGNOSIS — Z98.84 BARIATRIC SURGERY STATUS: Chronic | ICD-10-CM

## 2022-02-24 DIAGNOSIS — R06.00 DYSPNEA, UNSPECIFIED: ICD-10-CM

## 2022-02-24 PROCEDURE — 93306 TTE W/DOPPLER COMPLETE: CPT | Mod: 26

## 2022-03-10 ENCOUNTER — APPOINTMENT (OUTPATIENT)
Dept: ELECTROPHYSIOLOGY | Facility: CLINIC | Age: 62
End: 2022-03-10
Payer: COMMERCIAL

## 2022-03-10 ENCOUNTER — APPOINTMENT (OUTPATIENT)
Dept: CARDIOLOGY | Facility: CLINIC | Age: 62
End: 2022-03-10
Payer: COMMERCIAL

## 2022-03-10 ENCOUNTER — NON-APPOINTMENT (OUTPATIENT)
Age: 62
End: 2022-03-10

## 2022-03-10 VITALS
HEART RATE: 52 BPM | BODY MASS INDEX: 35.61 KG/M2 | DIASTOLIC BLOOD PRESSURE: 81 MMHG | TEMPERATURE: 97.8 F | WEIGHT: 235 LBS | SYSTOLIC BLOOD PRESSURE: 135 MMHG | HEIGHT: 68 IN | OXYGEN SATURATION: 98 %

## 2022-03-10 PROCEDURE — 93000 ELECTROCARDIOGRAM COMPLETE: CPT

## 2022-03-10 PROCEDURE — 99214 OFFICE O/P EST MOD 30 MIN: CPT

## 2022-03-10 PROCEDURE — 93285 PRGRMG DEV EVAL SCRMS IP: CPT

## 2022-03-10 RX ORDER — CIPROFLOXACIN HYDROCHLORIDE 500 MG/1
500 TABLET, FILM COATED ORAL TWICE DAILY
Qty: 20 | Refills: 0 | Status: DISCONTINUED | COMMUNITY
Start: 2021-12-21 | End: 2022-03-10

## 2022-03-10 RX ORDER — DOXYCYCLINE 100 MG/1
100 TABLET, FILM COATED ORAL
Qty: 14 | Refills: 0 | Status: DISCONTINUED | COMMUNITY
Start: 2022-01-19 | End: 2022-03-10

## 2022-03-10 RX ORDER — METHYLPREDNISOLONE 4 MG/1
4 TABLET ORAL
Qty: 1 | Refills: 1 | Status: DISCONTINUED | COMMUNITY
Start: 2022-01-19 | End: 2022-03-10

## 2022-03-10 NOTE — HISTORY OF PRESENT ILLNESS
[FreeTextEntry1] : Last seen in office in 2018 after lap band surgery. Since that time has has Cor CT and Echo, both of which results reviewed and normal\par Known history of AF- on Eliquis and Metoprolol\par \par #Dyslipidemia- Lipids checked in Jan with  and , not on statin at this time, will continue to monitor\par Coronary CT with Calcium score of 0\par Echo normal\par #PAF- Patient with known PAF, ILR in place and on Metoprolol/Eliquis\par Rates controlled\par Followed by Dr. Bojorquez, will followup\par Continue Eliquis\par \par

## 2022-03-10 NOTE — DISCUSSION/SUMMARY
[FreeTextEntry1] : 61 year old woman with history of PAF here for followup.\par \par #Dyslipidemia- Lipids checked in Jan with  and , not on statin at this time, will continue to monitor\par Coronary CT with Calcium score of 0\par Echo normal\par #PAF- Patient with known PAF, ILR in place and on Metoprolol/Eliquis\par Rates controlled\par Followed by Dr. Bojorquez, will followup\par Continue Eliquis

## 2022-03-22 ENCOUNTER — APPOINTMENT (OUTPATIENT)
Dept: CARDIOLOGY | Facility: CLINIC | Age: 62
End: 2022-03-22
Payer: COMMERCIAL

## 2022-03-22 PROCEDURE — A9500: CPT

## 2022-03-22 PROCEDURE — 78452 HT MUSCLE IMAGE SPECT MULT: CPT

## 2022-03-22 PROCEDURE — 93015 CV STRESS TEST SUPVJ I&R: CPT

## 2022-03-22 RX ORDER — REGADENOSON 0.08 MG/ML
0.4 INJECTION, SOLUTION INTRAVENOUS
Qty: 1 | Refills: 0 | Status: COMPLETED | OUTPATIENT
Start: 2022-03-22

## 2022-03-22 RX ADMIN — REGADENOSON 5 MG/5ML: 0.08 INJECTION, SOLUTION INTRAVENOUS at 00:00

## 2022-03-23 ENCOUNTER — NON-APPOINTMENT (OUTPATIENT)
Age: 62
End: 2022-03-23

## 2022-04-07 ENCOUNTER — NON-APPOINTMENT (OUTPATIENT)
Age: 62
End: 2022-04-07

## 2022-04-11 ENCOUNTER — NON-APPOINTMENT (OUTPATIENT)
Age: 62
End: 2022-04-11

## 2022-04-11 ENCOUNTER — APPOINTMENT (OUTPATIENT)
Dept: ELECTROPHYSIOLOGY | Facility: CLINIC | Age: 62
End: 2022-04-11
Payer: COMMERCIAL

## 2022-04-11 PROCEDURE — G2066: CPT

## 2022-04-11 PROCEDURE — 93298 REM INTERROG DEV EVAL SCRMS: CPT

## 2022-04-25 ENCOUNTER — NON-APPOINTMENT (OUTPATIENT)
Age: 62
End: 2022-04-25

## 2022-05-16 ENCOUNTER — APPOINTMENT (OUTPATIENT)
Dept: ELECTROPHYSIOLOGY | Facility: CLINIC | Age: 62
End: 2022-05-16
Payer: COMMERCIAL

## 2022-05-16 ENCOUNTER — NON-APPOINTMENT (OUTPATIENT)
Age: 62
End: 2022-05-16

## 2022-05-16 PROCEDURE — 93298 REM INTERROG DEV EVAL SCRMS: CPT

## 2022-05-16 PROCEDURE — G2066: CPT

## 2022-05-27 NOTE — H&P PST ADULT - CARDIOVASCULAR DETAILS
bradycardia/positive S2/positive S1 Cheek Interpolation Flap Division And Inset Text: Division and inset of the cheek interpolation flap was performed to achieve optimal aesthetic result, restore normal anatomic appearance and avoid distortion of normal anatomy, expedite and facilitate wound healing, achieve optimal functional result and because linear closure either not possible or would produce suboptimal result. The patient was prepped and draped in the usual manner. The pedicle was infiltrated with local anesthesia. The pedicle was sectioned with a #15 blade. The pedicle was de-bulked and trimmed to match the shape of the defect. Hemostasis was achieved. The flap donor site and free margin of the flap were secured with deep buried sutures and the wound edges were re-approximated.

## 2022-06-20 ENCOUNTER — NON-APPOINTMENT (OUTPATIENT)
Age: 62
End: 2022-06-20

## 2022-06-20 ENCOUNTER — APPOINTMENT (OUTPATIENT)
Dept: ELECTROPHYSIOLOGY | Facility: CLINIC | Age: 62
End: 2022-06-20
Payer: COMMERCIAL

## 2022-06-20 PROCEDURE — 93298 REM INTERROG DEV EVAL SCRMS: CPT

## 2022-06-20 PROCEDURE — G2066: CPT

## 2022-07-05 ENCOUNTER — APPOINTMENT (OUTPATIENT)
Dept: ELECTROPHYSIOLOGY | Facility: CLINIC | Age: 62
End: 2022-07-05

## 2022-07-07 NOTE — PHYSICAL EXAM
Physical Therapy    Physical Therapy Daily Treatment Note    Date:  2022    Patient Name:  Michael Lassiter    :  1947  MRN: [id number]  Restrictions/Precautions:     Medical/Treatment Diagnosis Information:   · Diagnosis: M25.511, G89.29, M25.512 (ICD-10-CM) - Chronic pain of both shoulders     Insurance/Certification information:  PT Insurance Information: Primary: [facility] medicare, Secondary: Medicaid  Physician Information:    Diana Acuña PA-C  Plan of care signed (Y/N):  Y  Visit# / total visits:  3/8  Pain level: 0/10 with no movement        Time In: 9:04   Time Out: 9:50    Progress Note: []  Yes  [x]  No  Next due by: Visit #10 or by 22    Subjective:  Pt reports has no pain when not doing anything. Pt gets most pain when doing abduction with B UEs. Pt reports tries to do stretching and exercising at home. Objective: ANGIE performed per flow sheet to increase strength and mobility for ease with daily tasks. Exercises initiated this date with good tolerance. Verbal cueing required throughout for proper technique. Pt notes increased discomfort at end of treatment. Pt encouraged to monitor response to PT session. HEP given with red TBand.  Much time spent on education    Observation:  R UE more painful and demos more weakness vs L UE  Test measurements:       Exercises:   Exercise/Equipment Resistance/Repetitions Other comments   Cervical retraction 15x    Scapular retraction 15x         Prone cuff series 10x ea BUE   Wand AROM 10x         TB rows/ext 15x Red    TB 6 way shoulder  10x Red BUE        Doorway stretch  3x20\"    IR stretch  10x BUE   Supine pec stretch 1'              [x] Provided verbal/tactile cueing for activities related to strengthening, flexibility, endurance, ROM. (17056)  [] Provided verbal/tactile cueing for activities related to improving balance, coordination, kinesthetic sense, posture, motor skill, proprioception. (52128)    Therapeutic Activities:     [] Therapeutic activities, direct (one-on-one) patient contact (use of dynamic activities to improve functional performance). (98312)    Gait:   [] Provided training and instruction to the patient for ambulation re-education. (43519)    Self-Care/ADL's  [] Self-care/home management training and compensatory training, meal preparation, safety procedures, and instructions in use of assistive technology devices/adaptive equipment, direct one-on-one contact. (82314)    Home Exercise Program:     [] Reviewed/Progressed HEP activities related to strengthening, flexibility, endurance, ROM. (09068)  [] Reviewed/Progressed HEP activities related to improving balance, coordination, kinesthetic sense, posture, motor skill, proprioception.  (40473)    Manual Treatments:    [] Provided manual therapy to mobilize soft tissue/joints for the purpose of modulating pain, promoting relaxation,  increasing ROM, reducing/eliminating soft tissue swelling/inflammation/restriction, improving soft tissue extensibility. (15009)    Service Based Modalities:      Timed Code Treatment Minutes:   55' Therex/HEP    Total Treatment Minutes:   55'    Treatment/Activity Tolerance:  [x] Patient tolerated treatment well [] Patient limited by fatigue  [] Patient limited by pain  [] Patient limited by other medical complications  [] Other:     Prognosis: [x] Good [] Fair  [] Poor    Patient Requires Follow-up: [x] Yes  [] No      Goals:  Short Term Goals  Time Frame for Short term goals: 2 weeks  Short term goal 1: Initiate HEP    Long Term Goals  Time Frame for Long term goals : 4 weeks  Long term goal 1: Patient will report no greater than 1/10 bilateral shoulder pain to allow patient to get a full nights rest.  Long term goal 2: Patient will demonstrate full pain free bilateral shoulder AROM to allow patient to reach overhead with greater ease.   Long term goal 3: Patient will demonstrate 5/5 bilateral shoulder strength to allow patient to carry items with greater ease.  Long term goal 4: Patient will score 0% disability on the Quick DASH to allow patient to complete her daily ADLs with greater ease. Long term goal 5: Patient will verbalize independence with her HEP for continued progress following therapy.       Plan:   [x] Continue per plan of care [] Alter current plan (see comments)  [] Plan of care initiated [] Hold pending MD visit [] Discharge    Plan for Next Session:  HEP, Progress per pt tolerance    Electronically signed by:  Alisson Umaña PTA [Antalgic] : antalgic [LE] : Sensory: Intact in bilateral lower extremities [Knee] : patellar 2+ and symmetric bilaterally [DP] : dorsalis pedis 2+ and symmetric bilaterally [Ankle] : ankle 2+ and symmetric bilaterally [PT] : posterior tibial 2+ and symmetric bilaterally [de-identified] : On general examination the patient is adequately groomed and nourished. The vital parameters are as recorded. \par There is no lymphedema or diffuse swelling, no varicose veins, no skin warmth/erythema/scars/swelling, no ulcers and no palpable lymph nodes or masses in both lower extremities. Bilateral pedal pulses are well palpable.\par Upper Extremity:\par Both right and left upper extremities are unremarkable in terms of skin rash, lesions, pigmentation, redness, tenderness, swelling, joint instability, abnormal deformity or crepitus. The overall range of motion, sensation, motor tone and strength testing are normal.\par \par Knee Exam:\par The gait is right stiff knee antalgic.\par Knee alignment:            Right 5 degrees valgus with mild flexion deformity. \par Left 5 degrees valgus with no flexion deformity.\par Both knees demonstrate no scars and the skin has no warmth, erythema, swelling or tenderness. \par Both knees have a range of motion of\par Extension:                    Right -10 degrees                        Left 0 degrees\par Flexion:                                   Right 100 degrees          Left 125 degrees\par Right Knee: There is medial joint line tenderness. There is mild effusion. \par Floresita's test is positive. Tracy test is positive.\par Lachman's test, Anterior/Posterior Drawer test and Pivot Shift Tests are negative. \par There is right knee grade 1 MCL mediolateral laxity and no anteroposterior instability. \par Patella compression test is negative and patellofemoral tracking is normal with no lateral subluxation, apprehension or instability. \par Right knee quadriceps and hamstrings power is 4+.\par Left knee quadriceps and hamstrings power is 5.\par \par Hip Exam:\par The gait and station is normal\par The patient has equal leg lengths and no pelvic tilt. Rodolfo/Dong test is 7 inches on the right and 7 inches on the left. Active SLR is 60 degrees on the right and 60 degrees on the left. Both hips demonstrate no scars and the skin has no signs of inflammation or tenderness. \par Both Hips have a normal range of motion of flexion to 100 degrees, abduction 40 degrees, adduction 20 degrees, external rotation 40 degrees, internal rotation 20 degrees with symmetrical motion in flexion and extension. There is no flexion contracture, deformity or instability. Labral impingement tests are negative.\par Both hips flexor, abductor and extensor power is normal.  [de-identified] : The following radiographs were ordered and read by me during this patients visit. I reviewed each radiograph in detail with the patient and discussed the findings as highlighted below. \par AP, lateral and skyline views of the right knee confirms advanced degenerative joint disease with medial joint space narrowing and osteophyte formation. Valgus Deformity. \par AP view of the pelvis are within normal limits

## 2022-07-18 ENCOUNTER — NON-APPOINTMENT (OUTPATIENT)
Age: 62
End: 2022-07-18

## 2022-07-19 ENCOUNTER — TRANSCRIPTION ENCOUNTER (OUTPATIENT)
Age: 62
End: 2022-07-19

## 2022-07-20 ENCOUNTER — NON-APPOINTMENT (OUTPATIENT)
Age: 62
End: 2022-07-20

## 2022-07-22 ENCOUNTER — APPOINTMENT (OUTPATIENT)
Dept: DISASTER EMERGENCY | Facility: HOSPITAL | Age: 62
End: 2022-07-22

## 2022-07-22 ENCOUNTER — NON-APPOINTMENT (OUTPATIENT)
Age: 62
End: 2022-07-22

## 2022-07-22 ENCOUNTER — TRANSCRIPTION ENCOUNTER (OUTPATIENT)
Age: 62
End: 2022-07-22

## 2022-07-22 ENCOUNTER — OUTPATIENT (OUTPATIENT)
Dept: OUTPATIENT SERVICES | Facility: HOSPITAL | Age: 62
LOS: 1 days | End: 2022-07-22

## 2022-07-22 VITALS
SYSTOLIC BLOOD PRESSURE: 133 MMHG | RESPIRATION RATE: 18 BRPM | HEIGHT: 68 IN | DIASTOLIC BLOOD PRESSURE: 76 MMHG | HEART RATE: 67 BPM | OXYGEN SATURATION: 97 % | TEMPERATURE: 98 F | WEIGHT: 293 LBS

## 2022-07-22 VITALS
HEART RATE: 56 BPM | SYSTOLIC BLOOD PRESSURE: 113 MMHG | RESPIRATION RATE: 17 BRPM | TEMPERATURE: 98 F | DIASTOLIC BLOOD PRESSURE: 69 MMHG | OXYGEN SATURATION: 97 %

## 2022-07-22 DIAGNOSIS — Z98.84 BARIATRIC SURGERY STATUS: Chronic | ICD-10-CM

## 2022-07-22 DIAGNOSIS — U07.1 COVID-19: ICD-10-CM

## 2022-07-22 DIAGNOSIS — Z95.818 PRESENCE OF OTHER CARDIAC IMPLANTS AND GRAFTS: Chronic | ICD-10-CM

## 2022-07-22 RX ORDER — BEBTELOVIMAB 87.5 MG/ML
175 INJECTION, SOLUTION INTRAVENOUS ONCE
Refills: 0 | Status: COMPLETED | OUTPATIENT
Start: 2022-07-22 | End: 2022-07-22

## 2022-07-22 RX ADMIN — BEBTELOVIMAB 175 MILLIGRAM(S): 87.5 INJECTION, SOLUTION INTRAVENOUS at 09:29

## 2022-07-22 NOTE — MONOCLONAL ANTIBODY INFUSION - PRIOR COVID TREATMENT
Confirmed: COVID + PCR test on: 19-Jul-2022   Risk factors verified: Diagnosis: Headache, cough, sorethroat, bodyaches   Current symptoms: Afib, Reflux

## 2022-07-22 NOTE — MONOCLONAL ANTIBODY INFUSION - HOME MEDICATIONS
Keflex 500 mg oral capsule , 1 cap(s) orally every 6 hours MDD:2g  Oxaydo 5 mg oral tablet , 1 tab(s) orally every 6 hours, As Needed -for severe pain MDD:20mg  famotidine 20 mg oral tablet , 1 tab(s) orally once a day  omeprazole 40 mg oral delayed release capsule , 1 cap(s) orally once a day  metoprolol tartrate 25 mg oral tablet , 0.5 tab(s) orally 2 times a day  Eliquis 5 mg oral tablet , 1 tab(s) orally 2 times a day, if larger than tic tac, crush and put in low fat yogurt or pudding.

## 2022-07-22 NOTE — MONOCLONAL ANTIBODY INFUSION - ASSESSMENT AND PLAN
61 y/o morbidly obese F with PMHx of AF & GERD, recently diagnosed with Covid-19 infection who was referred for monoclonal antibody infusion after testing positive for COVID 19 on 7/19/22.  Patient states she has been experiencing headache, cough, sore throat, bodyaches.      PLAN:  - Injection procedure explained to patient   - Consent for monoclonal antibody injection obtained   - Risk & benefits discussed/all questions answered  - Inject Bebtelovimab 175 mg over 1 minutes   - Observe patient for one hour post administration    I have reviewed the Bebtelovimab Emergency Use Authorization (EUA) and I have provided the patient or patient's caregiver with the following information:    1. FDA has authorized emergency use Bebtelovimab, which is not an FDA-approved biological product.  2. The patient or patient's caregiver has the option to accept or refuse administration of Bebtelovimab.  3. The significant known and potential risks and benefits of Bebtelovimab and the extent to which such risks and benefits are unknown.  4. Information on available alternative treatments and risks and benefits of those alternatives.    The patient's COVID monoclonal antibody injection administration went well without any complications. The patient tolerated the treatment without any reactions. Vitals were stable throughout the injection & post-injection administration. The pt denies any CP, fevers, chills, SOB, numbness/tingling in b/l limbs, loss of sensation or motor function, N/V/D while receiving the injection. Patient denies any symptoms an hour after post injection. Vitals were taken post injection and were stable. Pt is medically stable to be discharged home. Discharge instructions were provided to the patient with a fact sheet included. Patient was instructed to self-isolate and use infection control measures (e.g wear mask, isolate, social distance, avoid sharing personal items, clean and disinfect "high touch" surfaces, and frequent handwashing according to the CDC guidelines. The patient was informed on what symptoms to be aware of for the next couple of days, and if there are any issues to call the 24/7 clinical call center. Patient was instructed to follow up with PCP as needed.

## 2022-07-23 ENCOUNTER — TRANSCRIPTION ENCOUNTER (OUTPATIENT)
Age: 62
End: 2022-07-23

## 2022-07-25 ENCOUNTER — NON-APPOINTMENT (OUTPATIENT)
Age: 62
End: 2022-07-25

## 2022-07-25 ENCOUNTER — APPOINTMENT (OUTPATIENT)
Dept: ELECTROPHYSIOLOGY | Facility: CLINIC | Age: 62
End: 2022-07-25

## 2022-07-25 ENCOUNTER — TRANSCRIPTION ENCOUNTER (OUTPATIENT)
Age: 62
End: 2022-07-25

## 2022-07-25 PROCEDURE — 93298 REM INTERROG DEV EVAL SCRMS: CPT

## 2022-07-25 PROCEDURE — G2066: CPT

## 2022-08-29 ENCOUNTER — APPOINTMENT (OUTPATIENT)
Dept: ELECTROPHYSIOLOGY | Facility: CLINIC | Age: 62
End: 2022-08-29

## 2022-08-29 ENCOUNTER — NON-APPOINTMENT (OUTPATIENT)
Age: 62
End: 2022-08-29

## 2022-08-29 PROCEDURE — 93298 REM INTERROG DEV EVAL SCRMS: CPT

## 2022-08-29 PROCEDURE — G2066: CPT

## 2022-10-03 ENCOUNTER — APPOINTMENT (OUTPATIENT)
Dept: ELECTROPHYSIOLOGY | Facility: CLINIC | Age: 62
End: 2022-10-03

## 2022-10-03 ENCOUNTER — NON-APPOINTMENT (OUTPATIENT)
Age: 62
End: 2022-10-03

## 2022-10-03 PROCEDURE — 93298 REM INTERROG DEV EVAL SCRMS: CPT

## 2022-10-03 PROCEDURE — G2066: CPT

## 2022-10-11 ENCOUNTER — RX RENEWAL (OUTPATIENT)
Age: 62
End: 2022-10-11

## 2022-10-21 ENCOUNTER — OUTPATIENT (OUTPATIENT)
Dept: OUTPATIENT SERVICES | Facility: HOSPITAL | Age: 62
LOS: 1 days | End: 2022-10-21
Payer: COMMERCIAL

## 2022-10-21 ENCOUNTER — APPOINTMENT (OUTPATIENT)
Dept: MAMMOGRAPHY | Facility: IMAGING CENTER | Age: 62
End: 2022-10-21

## 2022-10-21 DIAGNOSIS — Z00.8 ENCOUNTER FOR OTHER GENERAL EXAMINATION: ICD-10-CM

## 2022-10-21 DIAGNOSIS — Z98.84 BARIATRIC SURGERY STATUS: Chronic | ICD-10-CM

## 2022-10-21 DIAGNOSIS — Z95.818 PRESENCE OF OTHER CARDIAC IMPLANTS AND GRAFTS: Chronic | ICD-10-CM

## 2022-10-21 PROCEDURE — 77063 BREAST TOMOSYNTHESIS BI: CPT | Mod: 26

## 2022-10-21 PROCEDURE — 77067 SCR MAMMO BI INCL CAD: CPT

## 2022-10-21 PROCEDURE — 77063 BREAST TOMOSYNTHESIS BI: CPT

## 2022-10-21 PROCEDURE — 77067 SCR MAMMO BI INCL CAD: CPT | Mod: 26

## 2022-11-02 ENCOUNTER — APPOINTMENT (OUTPATIENT)
Dept: ORTHOPEDIC SURGERY | Facility: CLINIC | Age: 62
End: 2022-11-02

## 2022-11-02 VITALS — WEIGHT: 293 LBS | BODY MASS INDEX: 44.41 KG/M2 | HEIGHT: 68 IN

## 2022-11-02 PROCEDURE — 99204 OFFICE O/P NEW MOD 45 MIN: CPT | Mod: 25

## 2022-11-02 PROCEDURE — 20610 DRAIN/INJ JOINT/BURSA W/O US: CPT | Mod: RT

## 2022-11-02 PROCEDURE — 73564 X-RAY EXAM KNEE 4 OR MORE: CPT | Mod: RT

## 2022-11-02 NOTE — DISCUSSION/SUMMARY
[de-identified] : Right knee osteoarthritis with acute worsening of pain.  I discussed the treatment of degenerative arthritis with the patient at length today. I described the spectrum of treatment from nonoperative modalities to total joint arthroplasty. Noninvasive and nonoperative treatment modalities include weight reduction, activity modification with low impact exercise, PRN use of acetaminophen or anti-inflammatory medication if tolerated, glucosamine/chondroitin supplements, and physical therapy. Further treatments can include corticosteroid injection and the use of hyaluronic acid injections. Definitive treatment can certainly include total joint arthroplasty also. The risks and benefits of each treatment options was discussed and all questions were answered.\par \par Injection: Right knee joint.\par Indication: Arthritis.\par \par A discussion was had with the patient regarding this procedure and all questions were answered. All risks, benefits and alternatives were discussed. These included but were not limited to bleeding, infection, and allergic reaction. Alcohol was used to clean the skin, and betadine was used to sterilize and prep the area in the supero-lateral aspect of the right knee. Ethyl chloride spray was then used as a topical anesthetic. A 21-gauge needle was used to inject 4cc of 1% lidocaine and 1cc of 40mg/ml methylprednisolone into the knee. A sterile bandage was then applied. The patient tolerated the procedure well and there were no complications. \par \par Ice.  Tylenol.  Follow-up as needed

## 2022-11-02 NOTE — PHYSICAL EXAM
[de-identified] : General Exam\par \par Well developed, well nourished\par No apparent distress\par Oriented to person, place, and time\par Mood: Normal\par Affect: Normal\par Balance and coordination: Normal\par Gait: Normal\par \par right knee exam\par \par Skin: Clean, dry, intact\par Inspection: No obvious malalignment, no masses, no swelling, no effusion.\par Tenderness: no MJLT. No LJLT. No tenderness over the medial and lateral patella facets. No ttp medial/lateral epicondyle, patella tendon, tibial tubercle, pes anserinus, or proximal fibula.\par ROM: 0 to 130° no pain with deep flexion in both knees\par Stability: Stable to varus, valgus, lachman testing. Negative anterior/posterior drawer.\par Additional tests: Negative McMurrays test, Negative patellar grind test. \par Strength: 5/5 Q/H/TA/GS/EHL, no atrophy\par Neuro: In tact to light touch throughout in dp/sp/tib/yehuda/saph nerve districutions, DTR's normal\par Pulses: 2+ DP/PT pulses.\par  [de-identified] : \par The following radiographs were ordered and read by me during this patients visit. I reviewed each radiograph in detail with the patient and discussed the findings as highlighted below. \par \par 4 views right knee were obtained today.  Severe osteoarthritis with joint space narrowing osteophyte sclerosis

## 2022-11-02 NOTE — HISTORY OF PRESENT ILLNESS
[de-identified] : Pt. is a 62 yr old F with chronic R knee pain for years. Pt. denies any traumatic NYDIA. Pt. denies any neurological symptoms. Pt. has a history of knee osteoarthritis. Pt. had seen her PCP years ago for this issue and was prescribed physical therapy. Pt. came in to discuss treatment options. \par \par The patient's past medical history, past surgical history, medications, allergies, and social history were reviewed by me today with the patient and documented accordingly. In addition, the patient's family history, which is noncontributory to this visit, was also reviewed.\par

## 2022-11-17 ENCOUNTER — APPOINTMENT (OUTPATIENT)
Dept: ELECTROPHYSIOLOGY | Facility: CLINIC | Age: 62
End: 2022-11-17

## 2022-11-17 ENCOUNTER — NON-APPOINTMENT (OUTPATIENT)
Age: 62
End: 2022-11-17

## 2022-11-17 VITALS
SYSTOLIC BLOOD PRESSURE: 128 MMHG | HEART RATE: 55 BPM | BODY MASS INDEX: 44.41 KG/M2 | OXYGEN SATURATION: 96 % | DIASTOLIC BLOOD PRESSURE: 81 MMHG | HEIGHT: 68 IN | WEIGHT: 293 LBS

## 2022-11-17 PROCEDURE — 99215 OFFICE O/P EST HI 40 MIN: CPT

## 2022-11-17 PROCEDURE — 93000 ELECTROCARDIOGRAM COMPLETE: CPT

## 2022-11-17 RX ORDER — METRONIDAZOLE 500 MG/1
500 TABLET ORAL EVERY 8 HOURS
Qty: 30 | Refills: 0 | Status: DISCONTINUED | COMMUNITY
Start: 2021-12-21 | End: 2022-11-17

## 2022-11-17 RX ORDER — HYDROCORTISONE 10 MG/G
1 CREAM TOPICAL
Qty: 1 | Refills: 0 | Status: DISCONTINUED | COMMUNITY
Start: 2021-04-23 | End: 2022-11-17

## 2022-11-17 RX ORDER — PROMETHAZINE HYDROCHLORIDE AND DEXTROMETHORPHAN HYDROBROMIDE ORAL SOLUTION 15; 6.25 MG/5ML; MG/5ML
6.25-15 SOLUTION ORAL
Qty: 400 | Refills: 0 | Status: DISCONTINUED | COMMUNITY
Start: 2022-01-19 | End: 2022-11-17

## 2022-11-18 NOTE — CARDIOLOGY SUMMARY
[de-identified] : 11/13/2018, 1. Exercise capacity is 5 METS, which is poor for age andgender.2. Normal hemodynamic response.3. Normal electrocardiographic response.4. Normal augmentation in left ventricular systolicfunction.5. Appropriate heart rate response.6. Appropriate blood pressure response.7. Normal stress echocardiogram with no evidence ofinducible ischemia.  [de-identified] : 2/24/2022: normal LV systolic function

## 2022-11-18 NOTE — DISCUSSION/SUMMARY
[EKG obtained to assist in diagnosis and management of assessed problem(s)] : EKG obtained to assist in diagnosis and management of assessed problem(s) [FreeTextEntry1] : Impression:\par \par 1. Paroxysmal afib: EKG performed today to assess for presence of recurrent afib and reveals NSR. Review of ILR without evidence of recent afib. On Eliquis. ECHO with normal LVEF. If begins to have recurrent afib, consider improved management strategies at that time such as ablation. For now, resume metoprolol and Eliquis as prescribed. \par \par 2. SIMA: resume compliance with SIMA management to prevent future sinus node dysfunction and atrial fibrillation. Encouraged weight loss.\par \par Will continue f/u with Cardiologist and may RTO as needed or if any new or worsening symptoms or findings occur.

## 2022-11-18 NOTE — HISTORY OF PRESENT ILLNESS
[FreeTextEntry1] : Barbara Worthington is a 63y/o woman with Hx of SIMA on CPAP, GERD, and paroxysmal afib, on Eliquis and s/p ILR placement, who presents today for routine f/u. Admits doing well with no issues or complaints. Denies chest pain, palpitations, SOB, syncope or near syncope. ILR in place without evidence of recent afib.

## 2022-12-07 NOTE — REASON FOR VISIT
[Arrhythmia/ECG Abnorrmalities] : arrhythmia/ECG abnormalities [FreeTextEntry3] : Terence Guerrero MD  spinal/done...

## 2022-12-14 ENCOUNTER — NON-APPOINTMENT (OUTPATIENT)
Age: 62
End: 2022-12-14

## 2022-12-22 ENCOUNTER — APPOINTMENT (OUTPATIENT)
Dept: ELECTROPHYSIOLOGY | Facility: CLINIC | Age: 62
End: 2022-12-22

## 2022-12-22 ENCOUNTER — NON-APPOINTMENT (OUTPATIENT)
Age: 62
End: 2022-12-22

## 2022-12-22 PROCEDURE — 93298 REM INTERROG DEV EVAL SCRMS: CPT

## 2022-12-22 PROCEDURE — G2066: CPT

## 2023-01-04 ENCOUNTER — OUTPATIENT (OUTPATIENT)
Dept: OUTPATIENT SERVICES | Facility: HOSPITAL | Age: 63
LOS: 1 days | End: 2023-01-04

## 2023-01-04 VITALS
RESPIRATION RATE: 18 BRPM | TEMPERATURE: 97 F | SYSTOLIC BLOOD PRESSURE: 120 MMHG | DIASTOLIC BLOOD PRESSURE: 78 MMHG | HEART RATE: 50 BPM | HEIGHT: 66 IN | OXYGEN SATURATION: 96 % | WEIGHT: 293 LBS

## 2023-01-04 DIAGNOSIS — Z98.890 OTHER SPECIFIED POSTPROCEDURAL STATES: Chronic | ICD-10-CM

## 2023-01-04 DIAGNOSIS — Z98.84 BARIATRIC SURGERY STATUS: Chronic | ICD-10-CM

## 2023-01-04 DIAGNOSIS — Z95.818 PRESENCE OF OTHER CARDIAC IMPLANTS AND GRAFTS: Chronic | ICD-10-CM

## 2023-01-04 DIAGNOSIS — I48.0 PAROXYSMAL ATRIAL FIBRILLATION: ICD-10-CM

## 2023-01-04 DIAGNOSIS — G47.33 OBSTRUCTIVE SLEEP APNEA (ADULT) (PEDIATRIC): ICD-10-CM

## 2023-01-04 LAB
ALBUMIN SERPL ELPH-MCNC: 4.1 G/DL — SIGNIFICANT CHANGE UP (ref 3.3–5)
ALP SERPL-CCNC: 100 U/L — SIGNIFICANT CHANGE UP (ref 40–120)
ALT FLD-CCNC: 16 U/L — SIGNIFICANT CHANGE UP (ref 4–33)
ANION GAP SERPL CALC-SCNC: 10 MMOL/L — SIGNIFICANT CHANGE UP (ref 7–14)
AST SERPL-CCNC: 23 U/L — SIGNIFICANT CHANGE UP (ref 4–32)
BILIRUB SERPL-MCNC: 0.6 MG/DL — SIGNIFICANT CHANGE UP (ref 0.2–1.2)
BLD GP AB SCN SERPL QL: NEGATIVE — SIGNIFICANT CHANGE UP
BUN SERPL-MCNC: 11 MG/DL — SIGNIFICANT CHANGE UP (ref 7–23)
CALCIUM SERPL-MCNC: 9.2 MG/DL — SIGNIFICANT CHANGE UP (ref 8.4–10.5)
CHLORIDE SERPL-SCNC: 103 MMOL/L — SIGNIFICANT CHANGE UP (ref 98–107)
CO2 SERPL-SCNC: 26 MMOL/L — SIGNIFICANT CHANGE UP (ref 22–31)
CREAT SERPL-MCNC: 0.7 MG/DL — SIGNIFICANT CHANGE UP (ref 0.5–1.3)
EGFR: 98 ML/MIN/1.73M2 — SIGNIFICANT CHANGE UP
GLUCOSE SERPL-MCNC: 87 MG/DL — SIGNIFICANT CHANGE UP (ref 70–99)
HCT VFR BLD CALC: 40 % — SIGNIFICANT CHANGE UP (ref 34.5–45)
HGB BLD-MCNC: 12.7 G/DL — SIGNIFICANT CHANGE UP (ref 11.5–15.5)
MCHC RBC-ENTMCNC: 29.1 PG — SIGNIFICANT CHANGE UP (ref 27–34)
MCHC RBC-ENTMCNC: 31.8 GM/DL — LOW (ref 32–36)
MCV RBC AUTO: 91.5 FL — SIGNIFICANT CHANGE UP (ref 80–100)
NRBC # BLD: 0 /100 WBCS — SIGNIFICANT CHANGE UP (ref 0–0)
NRBC # FLD: 0 K/UL — SIGNIFICANT CHANGE UP (ref 0–0)
PLATELET # BLD AUTO: 265 K/UL — SIGNIFICANT CHANGE UP (ref 150–400)
POTASSIUM SERPL-MCNC: 4.5 MMOL/L — SIGNIFICANT CHANGE UP (ref 3.5–5.3)
POTASSIUM SERPL-SCNC: 4.5 MMOL/L — SIGNIFICANT CHANGE UP (ref 3.5–5.3)
PROT SERPL-MCNC: 7.3 G/DL — SIGNIFICANT CHANGE UP (ref 6–8.3)
RBC # BLD: 4.37 M/UL — SIGNIFICANT CHANGE UP (ref 3.8–5.2)
RBC # FLD: 13.5 % — SIGNIFICANT CHANGE UP (ref 10.3–14.5)
RH IG SCN BLD-IMP: NEGATIVE — SIGNIFICANT CHANGE UP
SODIUM SERPL-SCNC: 139 MMOL/L — SIGNIFICANT CHANGE UP (ref 135–145)
WBC # BLD: 6.51 K/UL — SIGNIFICANT CHANGE UP (ref 3.8–10.5)
WBC # FLD AUTO: 6.51 K/UL — SIGNIFICANT CHANGE UP (ref 3.8–10.5)

## 2023-01-04 RX ORDER — FAMOTIDINE 10 MG/ML
1 INJECTION INTRAVENOUS
Qty: 0 | Refills: 0 | DISCHARGE

## 2023-01-04 RX ORDER — APIXABAN 2.5 MG/1
1 TABLET, FILM COATED ORAL
Qty: 0 | Refills: 0 | DISCHARGE

## 2023-01-04 RX ORDER — OMEPRAZOLE 10 MG/1
1 CAPSULE, DELAYED RELEASE ORAL
Qty: 0 | Refills: 0 | DISCHARGE

## 2023-01-04 RX ORDER — METOPROLOL TARTRATE 50 MG
1 TABLET ORAL
Qty: 0 | Refills: 0 | DISCHARGE

## 2023-01-04 NOTE — H&P PST ADULT - NSICDXPASTSURGICALHX_GEN_ALL_CORE_FT
PAST SURGICAL HISTORY:  Status post gastric banding surgery 2008    Status post placement of implantable loop recorder 2/2019     PAST SURGICAL HISTORY:  H/O varicose vein ligation and stripping     Status post gastric banding surgery 2008    Status post placement of implantable loop recorder 2/2019    Status post placement of implantable loop recorder      PAST SURGICAL HISTORY:  H/O varicose vein ligation and stripping     Status post gastric banding surgery 2008    Status post laparoscopic sleeve gastrectomy     Status post placement of implantable loop recorder 2/2019    Status post placement of implantable loop recorder

## 2023-01-04 NOTE — H&P PST ADULT - PROBLEM SELECTOR PLAN 1
Schedule for loop recorder explant and re-implant on 01/17/2023. Pre op instructions, chlorhexidine gluconate soap given and explained. Pt also instructed to follow surgeon's pre op instructions. Pt verbalized understanding.   Covid-19 PCR ordered.

## 2023-01-04 NOTE — H&P PST ADULT - HISTORY OF PRESENT ILLNESS
63 y/o F   battery  61 y/o F with H/O: SIMA on CPAP, AFIB. on Eliquis, GERD presents for pre op evaluation in preparation for loop recorder explant and re-implant 61 y/o F with H/O: SIMA on CPAP, AFIB. on Eliquis, morbid obesity, GERD presents for pre op evaluation in preparation for loop recorder explant and re-implant.

## 2023-01-04 NOTE — H&P PST ADULT - MUSCULOSKELETAL
details… no joint erythema/no calf tenderness/decreased ROM due to pain/joint swelling no joint erythema/no calf tenderness/joint swelling

## 2023-01-04 NOTE — H&P PST ADULT - NEGATIVE ENMT SYMPTOMS
no nasal congestion/no post-nasal discharge/no nose bleeds/no abnormal taste sensation/no throat pain/no dysphagia

## 2023-01-04 NOTE — H&P PST ADULT - NEGATIVE GASTROINTESTINAL SYMPTOMS
no nausea/no diarrhea/no constipation/no change in bowel habits/no jaundice/no hiccoughs no nausea/no vomiting/no diarrhea/no constipation/no change in bowel habits/no jaundice/no hiccoughs no nausea/no vomiting/no diarrhea/no constipation/no change in bowel habits/no abdominal pain/no melena/no jaundice/no hiccoughs

## 2023-01-04 NOTE — H&P PST ADULT - BLOOD AVOIDANCE/RESTRICTIONS, PROFILE
Pt awake and alert, resting quietly, dancing and appears well. Seen and cleared for discharge by MD none

## 2023-01-16 LAB — SARS-COV-2 RNA SPEC QL NAA+PROBE: SIGNIFICANT CHANGE UP

## 2023-01-17 ENCOUNTER — OUTPATIENT (OUTPATIENT)
Dept: OUTPATIENT SERVICES | Facility: HOSPITAL | Age: 63
LOS: 1 days | Discharge: HOME | End: 2023-01-17
Payer: COMMERCIAL

## 2023-01-17 DIAGNOSIS — Z95.818 PRESENCE OF OTHER CARDIAC IMPLANTS AND GRAFTS: Chronic | ICD-10-CM

## 2023-01-17 DIAGNOSIS — Z98.890 OTHER SPECIFIED POSTPROCEDURAL STATES: Chronic | ICD-10-CM

## 2023-01-17 DIAGNOSIS — I48.0 PAROXYSMAL ATRIAL FIBRILLATION: ICD-10-CM

## 2023-01-17 DIAGNOSIS — Z98.84 BARIATRIC SURGERY STATUS: Chronic | ICD-10-CM

## 2023-01-17 PROCEDURE — 33286 RMVL SUBQ CAR RHYTHM MNTR: CPT | Mod: 59

## 2023-01-17 PROCEDURE — 33285 INSJ SUBQ CAR RHYTHM MNTR: CPT

## 2023-01-17 RX ORDER — APIXABAN 2.5 MG/1
1 TABLET, FILM COATED ORAL
Qty: 0 | Refills: 0 | DISCHARGE

## 2023-01-17 RX ORDER — FAMOTIDINE 10 MG/ML
1 INJECTION INTRAVENOUS
Qty: 0 | Refills: 0 | DISCHARGE

## 2023-01-17 RX ORDER — OMEPRAZOLE 10 MG/1
1 CAPSULE, DELAYED RELEASE ORAL
Qty: 0 | Refills: 0 | DISCHARGE

## 2023-01-17 RX ORDER — METOPROLOL TARTRATE 50 MG
0.5 TABLET ORAL
Qty: 0 | Refills: 0 | DISCHARGE

## 2023-01-17 NOTE — CHART NOTE - NSCHARTNOTEFT_GEN_A_CORE
ELECTROPHYSIOLOGY      Patient s/p explant and reimplant of an ILR for monitoring of AFib burden.   Tolerated the procedure well. No complications.   Vital signs stable. Currently in sinus rhythm.   Dressing dry and intact with no evidence of bleeding, ecchymosis or swelling.   Post procedure ILR teaching done. Written instructions and contact information provided.   She was given a home monitor with verbal and written instructions.   She has a follow-up device clinic appointment on 1/26/23 at 8:45am  4th floor Oncology [facility] [phone].

## 2023-01-17 NOTE — CHART NOTE - NSCHARTNOTEFT_GEN_A_CORE
Interventional Recovery Suite Pre-Procedure PA Note    In brief, this is a 61 y/o female with a PMHx of paroxysmal atrial fibrillation s/p ILR placement on Eliquis (last dose 1/16/2023 PM), SIMA on CPAP QHS, and GERD who presents for ILR explant and ILR re-implant. H&P from PST reviewed. Medication reconciliation edited/updated where appropriate. Last dose of Eliquis was 1/16/2023 at 23:00. Last PO intake was 1/17/2023 AM. Pt has upper and lower dentures. COVID PCR negative from 1/15/2023. Procedure explained in detail. Risks/benefits discussed. All questions answered. Consent obtained.

## 2023-01-18 ENCOUNTER — NON-APPOINTMENT (OUTPATIENT)
Age: 63
End: 2023-01-18

## 2023-01-18 ENCOUNTER — APPOINTMENT (OUTPATIENT)
Dept: CARDIOLOGY | Facility: CLINIC | Age: 63
End: 2023-01-18
Payer: COMMERCIAL

## 2023-01-18 VITALS
OXYGEN SATURATION: 97 % | BODY MASS INDEX: 44.41 KG/M2 | HEIGHT: 68 IN | HEART RATE: 58 BPM | SYSTOLIC BLOOD PRESSURE: 115 MMHG | DIASTOLIC BLOOD PRESSURE: 60 MMHG | WEIGHT: 293 LBS | TEMPERATURE: 97.8 F

## 2023-01-18 DIAGNOSIS — Z71.85 ENCOUNTER FOR IMMUNIZATION SAFETY COUNSELING: ICD-10-CM

## 2023-01-18 DIAGNOSIS — I73.9 PERIPHERAL VASCULAR DISEASE, UNSPECIFIED: ICD-10-CM

## 2023-01-18 PROCEDURE — 99396 PREV VISIT EST AGE 40-64: CPT

## 2023-01-18 PROCEDURE — 93000 ELECTROCARDIOGRAM COMPLETE: CPT

## 2023-01-18 NOTE — PHYSICAL EXAM
[No Acute Distress] : no acute distress [Well Nourished] : well nourished [Well Developed] : well developed [Well-Appearing] : well-appearing [Normal Sclera/Conjunctiva] : normal sclera/conjunctiva [PERRL] : pupils equal round and reactive to light [EOMI] : extraocular movements intact [Normal Outer Ear/Nose] : the outer ears and nose were normal in appearance [Normal Oropharynx] : the oropharynx was normal [No JVD] : no jugular venous distention [No Lymphadenopathy] : no lymphadenopathy [Supple] : supple [Thyroid Normal, No Nodules] : the thyroid was normal and there were no nodules present [No Respiratory Distress] : no respiratory distress  [No Accessory Muscle Use] : no accessory muscle use [Clear to Auscultation] : lungs were clear to auscultation bilaterally [Normal Rate] : normal rate  [Regular Rhythm] : with a regular rhythm [Normal S1, S2] : normal S1 and S2 [No Murmur] : no murmur heard [No Carotid Bruits] : no carotid bruits [No Abdominal Bruit] : a ~M bruit was not heard ~T in the abdomen [No Varicosities] : no varicosities [Pedal Pulses Present] : the pedal pulses are present [No Edema] : there was no peripheral edema [No Palpable Aorta] : no palpable aorta [No Extremity Clubbing/Cyanosis] : no extremity clubbing/cyanosis [Soft] : abdomen soft [Non Tender] : non-tender [Non-distended] : non-distended [No Masses] : no abdominal mass palpated [No HSM] : no HSM [Normal Bowel Sounds] : normal bowel sounds [Normal Posterior Cervical Nodes] : no posterior cervical lymphadenopathy [Normal Anterior Cervical Nodes] : no anterior cervical lymphadenopathy [No CVA Tenderness] : no CVA  tenderness [No Spinal Tenderness] : no spinal tenderness [No Joint Swelling] : no joint swelling [Grossly Normal Strength/Tone] : grossly normal strength/tone [No Rash] : no rash [Coordination Grossly Intact] : coordination grossly intact [No Focal Deficits] : no focal deficits [Normal Gait] : normal gait [Deep Tendon Reflexes (DTR)] : deep tendon reflexes were 2+ and symmetric [Normal Affect] : the affect was normal [Normal Insight/Judgement] : insight and judgment were intact [de-identified] : overweight

## 2023-01-18 NOTE — HISTORY OF PRESENT ILLNESS
[FreeTextEntry1] : Here for annual wellness exam  [de-identified] : This is a 63 y/o female with a pmhx of PAF, SIMA, OA, Eczema, obesity, and GERD who presents today for her annual physical exam. Patient had loop recorder implant explant yesterday. Patient feels well and has no acute concerns or complaints. Patient denies chest pain, dyspnea, palpitations, dizziness, syncope, changes in bowel/bladder habits or appetite. \par

## 2023-01-20 ENCOUNTER — NON-APPOINTMENT (OUTPATIENT)
Age: 63
End: 2023-01-20

## 2023-01-26 ENCOUNTER — APPOINTMENT (OUTPATIENT)
Dept: ELECTROPHYSIOLOGY | Facility: CLINIC | Age: 63
End: 2023-01-26

## 2023-01-26 ENCOUNTER — APPOINTMENT (OUTPATIENT)
Dept: ELECTROPHYSIOLOGY | Facility: CLINIC | Age: 63
End: 2023-01-26
Payer: COMMERCIAL

## 2023-01-26 PROCEDURE — 93285 PRGRMG DEV EVAL SCRMS IP: CPT

## 2023-02-09 NOTE — DISCUSSION/SUMMARY
[de-identified] : Options reviewed, continue Cam boot immobilization and Cam boot ambulation, activity modification, calf stretching exercises and HEP.  Return to office in 3 - 4 weeks / PRN, all questions answered. No

## 2023-03-06 ENCOUNTER — APPOINTMENT (OUTPATIENT)
Dept: ELECTROPHYSIOLOGY | Facility: CLINIC | Age: 63
End: 2023-03-06
Payer: COMMERCIAL

## 2023-03-06 ENCOUNTER — NON-APPOINTMENT (OUTPATIENT)
Age: 63
End: 2023-03-06

## 2023-03-06 PROCEDURE — G2066: CPT

## 2023-03-06 PROCEDURE — 93298 REM INTERROG DEV EVAL SCRMS: CPT

## 2023-03-14 ENCOUNTER — NON-APPOINTMENT (OUTPATIENT)
Age: 63
End: 2023-03-14

## 2023-03-19 ENCOUNTER — TRANSCRIPTION ENCOUNTER (OUTPATIENT)
Age: 63
End: 2023-03-19

## 2023-03-20 ENCOUNTER — OUTPATIENT (OUTPATIENT)
Dept: OUTPATIENT SERVICES | Facility: HOSPITAL | Age: 63
LOS: 1 days | End: 2023-03-20
Payer: COMMERCIAL

## 2023-03-20 DIAGNOSIS — Z95.818 PRESENCE OF OTHER CARDIAC IMPLANTS AND GRAFTS: Chronic | ICD-10-CM

## 2023-03-20 DIAGNOSIS — Z98.84 BARIATRIC SURGERY STATUS: Chronic | ICD-10-CM

## 2023-03-20 DIAGNOSIS — K21.9 GASTRO-ESOPHAGEAL REFLUX DISEASE WITHOUT ESOPHAGITIS: ICD-10-CM

## 2023-03-20 DIAGNOSIS — D50.0 IRON DEFICIENCY ANEMIA SECONDARY TO BLOOD LOSS (CHRONIC): ICD-10-CM

## 2023-03-20 DIAGNOSIS — R13.11 DYSPHAGIA, ORAL PHASE: ICD-10-CM

## 2023-03-20 DIAGNOSIS — Z98.890 OTHER SPECIFIED POSTPROCEDURAL STATES: Chronic | ICD-10-CM

## 2023-03-20 PROCEDURE — 88313 SPECIAL STAINS GROUP 2: CPT | Mod: 26

## 2023-03-20 PROCEDURE — 88305 TISSUE EXAM BY PATHOLOGIST: CPT

## 2023-03-20 PROCEDURE — 88312 SPECIAL STAINS GROUP 1: CPT | Mod: 26

## 2023-03-20 PROCEDURE — 88313 SPECIAL STAINS GROUP 2: CPT

## 2023-03-20 PROCEDURE — 88305 TISSUE EXAM BY PATHOLOGIST: CPT | Mod: 26

## 2023-03-20 PROCEDURE — 43239 EGD BIOPSY SINGLE/MULTIPLE: CPT

## 2023-03-20 PROCEDURE — 45380 COLONOSCOPY AND BIOPSY: CPT | Mod: PT

## 2023-03-20 PROCEDURE — 88312 SPECIAL STAINS GROUP 1: CPT

## 2023-03-20 DEVICE — CLIP RESOLUTION 360 235CM: Type: IMPLANTABLE DEVICE | Status: FUNCTIONAL

## 2023-03-23 ENCOUNTER — APPOINTMENT (OUTPATIENT)
Dept: ENDOCRINOLOGY | Facility: CLINIC | Age: 63
End: 2023-03-23

## 2023-04-10 ENCOUNTER — APPOINTMENT (OUTPATIENT)
Dept: ELECTROPHYSIOLOGY | Facility: CLINIC | Age: 63
End: 2023-04-10
Payer: COMMERCIAL

## 2023-04-10 ENCOUNTER — NON-APPOINTMENT (OUTPATIENT)
Age: 63
End: 2023-04-10

## 2023-04-10 PROCEDURE — 93298 REM INTERROG DEV EVAL SCRMS: CPT

## 2023-04-10 PROCEDURE — G2066: CPT

## 2023-04-14 ENCOUNTER — NON-APPOINTMENT (OUTPATIENT)
Age: 63
End: 2023-04-14

## 2023-04-14 ENCOUNTER — APPOINTMENT (OUTPATIENT)
Dept: ENDOCRINOLOGY | Facility: CLINIC | Age: 63
End: 2023-04-14
Payer: COMMERCIAL

## 2023-04-14 VITALS
OXYGEN SATURATION: 96 % | SYSTOLIC BLOOD PRESSURE: 110 MMHG | WEIGHT: 293 LBS | HEART RATE: 69 BPM | HEIGHT: 68 IN | TEMPERATURE: 98 F | BODY MASS INDEX: 44.41 KG/M2 | DIASTOLIC BLOOD PRESSURE: 72 MMHG

## 2023-04-14 PROCEDURE — 99214 OFFICE O/P EST MOD 30 MIN: CPT

## 2023-04-14 PROCEDURE — 99204 OFFICE O/P NEW MOD 45 MIN: CPT

## 2023-04-23 NOTE — HISTORY OF PRESENT ILLNESS
[FreeTextEntry1] : Ms. FLOWER  is a 62 year  year old female  who presents for initial endocrine evaluation. She presents with regard to a history of weight gain; referred by Dr. Guerrero.  \par Patient has hx of two bariatric surgeries. Has tried various diets.  Had lap band then 10 yrs alter-about 4 yrs ago-had gastric sleeve\par Lost about 50 lbs both times but then weight returned both times.\par In pasts has tried Topomax.\par \par At age 8 in hospital with glomerulonephritis-went on bed rest -then weighjt gain\par \par Too, FH -overweight.\par Does walk, completes 1361-7213 steps per day, uses [employer] watch. 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 \par Patient used to take qsymia (?) as well as another oral weight loss medications including Topamax  after her second bariatric surgery around 2019. D/C'd due to ineffectiveness. No s/e from these meds, but no weight loss noted. \par Today weight = 348 lbs, BMI = 52.93.\par \par Additional medical history includes that of paroxysmal A-Fib, taking Eliquis and metoprolol and uses   .\par Also hx of GERD, taking Pepcid and omeprazole. Also taking vitamin D+calcium gummy supplement (unsure of dose). \par Patient is post-menopausal. \par \par Family hx:\par sister with goiter\par brother had T2DM, passed away from heart complications , was on heart transplant list\par Father passed away heart disease

## 2023-05-01 ENCOUNTER — NON-APPOINTMENT (OUTPATIENT)
Age: 63
End: 2023-05-01

## 2023-05-15 ENCOUNTER — NON-APPOINTMENT (OUTPATIENT)
Age: 63
End: 2023-05-15

## 2023-05-15 ENCOUNTER — APPOINTMENT (OUTPATIENT)
Dept: ELECTROPHYSIOLOGY | Facility: CLINIC | Age: 63
End: 2023-05-15
Payer: COMMERCIAL

## 2023-05-15 PROCEDURE — 93298 REM INTERROG DEV EVAL SCRMS: CPT

## 2023-05-15 PROCEDURE — G2066: CPT

## 2023-05-16 ENCOUNTER — APPOINTMENT (OUTPATIENT)
Dept: ORTHOPEDIC SURGERY | Facility: CLINIC | Age: 63
End: 2023-05-16
Payer: COMMERCIAL

## 2023-05-16 PROCEDURE — 99214 OFFICE O/P EST MOD 30 MIN: CPT | Mod: 25

## 2023-05-16 PROCEDURE — 73564 X-RAY EXAM KNEE 4 OR MORE: CPT | Mod: LT

## 2023-05-16 PROCEDURE — 20610 DRAIN/INJ JOINT/BURSA W/O US: CPT | Mod: RT

## 2023-05-16 NOTE — HISTORY OF PRESENT ILLNESS
[de-identified] : 64yo female presents for follow-up right knee osteoarthritis.  Received cortisone injection in November with great relief.  She has since developed left knee pain.  And pain in right knee has returned.  She states she is going to [place] on a trip for 2 weeks soon and is requesting repeat injections today for both of her knees.  No injuries or trauma.  Denies numbness tingling

## 2023-05-16 NOTE — PHYSICAL EXAM
[de-identified] : right knee exam\par \par Skin: Clean, dry, intact\par Inspection: No obvious malalignment, no masses, no swelling, no effusion.\par Tenderness: no MJLT. No LJLT. No tenderness over the medial and lateral patella facets. No ttp medial/lateral epicondyle, patella tendon, tibial tubercle, pes anserinus, or proximal fibula.\par ROM: 0 to 130° no pain with deep flexion in both knees\par Stability: Stable to varus, valgus, lachman testing. Negative anterior/posterior drawer.\par Additional tests: Negative McMurrays test, Negative patellar grind test. \par Strength: 5/5 Q/H/TA/GS/EHL, no atrophy\par Neuro: In tact to light touch throughout in dp/sp/tib/yehuda/saph nerve districutions, DTR's normal\par Pulses: 2+ DP/PT pulses.\par \par left knee exam\par \par Skin: Clean, dry, intact\par Inspection: No obvious malalignment, no masses, no swelling, no effusion.\par Tenderness: + MJLT. No LJLT. No tenderness over the medial and lateral patella facets. No ttp medial/lateral epicondyle, patella tendon, tibial tubercle, pes anserinus, or proximal fibula.\par ROM: 0 to 130° no pain with deep flexion\par Stability: Stable to varus, valgus, lachman testing. Negative anterior/posterior drawer.\par Additional tests: Negative McMurrays test, Negative patellar grind test. \par Strength: 5/5 Q/H/TA/GS/EHL, no atrophy\par Neuro: In tact to light touch throughout in dp/sp/tib/yehuda/saph nerve districutions, DTR's normal\par Pulses: 2+ DP/PT pulses. [de-identified] : \par The following radiographs were ordered and read by me during this patients visit. I reviewed each radiograph in detail with the patient and discussed the findings as highlighted below. \par \par 4 views left knee were obtained today.  Moderate to severe arthrosis loss of joint space osteophyte sclerosis

## 2023-05-16 NOTE — DISCUSSION/SUMMARY
[de-identified] : Bilateral knee osteoarthritis.  I discussed the treatment of degenerative arthritis with the patient at length today. I described the spectrum of treatment from nonoperative modalities to total joint arthroplasty. Noninvasive and nonoperative treatment modalities include weight reduction, activity modification with low impact exercise, PRN use of acetaminophen or anti-inflammatory medication if tolerated, glucosamine/chondroitin supplements, and physical therapy. Further treatments can include corticosteroid injection and the use of hyaluronic acid injections. Definitive treatment can certainly include total joint arthroplasty also. The risks and benefits of each treatment options was discussed and all questions were answered.\par \par Injection: Right knee joint.\par Indication: Arthritis\par \par A discussion was had with the patient regarding this procedure and all questions were answered. All risks, benefits and alternatives were discussed. These included but were not limited to bleeding, infection, and allergic reaction. Alcohol was used to clean the skin, and betadine was used to sterilize and prep the area in the supero-lateral aspect of the right knee. Ethyl chloride spray was then used as a topical anesthetic. A 21-gauge needle was used to inject 4cc of 1% lidocaine and 1cc of 40mg/ml methylprednisolone into the knee. A sterile bandage was then applied. The patient tolerated the procedure well and there were no complications. \par \par Injection: Left knee joint.\par Indication: Arthritis\par A discussion was had with the patient regarding this procedure and all questions were answered. All risks, benefits and alternatives were discussed. These included but were not limited to bleeding, infection, and allergic reaction. Alcohol was used to clean the skin, and betadine was used to sterilize and prep the area in the supero-lateral aspect of the left knee. Ethyl chloride spray was then used as a topical anesthetic. A 21-gauge needle was used to inject 4cc of 1% lidocaine and 1cc of 40mg/ml methylprednisolone into the knee. A sterile bandage was then applied. The patient tolerated the procedure well and there were no complications. \par \par Ice.  Tylenol.  Follow-up as needed

## 2023-06-16 ENCOUNTER — NON-APPOINTMENT (OUTPATIENT)
Age: 63
End: 2023-06-16

## 2023-06-16 ENCOUNTER — APPOINTMENT (OUTPATIENT)
Dept: ELECTROPHYSIOLOGY | Facility: CLINIC | Age: 63
End: 2023-06-16
Payer: COMMERCIAL

## 2023-06-16 PROCEDURE — 93298 REM INTERROG DEV EVAL SCRMS: CPT

## 2023-06-16 PROCEDURE — G2066: CPT

## 2023-07-11 ENCOUNTER — APPOINTMENT (OUTPATIENT)
Dept: ORTHOPEDIC SURGERY | Facility: CLINIC | Age: 63
End: 2023-07-11
Payer: COMMERCIAL

## 2023-07-11 VITALS
WEIGHT: 293 LBS | HEIGHT: 68 IN | OXYGEN SATURATION: 97 % | BODY MASS INDEX: 44.41 KG/M2 | DIASTOLIC BLOOD PRESSURE: 82 MMHG | SYSTOLIC BLOOD PRESSURE: 135 MMHG | HEART RATE: 58 BPM

## 2023-07-11 DIAGNOSIS — M76.61 ACHILLES TENDINITIS, RIGHT LEG: ICD-10-CM

## 2023-07-11 DIAGNOSIS — M92.60 JUVENILE OSTEOCHONDROSIS OF TARSUS, UNSPECIFIED ANKLE: ICD-10-CM

## 2023-07-11 DIAGNOSIS — M77.8 OTHER ENTHESOPATHIES, NOT ELSEWHERE CLASSIFIED: ICD-10-CM

## 2023-07-11 DIAGNOSIS — M21.861 OTHER SPECIFIED ACQUIRED DEFORMITIES OF RIGHT LOWER LEG: ICD-10-CM

## 2023-07-11 DIAGNOSIS — M67.88 OTHER SPECIFIED DISORDERS OF SYNOVIUM AND TENDON, OTHER SITE: ICD-10-CM

## 2023-07-11 PROCEDURE — 73630 X-RAY EXAM OF FOOT: CPT | Mod: RT

## 2023-07-11 PROCEDURE — 99214 OFFICE O/P EST MOD 30 MIN: CPT

## 2023-07-20 ENCOUNTER — APPOINTMENT (OUTPATIENT)
Dept: ENDOCRINOLOGY | Facility: CLINIC | Age: 63
End: 2023-07-20
Payer: COMMERCIAL

## 2023-07-20 VITALS
HEART RATE: 62 BPM | DIASTOLIC BLOOD PRESSURE: 65 MMHG | OXYGEN SATURATION: 98 % | SYSTOLIC BLOOD PRESSURE: 110 MMHG | BODY MASS INDEX: 44.41 KG/M2 | WEIGHT: 293 LBS | HEIGHT: 68 IN | TEMPERATURE: 98 F

## 2023-07-20 PROCEDURE — 99214 OFFICE O/P EST MOD 30 MIN: CPT

## 2023-07-21 ENCOUNTER — APPOINTMENT (OUTPATIENT)
Dept: ELECTROPHYSIOLOGY | Facility: CLINIC | Age: 63
End: 2023-07-21
Payer: COMMERCIAL

## 2023-07-21 ENCOUNTER — NON-APPOINTMENT (OUTPATIENT)
Age: 63
End: 2023-07-21

## 2023-07-21 PROCEDURE — G2066: CPT

## 2023-07-21 PROCEDURE — 93298 REM INTERROG DEV EVAL SCRMS: CPT

## 2023-07-30 NOTE — HISTORY OF PRESENT ILLNESS
[FreeTextEntry1] : Ms. FLOWER  is a 63 year old female who returns with regard to a history of weight gain. Patient has hx of two bariatric surgeries. Has tried various diets. Had lap band then 10 yrs alter-about 4 yrs ago-had gastric sleeve.  At last visit in April, she was started on Wegovy 0.25 mg weekly. Has tolerated well to date only with mild GI symptoms on the day of injection. Today weight = 332 lbs, previously 348 in April 2023. States that she is continuing to lose weight.   At age 8 in hospital with glomerulonephritis-went on bed rest -then weight gain  Too, FH -overweight. Does walk, completes 3043-7074 steps per day, uses [employer] watch.  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  Patient used to take qsymia (?) as well as another oral weight loss medications including Topamax  after her second bariatric surgery around 2019. D/C'd due to ineffectiveness. No s/e from these meds, but no weight loss noted.    Additional medical history includes that of paroxysmal A-Fib, taking Eliquis and metoprolol  Also hx of GERD, taking Pepcid and omeprazole. Also taking vitamin D+calcium gummy supplement (unsure of dose).

## 2023-08-07 ENCOUNTER — NON-APPOINTMENT (OUTPATIENT)
Age: 63
End: 2023-08-07

## 2023-08-08 ENCOUNTER — APPOINTMENT (OUTPATIENT)
Dept: VASCULAR SURGERY | Facility: CLINIC | Age: 63
End: 2023-08-08

## 2023-08-08 ENCOUNTER — APPOINTMENT (OUTPATIENT)
Dept: INTERNAL MEDICINE | Facility: CLINIC | Age: 63
End: 2023-08-08
Payer: COMMERCIAL

## 2023-08-08 DIAGNOSIS — I48.91 UNSPECIFIED ATRIAL FIBRILLATION: ICD-10-CM

## 2023-08-08 DIAGNOSIS — U07.1 COVID-19: ICD-10-CM

## 2023-08-08 PROCEDURE — 99213 OFFICE O/P EST LOW 20 MIN: CPT | Mod: 95

## 2023-08-08 RX ORDER — PROMETHAZINE HYDROCHLORIDE AND DEXTROMETHORPHAN HYDROBROMIDE ORAL SOLUTION 15; 6.25 MG/5ML; MG/5ML
6.25-15 SOLUTION ORAL EVERY 6 HOURS
Qty: 140 | Refills: 0 | Status: ACTIVE | COMMUNITY
Start: 2023-08-08 | End: 1900-01-01

## 2023-08-08 NOTE — HEALTH RISK ASSESSMENT
[0] : 2) Feeling down, depressed, or hopeless: Not at all (0) [PHQ-2 Negative - No further assessment needed] : PHQ-2 Negative - No further assessment needed [Former] : Former [[id number]] : 0

## 2023-08-08 NOTE — HISTORY OF PRESENT ILLNESS
[Home] : at home, [unfilled] , at the time of the visit. [Medical Office: ([facility])___] : at the medical office located in  [Verbal consent obtained from patient] : the patient, [unfilled] [FreeTextEntry1] : COVID [de-identified] : This is a 62 y/o female with a pmhx of PAF, SIMA, OA, Eczema, obesity, and GERD who presents today for COVID . She c/o sore throat, dry coughing, and headache which started yesterday. She was diagnosed with COVID this morning at urgent care, Was prescribed Paxlovid but has not picked it up yet and is currently on eliquis and not sure if she should take it. Was vaccinated 3x and this is her 3rd time having COVID. Says this time is more mild then others. Denies f/c, body aches.  Denies chest pain, SOB, HERNANDEZ, dizziness, diaphoresis, palpitations, LE swelling, orthopnea, syncope, n/v.

## 2023-08-08 NOTE — PHYSICAL EXAM
[No Acute Distress] : no acute distress [No Respiratory Distress] : no respiratory distress  [No Accessory Muscle Use] : no accessory muscle use [Well-Appearing] : well-appearing [Speech Grossly Normal] : speech grossly normal [Memory Grossly Normal] : memory grossly normal [Alert and Oriented x3] : oriented to person, place, and time [Normal Mood] : the mood was normal

## 2023-08-25 ENCOUNTER — APPOINTMENT (OUTPATIENT)
Dept: ELECTROPHYSIOLOGY | Facility: CLINIC | Age: 63
End: 2023-08-25
Payer: COMMERCIAL

## 2023-08-25 ENCOUNTER — NON-APPOINTMENT (OUTPATIENT)
Age: 63
End: 2023-08-25

## 2023-08-26 PROCEDURE — 93298 REM INTERROG DEV EVAL SCRMS: CPT

## 2023-08-26 PROCEDURE — G2066: CPT

## 2023-09-23 ENCOUNTER — NON-APPOINTMENT (OUTPATIENT)
Age: 63
End: 2023-09-23

## 2023-09-26 ENCOUNTER — APPOINTMENT (OUTPATIENT)
Dept: ELECTROPHYSIOLOGY | Facility: CLINIC | Age: 63
End: 2023-09-26
Payer: COMMERCIAL

## 2023-09-26 ENCOUNTER — NON-APPOINTMENT (OUTPATIENT)
Age: 63
End: 2023-09-26

## 2023-09-26 PROCEDURE — G2066: CPT

## 2023-09-26 PROCEDURE — 93298 REM INTERROG DEV EVAL SCRMS: CPT

## 2023-10-02 ENCOUNTER — NON-APPOINTMENT (OUTPATIENT)
Age: 63
End: 2023-10-02

## 2023-10-05 ENCOUNTER — APPOINTMENT (OUTPATIENT)
Dept: VASCULAR SURGERY | Facility: CLINIC | Age: 63
End: 2023-10-05
Payer: COMMERCIAL

## 2023-10-05 VITALS — WEIGHT: 293 LBS | BODY MASS INDEX: 44.41 KG/M2 | HEIGHT: 68 IN

## 2023-10-05 VITALS — HEART RATE: 67 BPM | DIASTOLIC BLOOD PRESSURE: 84 MMHG | SYSTOLIC BLOOD PRESSURE: 129 MMHG

## 2023-10-05 PROCEDURE — 99213 OFFICE O/P EST LOW 20 MIN: CPT

## 2023-10-11 ENCOUNTER — APPOINTMENT (OUTPATIENT)
Dept: VASCULAR SURGERY | Facility: CLINIC | Age: 63
End: 2023-10-11
Payer: COMMERCIAL

## 2023-10-11 PROCEDURE — 93923 UPR/LXTR ART STDY 3+ LVLS: CPT

## 2023-10-11 PROCEDURE — 93971 EXTREMITY STUDY: CPT | Mod: RT

## 2023-10-21 RX ORDER — METOPROLOL TARTRATE 25 MG/1
25 TABLET, FILM COATED ORAL
Qty: 180 | Refills: 3 | Status: ACTIVE | COMMUNITY
Start: 2018-12-15 | End: 1900-01-01

## 2023-10-29 ENCOUNTER — NON-APPOINTMENT (OUTPATIENT)
Age: 63
End: 2023-10-29

## 2023-10-30 ENCOUNTER — NON-APPOINTMENT (OUTPATIENT)
Age: 63
End: 2023-10-30

## 2023-10-31 ENCOUNTER — APPOINTMENT (OUTPATIENT)
Dept: ELECTROPHYSIOLOGY | Facility: CLINIC | Age: 63
End: 2023-10-31
Payer: COMMERCIAL

## 2023-10-31 PROCEDURE — G2066: CPT | Mod: NC

## 2023-10-31 PROCEDURE — 93298 REM INTERROG DEV EVAL SCRMS: CPT

## 2023-11-02 ENCOUNTER — APPOINTMENT (OUTPATIENT)
Dept: ENDOCRINOLOGY | Facility: CLINIC | Age: 63
End: 2023-11-02
Payer: COMMERCIAL

## 2023-11-02 VITALS
SYSTOLIC BLOOD PRESSURE: 112 MMHG | HEART RATE: 52 BPM | OXYGEN SATURATION: 98 % | HEIGHT: 68 IN | DIASTOLIC BLOOD PRESSURE: 68 MMHG | BODY MASS INDEX: 44.41 KG/M2 | WEIGHT: 293 LBS

## 2023-11-02 DIAGNOSIS — R63.5 ABNORMAL WEIGHT GAIN: ICD-10-CM

## 2023-11-02 PROCEDURE — 99214 OFFICE O/P EST MOD 30 MIN: CPT

## 2023-11-02 RX ORDER — SEMAGLUTIDE 0.25 MG/.5ML
0.25 INJECTION, SOLUTION SUBCUTANEOUS
Qty: 3 | Refills: 0 | Status: DISCONTINUED | COMMUNITY
Start: 2023-07-20 | End: 2023-11-02

## 2023-11-07 ENCOUNTER — TRANSCRIPTION ENCOUNTER (OUTPATIENT)
Age: 63
End: 2023-11-07

## 2023-11-10 ENCOUNTER — NON-APPOINTMENT (OUTPATIENT)
Age: 63
End: 2023-11-10

## 2023-12-05 ENCOUNTER — TRANSCRIPTION ENCOUNTER (OUTPATIENT)
Age: 63
End: 2023-12-05

## 2023-12-05 ENCOUNTER — NON-APPOINTMENT (OUTPATIENT)
Age: 63
End: 2023-12-05

## 2023-12-05 ENCOUNTER — APPOINTMENT (OUTPATIENT)
Dept: ELECTROPHYSIOLOGY | Facility: CLINIC | Age: 63
End: 2023-12-05
Payer: COMMERCIAL

## 2023-12-05 PROCEDURE — 93298 REM INTERROG DEV EVAL SCRMS: CPT

## 2023-12-05 PROCEDURE — G2066: CPT

## 2023-12-20 RX ORDER — APIXABAN 5 MG/1
5 TABLET, FILM COATED ORAL
Qty: 180 | Refills: 3 | Status: ACTIVE | COMMUNITY
Start: 2019-11-12 | End: 1900-01-01

## 2024-01-10 ENCOUNTER — APPOINTMENT (OUTPATIENT)
Dept: ELECTROPHYSIOLOGY | Facility: CLINIC | Age: 64
End: 2024-01-10
Payer: COMMERCIAL

## 2024-01-10 ENCOUNTER — NON-APPOINTMENT (OUTPATIENT)
Age: 64
End: 2024-01-10

## 2024-01-11 PROCEDURE — 93298 REM INTERROG DEV EVAL SCRMS: CPT

## 2024-01-18 ENCOUNTER — APPOINTMENT (OUTPATIENT)
Dept: VASCULAR SURGERY | Facility: CLINIC | Age: 64
End: 2024-01-18
Payer: COMMERCIAL

## 2024-01-18 PROCEDURE — 99214 OFFICE O/P EST MOD 30 MIN: CPT

## 2024-01-18 NOTE — PHYSICAL EXAM
[JVD] : no jugular venous distention  [Normal Breath Sounds] : Normal breath sounds [Normal Rate and Rhythm] : normal rate and rhythm [Ankle Swelling (On Exam)] : present [Ankle Swelling On The Left] : moderate [Varicose Veins Of Lower Extremities] : bilaterally [] : bilaterally [Ankle Swelling Bilaterally] : severe [No Rash or Lesion] : No rash or lesion [Alert] : alert [Calm] : calm [de-identified] : appears well [de-identified] : No palpable cords.  No calf tenderness.

## 2024-01-18 NOTE — ASSESSMENT
[FreeTextEntry1] : At the patient's previous visit she underwent a duplex study which shows severe insufficiency in the saphenofemoral junction and throughout the anterior saphenous vein.  There were large varicosities throughout.  Patient with significant venous stasis ulceration.  Given these findings would recommend ablation along with a phlebectomy.  In the interim patient should continue with the compression stockings and elevation.  Patient should continue with Eliquis which will be discontinued prior to the procedure. [Arterial/Venous Disease] : arterial/venous disease

## 2024-01-18 NOTE — REVIEW OF SYSTEMS
[Fever] : no fever [Chills] : no chills [As Noted in HPI] : as noted in HPI [Lower Ext Edema] : lower extremity edema [Negative] : Heme/Lymph

## 2024-01-18 NOTE — HISTORY OF PRESENT ILLNESS
[FreeTextEntry1] : Patient is a 63-year-old female with history of left lower extremity venous stasis ulcer secondary to venous insufficiency s/p left greater saphenous vein ablation who presents to the office today for follow-up.  Patient reports persistent fatigue and swelling of the right lower extremity that is worse at the end of the day.  Patient endorses the use of compression stockings. Denies rest pain or claudication symptoms.  Denies present tissue loss or ulceration.

## 2024-01-25 ENCOUNTER — APPOINTMENT (OUTPATIENT)
Dept: ELECTROPHYSIOLOGY | Facility: CLINIC | Age: 64
End: 2024-01-25
Payer: COMMERCIAL

## 2024-01-25 ENCOUNTER — NON-APPOINTMENT (OUTPATIENT)
Age: 64
End: 2024-01-25

## 2024-01-25 VITALS — HEART RATE: 58 BPM | DIASTOLIC BLOOD PRESSURE: 72 MMHG | SYSTOLIC BLOOD PRESSURE: 135 MMHG

## 2024-01-25 PROCEDURE — 93285 PRGRMG DEV EVAL SCRMS IP: CPT

## 2024-01-30 ENCOUNTER — APPOINTMENT (OUTPATIENT)
Dept: CARDIOLOGY | Facility: CLINIC | Age: 64
End: 2024-01-30
Payer: COMMERCIAL

## 2024-01-30 VITALS
HEART RATE: 56 BPM | HEIGHT: 68 IN | BODY MASS INDEX: 44.41 KG/M2 | TEMPERATURE: 97.6 F | OXYGEN SATURATION: 98 % | DIASTOLIC BLOOD PRESSURE: 72 MMHG | WEIGHT: 293 LBS | SYSTOLIC BLOOD PRESSURE: 98 MMHG

## 2024-01-30 DIAGNOSIS — R06.02 SHORTNESS OF BREATH: ICD-10-CM

## 2024-01-30 DIAGNOSIS — N95.0 POSTMENOPAUSAL BLEEDING: ICD-10-CM

## 2024-01-30 DIAGNOSIS — Z00.00 ENCOUNTER FOR GENERAL ADULT MEDICAL EXAMINATION W/OUT ABNORMAL FINDINGS: ICD-10-CM

## 2024-01-30 DIAGNOSIS — I48.0 PAROXYSMAL ATRIAL FIBRILLATION: ICD-10-CM

## 2024-01-30 DIAGNOSIS — Z13.228 ENCOUNTER FOR SCREENING FOR OTHER METABOLIC DISORDERS: ICD-10-CM

## 2024-01-30 DIAGNOSIS — G47.33 OBSTRUCTIVE SLEEP APNEA (ADULT) (PEDIATRIC): ICD-10-CM

## 2024-01-30 DIAGNOSIS — E66.9 OVERWEIGHT: ICD-10-CM

## 2024-01-30 DIAGNOSIS — Z13.820 ENCOUNTER FOR SCREENING FOR OSTEOPOROSIS: ICD-10-CM

## 2024-01-30 DIAGNOSIS — Z12.83 ENCOUNTER FOR SCREENING FOR MALIGNANT NEOPLASM OF SKIN: ICD-10-CM

## 2024-01-30 DIAGNOSIS — E66.3 OVERWEIGHT: ICD-10-CM

## 2024-01-30 DIAGNOSIS — Z12.39 ENCOUNTER FOR OTHER SCREENING FOR MALIGNANT NEOPLASM OF BREAST: ICD-10-CM

## 2024-01-30 DIAGNOSIS — Z12.11 ENCOUNTER FOR SCREENING FOR MALIGNANT NEOPLASM OF COLON: ICD-10-CM

## 2024-01-30 DIAGNOSIS — R00.2 PALPITATIONS: ICD-10-CM

## 2024-01-30 PROCEDURE — 99396 PREV VISIT EST AGE 40-64: CPT

## 2024-01-30 PROCEDURE — 93000 ELECTROCARDIOGRAM COMPLETE: CPT

## 2024-01-30 NOTE — HISTORY OF PRESENT ILLNESS
[FreeTextEntry1] : This is a 64 y/o female with a pmhx of  PAF, SIMA, OA, Eczema, obesity, and GERD who presents today for her annual physical exam. Recent ILR data showing Afib episodes lasting > 3 hours, was advised by EPS to take extra metoprolol during symptomatic episodes, advised for ablation. She is now taking metoprolol 12.5 mg po qam and 25 mg po qpm. She does report feeling SOB, dizziness and palpitations when in Afib. Patient denies chest pain, syncope, changes in bowel/bladder habits or appetite.  Patient reports she had PMB, seen by GYN and workup was negative.  (4) no limitation

## 2024-01-30 NOTE — PHYSICAL EXAM

## 2024-02-01 LAB
25(OH)D3 SERPL-MCNC: 27.4 NG/ML
ALBUMIN SERPL ELPH-MCNC: 4.3 G/DL
ALP BLD-CCNC: 102 U/L
ALT SERPL-CCNC: 12 U/L
ANION GAP SERPL CALC-SCNC: 10 MMOL/L
APPEARANCE: CLEAR
AST SERPL-CCNC: 19 U/L
BACTERIA: NEGATIVE /HPF
BASOPHILS # BLD AUTO: 0.02 K/UL
BASOPHILS NFR BLD AUTO: 0.3 %
BILIRUB SERPL-MCNC: 0.5 MG/DL
BILIRUBIN URINE: NEGATIVE
BLOOD URINE: NEGATIVE
BUN SERPL-MCNC: 12 MG/DL
CALCIUM SERPL-MCNC: 9.8 MG/DL
CAST: 0 /LPF
CHLORIDE SERPL-SCNC: 103 MMOL/L
CHOLEST SERPL-MCNC: 229 MG/DL
CO2 SERPL-SCNC: 27 MMOL/L
COLOR: YELLOW
CREAT SERPL-MCNC: 0.76 MG/DL
EGFR: 88 ML/MIN/1.73M2
EOSINOPHIL # BLD AUTO: 0.13 K/UL
EOSINOPHIL NFR BLD AUTO: 1.7 %
EPITHELIAL CELLS: 3 /HPF
ESTIMATED AVERAGE GLUCOSE: 103 MG/DL
GLUCOSE QUALITATIVE U: NEGATIVE MG/DL
GLUCOSE SERPL-MCNC: 94 MG/DL
HBA1C MFR BLD HPLC: 5.2 %
HCT VFR BLD CALC: 43 %
HDLC SERPL-MCNC: 59 MG/DL
HGB BLD-MCNC: 13.4 G/DL
IMM GRANULOCYTES NFR BLD AUTO: 0.3 %
KETONES URINE: NEGATIVE MG/DL
LDLC SERPL CALC-MCNC: 152 MG/DL
LEUKOCYTE ESTERASE URINE: NEGATIVE
LYMPHOCYTES # BLD AUTO: 1.55 K/UL
LYMPHOCYTES NFR BLD AUTO: 20.8 %
MAGNESIUM SERPL-MCNC: 2.2 MG/DL
MAN DIFF?: NORMAL
MCHC RBC-ENTMCNC: 28.5 PG
MCHC RBC-ENTMCNC: 31.2 GM/DL
MCV RBC AUTO: 91.5 FL
MICROSCOPIC-UA: NORMAL
MONOCYTES # BLD AUTO: 0.52 K/UL
MONOCYTES NFR BLD AUTO: 7 %
NEUTROPHILS # BLD AUTO: 5.21 K/UL
NEUTROPHILS NFR BLD AUTO: 69.9 %
NITRITE URINE: NEGATIVE
NONHDLC SERPL-MCNC: 170 MG/DL
PH URINE: 6.5
PHOSPHATE SERPL-MCNC: 4.2 MG/DL
PLATELET # BLD AUTO: 297 K/UL
POTASSIUM SERPL-SCNC: 5.3 MMOL/L
PROT SERPL-MCNC: 7.6 G/DL
PROTEIN URINE: NEGATIVE MG/DL
RBC # BLD: 4.7 M/UL
RBC # FLD: 13.7 %
RED BLOOD CELLS URINE: 1 /HPF
SODIUM SERPL-SCNC: 140 MMOL/L
SPECIFIC GRAVITY URINE: 1.03
T4 FREE SERPL-MCNC: 1.1 NG/DL
TRIGL SERPL-MCNC: 107 MG/DL
TSH SERPL-ACNC: 1.08 UIU/ML
UROBILINOGEN URINE: 0.2 MG/DL
WBC # FLD AUTO: 7.45 K/UL
WHITE BLOOD CELLS URINE: 2 /HPF

## 2024-02-09 ENCOUNTER — APPOINTMENT (OUTPATIENT)
Dept: CARDIOLOGY | Facility: CLINIC | Age: 64
End: 2024-02-09
Payer: COMMERCIAL

## 2024-02-09 PROCEDURE — 93015 CV STRESS TEST SUPVJ I&R: CPT

## 2024-02-09 PROCEDURE — 78452 HT MUSCLE IMAGE SPECT MULT: CPT

## 2024-02-09 PROCEDURE — A9500: CPT

## 2024-02-09 PROCEDURE — 93306 TTE W/DOPPLER COMPLETE: CPT

## 2024-02-29 ENCOUNTER — APPOINTMENT (OUTPATIENT)
Dept: ELECTROPHYSIOLOGY | Facility: CLINIC | Age: 64
End: 2024-02-29
Payer: COMMERCIAL

## 2024-02-29 ENCOUNTER — NON-APPOINTMENT (OUTPATIENT)
Age: 64
End: 2024-02-29

## 2024-02-29 PROCEDURE — 93298 REM INTERROG DEV EVAL SCRMS: CPT

## 2024-04-02 RX ORDER — FAMOTIDINE 20 MG/1
20 TABLET, FILM COATED ORAL
Qty: 90 | Refills: 3 | Status: ACTIVE | COMMUNITY
Start: 2024-04-02 | End: 1900-01-01

## 2024-04-02 RX ORDER — OMEPRAZOLE 40 MG/1
40 CAPSULE, DELAYED RELEASE ORAL
Qty: 90 | Refills: 3 | Status: ACTIVE | COMMUNITY
Start: 2024-04-02 | End: 1900-01-01

## 2024-04-03 ENCOUNTER — APPOINTMENT (OUTPATIENT)
Dept: ELECTROPHYSIOLOGY | Facility: CLINIC | Age: 64
End: 2024-04-03

## 2024-04-09 RX ORDER — FAMOTIDINE 40 MG/1
40 TABLET, FILM COATED ORAL
Qty: 90 | Refills: 3 | Status: ACTIVE | COMMUNITY
Start: 2024-04-09 | End: 1900-01-01

## 2024-04-11 ENCOUNTER — APPOINTMENT (OUTPATIENT)
Dept: ENDOCRINOLOGY | Facility: CLINIC | Age: 64
End: 2024-04-11
Payer: COMMERCIAL

## 2024-04-11 VITALS
HEART RATE: 66 BPM | DIASTOLIC BLOOD PRESSURE: 80 MMHG | SYSTOLIC BLOOD PRESSURE: 90 MMHG | BODY MASS INDEX: 44.41 KG/M2 | WEIGHT: 293 LBS | HEIGHT: 68 IN | OXYGEN SATURATION: 97 % | TEMPERATURE: 97.7 F

## 2024-04-11 DIAGNOSIS — E78.5 HYPERLIPIDEMIA, UNSPECIFIED: ICD-10-CM

## 2024-04-11 DIAGNOSIS — E66.01 MORBID (SEVERE) OBESITY DUE TO EXCESS CALORIES: ICD-10-CM

## 2024-04-11 DIAGNOSIS — K21.9 GASTRO-ESOPHAGEAL REFLUX DISEASE W/OUT ESOPHAGITIS: ICD-10-CM

## 2024-04-11 DIAGNOSIS — E55.9 VITAMIN D DEFICIENCY, UNSPECIFIED: ICD-10-CM

## 2024-04-11 PROCEDURE — 99214 OFFICE O/P EST MOD 30 MIN: CPT

## 2024-04-11 RX ORDER — MOLNUPIRAVIR 200 MG/1
200 CAPSULE ORAL
Qty: 40 | Refills: 0 | Status: DISCONTINUED | COMMUNITY
Start: 2023-08-08 | End: 2024-04-11

## 2024-04-11 NOTE — HISTORY OF PRESENT ILLNESS
[FreeTextEntry1] : Ms. FLOWER  is a 63 year old female  who returns  with regard to a history of medical management of obesity Patient has hx of two bariatric surgeries. Has tried various diets.  Had lap band then 10 yrs alter-about 4 yrs ago-had gastric sleeve Lost about 50 lbs both times but then weight returned both times. In pasts has tried Topamax.  Has been taking Wegovy now at 1.7 mg weekly. Has tolerated well to date.  She does note appetite suppression but has not seen much weight loss of late. However, she has lost weight since starting the medication.  Current weight: 326 lbs, previously 333 lbs in November 2023, previously 330 lbs in July 2023, previously 348 lbs in April 2023.  She has been on 1.7 mg dose since 1/31/24.  She does feel that Wegovy has worsened acid reflux - pending f/u with GI. On famotidine 40 mg BID   At age 8 in hospital with glomerulonephritis-went on bed rest -then weight gain Too, FH -overweight. Does walk, completes 0914-7571 steps per day, uses [employer] watch.  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   Patient used to take Qsymia (?) as well as another oral weight loss medication after her second bariatric surgery around 2019. D/C'd due to ineffectiveness. No s/e from these meds, but no weight loss noted.   Additional medical history includes that of paroxysmal A-Fib, taking Eliquis and metoprolol.  Also hx of GERD, taking Pepcid and omeprazole. Also taking vitamin D+calcium gummy supplement

## 2024-04-11 NOTE — ADDENDUM
[FreeTextEntry1] : Blood will be drawn in office today.   This note was written by Lizzy Sofia on 04/11/2024 acting as scribe for Dr. Ari Rosa . I, Dr. Ari Rosa, have read and attest that all the information, medical decision making and discharge instructions within are true and accurate.

## 2024-04-16 LAB
25(OH)D3 SERPL-MCNC: 31.3 NG/ML
ALBUMIN SERPL ELPH-MCNC: 4.2 G/DL
ALP BLD-CCNC: 109 U/L
ALT SERPL-CCNC: 19 U/L
ANION GAP SERPL CALC-SCNC: 11 MMOL/L
APPEARANCE: CLEAR
AST SERPL-CCNC: 21 U/L
BACTERIA: NEGATIVE
BASOPHILS # BLD AUTO: 0.02 K/UL
BASOPHILS NFR BLD AUTO: 0.3 %
BILIRUB SERPL-MCNC: 0.5 MG/DL
BILIRUBIN URINE: NEGATIVE
BLOOD URINE: NEGATIVE
BUN SERPL-MCNC: 15 MG/DL
CALCIUM SERPL-MCNC: 9.8 MG/DL
CHLORIDE SERPL-SCNC: 103 MMOL/L
CHOLEST SERPL-MCNC: 232 MG/DL
CO2 SERPL-SCNC: 26 MMOL/L
COLOR: YELLOW
CREAT SERPL-MCNC: 0.73 MG/DL
EGFR: 93 ML/MIN/1.73M2
EOSINOPHIL # BLD AUTO: 0.12 K/UL
EOSINOPHIL NFR BLD AUTO: 1.8 %
ESTIMATED AVERAGE GLUCOSE: 108 MG/DL
GLUCOSE QUALITATIVE U: NEGATIVE
GLUCOSE SERPL-MCNC: 96 MG/DL
HBA1C MFR BLD HPLC: 5.4 %
HCT VFR BLD CALC: 45.2 %
HDLC SERPL-MCNC: 64 MG/DL
HGB BLD-MCNC: 14.4 G/DL
HYALINE CASTS: 0 /LPF
IMM GRANULOCYTES NFR BLD AUTO: 0.1 %
KETONES URINE: NEGATIVE
LDLC SERPL CALC-MCNC: 149 MG/DL
LEUKOCYTE ESTERASE URINE: ABNORMAL
LYMPHOCYTES # BLD AUTO: 1.76 K/UL
LYMPHOCYTES NFR BLD AUTO: 26.1 %
MAGNESIUM SERPL-MCNC: 2 MG/DL
MAN DIFF?: NORMAL
MCHC RBC-ENTMCNC: 29.3 PG
MCHC RBC-ENTMCNC: 31.9 GM/DL
MCV RBC AUTO: 91.9 FL
MICROSCOPIC-UA: NORMAL
MONOCYTES # BLD AUTO: 0.51 K/UL
MONOCYTES NFR BLD AUTO: 7.6 %
NEUTROPHILS # BLD AUTO: 4.33 K/UL
NEUTROPHILS NFR BLD AUTO: 64.1 %
NITRITE URINE: NEGATIVE
NONHDLC SERPL-MCNC: 168 MG/DL
PH URINE: 6.5
PHOSPHATE SERPL-MCNC: 3.7 MG/DL
PLATELET # BLD AUTO: 261 K/UL
POTASSIUM SERPL-SCNC: 5.3 MMOL/L
PROT SERPL-MCNC: 7.2 G/DL
PROTEIN URINE: NORMAL
RBC # BLD: 4.92 M/UL
RBC # FLD: 13.9 %
RED BLOOD CELLS URINE: 3 /HPF
SODIUM SERPL-SCNC: 140 MMOL/L
SPECIFIC GRAVITY URINE: 1.03
SQUAMOUS EPITHELIAL CELLS: 2 /HPF
T4 FREE SERPL-MCNC: 1.1 NG/DL
TRIGL SERPL-MCNC: 97 MG/DL
TSH SERPL-ACNC: 1.24 UIU/ML
UROBILINOGEN URINE: NORMAL
WBC # FLD AUTO: 6.75 K/UL
WHITE BLOOD CELLS URINE: 31 /HPF

## 2024-04-19 ENCOUNTER — NON-APPOINTMENT (OUTPATIENT)
Age: 64
End: 2024-04-19

## 2024-04-19 ENCOUNTER — APPOINTMENT (OUTPATIENT)
Dept: ORTHOPEDIC SURGERY | Facility: CLINIC | Age: 64
End: 2024-04-19
Payer: COMMERCIAL

## 2024-04-19 ENCOUNTER — APPOINTMENT (OUTPATIENT)
Dept: ELECTROPHYSIOLOGY | Facility: CLINIC | Age: 64
End: 2024-04-19
Payer: COMMERCIAL

## 2024-04-19 DIAGNOSIS — M17.12 UNILATERAL PRIMARY OSTEOARTHRITIS, LEFT KNEE: ICD-10-CM

## 2024-04-19 DIAGNOSIS — M17.11 UNILATERAL PRIMARY OSTEOARTHRITIS, RIGHT KNEE: ICD-10-CM

## 2024-04-19 PROCEDURE — 93298 REM INTERROG DEV EVAL SCRMS: CPT

## 2024-04-19 PROCEDURE — 99214 OFFICE O/P EST MOD 30 MIN: CPT | Mod: 25

## 2024-04-19 PROCEDURE — 20610 DRAIN/INJ JOINT/BURSA W/O US: CPT | Mod: RT

## 2024-05-01 ENCOUNTER — TRANSCRIPTION ENCOUNTER (OUTPATIENT)
Age: 64
End: 2024-05-01

## 2024-05-02 ENCOUNTER — TRANSCRIPTION ENCOUNTER (OUTPATIENT)
Age: 64
End: 2024-05-02

## 2024-05-07 ENCOUNTER — TRANSCRIPTION ENCOUNTER (OUTPATIENT)
Age: 64
End: 2024-05-07

## 2024-05-08 ENCOUNTER — APPOINTMENT (OUTPATIENT)
Dept: ELECTROPHYSIOLOGY | Facility: CLINIC | Age: 64
End: 2024-05-08

## 2024-05-16 ENCOUNTER — APPOINTMENT (OUTPATIENT)
Dept: GASTROENTEROLOGY | Facility: CLINIC | Age: 64
End: 2024-05-16
Payer: COMMERCIAL

## 2024-05-16 VITALS
HEART RATE: 60 BPM | BODY MASS INDEX: 44.41 KG/M2 | WEIGHT: 293 LBS | DIASTOLIC BLOOD PRESSURE: 75 MMHG | OXYGEN SATURATION: 99 % | HEIGHT: 68 IN | SYSTOLIC BLOOD PRESSURE: 111 MMHG

## 2024-05-16 DIAGNOSIS — K20.90 ESOPHAGITIS, UNSPECIFIED WITHOUT BLEEDING: ICD-10-CM

## 2024-05-16 DIAGNOSIS — K21.9 GASTRO-ESOPHAGEAL REFLUX DISEASE W/OUT ESOPHAGITIS: ICD-10-CM

## 2024-05-16 PROCEDURE — 99214 OFFICE O/P EST MOD 30 MIN: CPT

## 2024-05-16 NOTE — PHYSICAL EXAM

## 2024-05-16 NOTE — HISTORY OF PRESENT ILLNESS
[FreeTextEntry1] : Chief complaint: indigestion, abdominal pain   HPI: Patient presents for evaluation and management of mutiple complex Gastrointestinal and medical signs  and symptoms. Patient with acute exacerbation of two ongoing medical problems: acid reflux and spastic colon.  Patient with intermittent, crampy llq abdominal pain. There are no exacerbating or ameliorating factors. The abdominal pain is non-radiating. Associated symptoms include nausea.   Patient also with acute exacerbation of gerd. Ever since starting Wegovy, patient with nausea, and indigestion which is worse at bedtime..  New prescription sent to pharmacy, omeprazole 40 mg po bid, [facility]

## 2024-05-16 NOTE — ASSESSMENT
[FreeTextEntry1] : Patient presents with acute exacerbation of gerd as well as spastic colitis   new prescription sent to pharmacy for omeprazole 40 mg po bid   gerd diet reviwed with patient   consider anticholintergics for spastic colitis   moderate degree complexity of medical decision making involved in this patient encounter

## 2024-05-21 ENCOUNTER — APPOINTMENT (OUTPATIENT)
Dept: MAMMOGRAPHY | Facility: IMAGING CENTER | Age: 64
End: 2024-05-21
Payer: COMMERCIAL

## 2024-05-21 ENCOUNTER — OUTPATIENT (OUTPATIENT)
Dept: OUTPATIENT SERVICES | Facility: HOSPITAL | Age: 64
LOS: 1 days | End: 2024-05-21
Payer: COMMERCIAL

## 2024-05-21 DIAGNOSIS — Z00.8 ENCOUNTER FOR OTHER GENERAL EXAMINATION: ICD-10-CM

## 2024-05-21 DIAGNOSIS — Z95.818 PRESENCE OF OTHER CARDIAC IMPLANTS AND GRAFTS: Chronic | ICD-10-CM

## 2024-05-21 DIAGNOSIS — Z98.84 BARIATRIC SURGERY STATUS: Chronic | ICD-10-CM

## 2024-05-21 DIAGNOSIS — Z98.890 OTHER SPECIFIED POSTPROCEDURAL STATES: Chronic | ICD-10-CM

## 2024-05-21 PROCEDURE — 77063 BREAST TOMOSYNTHESIS BI: CPT | Mod: 26

## 2024-05-21 PROCEDURE — 77067 SCR MAMMO BI INCL CAD: CPT | Mod: 26

## 2024-05-21 PROCEDURE — 77063 BREAST TOMOSYNTHESIS BI: CPT

## 2024-05-21 PROCEDURE — 77067 SCR MAMMO BI INCL CAD: CPT

## 2024-05-21 RX ORDER — OMEPRAZOLE 40 MG/1
40 CAPSULE, DELAYED RELEASE ORAL TWICE DAILY
Qty: 180 | Refills: 3 | Status: ACTIVE | COMMUNITY
Start: 2024-05-16 | End: 1900-01-01

## 2024-05-24 ENCOUNTER — NON-APPOINTMENT (OUTPATIENT)
Age: 64
End: 2024-05-24

## 2024-05-24 ENCOUNTER — APPOINTMENT (OUTPATIENT)
Dept: ELECTROPHYSIOLOGY | Facility: CLINIC | Age: 64
End: 2024-05-24
Payer: COMMERCIAL

## 2024-05-24 PROCEDURE — 93298 REM INTERROG DEV EVAL SCRMS: CPT

## 2024-06-11 RX ORDER — SEMAGLUTIDE 2.4 MG/.75ML
2.4 INJECTION, SOLUTION SUBCUTANEOUS
Qty: 3 | Refills: 0 | Status: ACTIVE | COMMUNITY
Start: 2023-04-14 | End: 1900-01-01

## 2024-06-21 LAB
ALBUMIN SERPL ELPH-MCNC: 4.2 G/DL
ALP BLD-CCNC: 104 U/L
ALT SERPL-CCNC: 12 U/L
AST SERPL-CCNC: 19 U/L
BILIRUB DIRECT SERPL-MCNC: 0.1 MG/DL
BILIRUB INDIRECT SERPL-MCNC: 0.2 MG/DL
BILIRUB SERPL-MCNC: 0.3 MG/DL
CHOLEST SERPL-MCNC: 224 MG/DL
CK SERPL-CCNC: 47 U/L
HDLC SERPL-MCNC: 61 MG/DL
LDLC SERPL CALC-MCNC: 149 MG/DL
NONHDLC SERPL-MCNC: 163 MG/DL
PROT SERPL-MCNC: 6.9 G/DL
TRIGL SERPL-MCNC: 77 MG/DL

## 2024-06-24 DIAGNOSIS — I83.90 ASYMPTOMATIC VARICOSE VEINS OF UNSPECIFIED LOWER EXTREMITY: ICD-10-CM

## 2024-06-24 DIAGNOSIS — I83.899 VARICOSE VEINS OF UNSPECIFIED LOWER EXTREMITY WITH OTHER COMPLICATIONS: ICD-10-CM

## 2024-06-24 LAB
ANION GAP SERPL CALC-SCNC: 13 MMOL/L
BUN SERPL-MCNC: 13 MG/DL
CALCIUM SERPL-MCNC: 9.4 MG/DL
CHLORIDE SERPL-SCNC: 104 MMOL/L
CO2 SERPL-SCNC: 21 MMOL/L
CREAT SERPL-MCNC: 0.69 MG/DL
EGFR: 97 ML/MIN/1.73M2
GLUCOSE SERPL-MCNC: 85 MG/DL
HCT VFR BLD CALC: 41.1 %
HGB BLD-MCNC: 13.1 G/DL
MCHC RBC-ENTMCNC: 28.9 PG
MCHC RBC-ENTMCNC: 31.9 GM/DL
MCV RBC AUTO: 90.7 FL
PLATELET # BLD AUTO: 285 K/UL
POTASSIUM SERPL-SCNC: 7 MMOL/L
RBC # BLD: 4.53 M/UL
RBC # FLD: 14.4 %
SODIUM SERPL-SCNC: 138 MMOL/L
WBC # FLD AUTO: 7.3 K/UL

## 2024-06-28 ENCOUNTER — NON-APPOINTMENT (OUTPATIENT)
Age: 64
End: 2024-06-28

## 2024-06-28 ENCOUNTER — APPOINTMENT (OUTPATIENT)
Dept: ELECTROPHYSIOLOGY | Facility: CLINIC | Age: 64
End: 2024-06-28
Payer: COMMERCIAL

## 2024-06-28 PROCEDURE — 93298 REM INTERROG DEV EVAL SCRMS: CPT

## 2024-07-18 ENCOUNTER — APPOINTMENT (OUTPATIENT)
Dept: ENDOVASCULAR SURGERY | Facility: CLINIC | Age: 64
End: 2024-07-18
Payer: COMMERCIAL

## 2024-07-18 VITALS
WEIGHT: 293 LBS | RESPIRATION RATE: 16 BRPM | HEIGHT: 68 IN | OXYGEN SATURATION: 95 % | DIASTOLIC BLOOD PRESSURE: 76 MMHG | TEMPERATURE: 97.7 F | BODY MASS INDEX: 44.41 KG/M2 | HEART RATE: 60 BPM | SYSTOLIC BLOOD PRESSURE: 128 MMHG

## 2024-07-18 DIAGNOSIS — I83.90 ASYMPTOMATIC VARICOSE VEINS OF UNSPECIFIED LOWER EXTREMITY: ICD-10-CM

## 2024-07-18 PROCEDURE — 36475 ENDOVENOUS RF 1ST VEIN: CPT | Mod: RT

## 2024-07-18 PROCEDURE — 36476 ENDOVENOUS RF VEIN ADD-ON: CPT | Mod: RT

## 2024-07-23 ENCOUNTER — APPOINTMENT (OUTPATIENT)
Dept: VASCULAR SURGERY | Facility: CLINIC | Age: 64
End: 2024-07-23
Payer: COMMERCIAL

## 2024-07-23 PROCEDURE — 93971 EXTREMITY STUDY: CPT | Mod: RT

## 2024-07-23 PROCEDURE — 99024 POSTOP FOLLOW-UP VISIT: CPT

## 2024-07-23 NOTE — REASON FOR VISIT
[de-identified] : Radiofrequency ablation of right anterior accessory saphenous and greater saphenous veins [de-identified] : Status post ablation.  Denies fever or chills.  Patient reports pain is well-controlled.  Patient with concern of residual varicosity in the right thigh region.

## 2024-07-23 NOTE — DISCUSSION/SUMMARY
[FreeTextEntry1] : Duplex demonstrates successful ablation of right anterior accessory saphenous and greater saphenous veins with no evidence of DVT.  Patient to follow-up in 1 month.

## 2024-08-02 ENCOUNTER — APPOINTMENT (OUTPATIENT)
Dept: ELECTROPHYSIOLOGY | Facility: CLINIC | Age: 64
End: 2024-08-02

## 2024-08-02 ENCOUNTER — NON-APPOINTMENT (OUTPATIENT)
Age: 64
End: 2024-08-02

## 2024-08-02 PROCEDURE — 93298 REM INTERROG DEV EVAL SCRMS: CPT

## 2024-08-20 ENCOUNTER — APPOINTMENT (OUTPATIENT)
Dept: VASCULAR SURGERY | Facility: CLINIC | Age: 64
End: 2024-08-20

## 2024-08-20 PROCEDURE — 99024 POSTOP FOLLOW-UP VISIT: CPT

## 2024-08-21 ENCOUNTER — NON-APPOINTMENT (OUTPATIENT)
Age: 64
End: 2024-08-21

## 2024-08-21 NOTE — REASON FOR VISIT
[de-identified] : Radiofrequency ablation of right anterior accessory saphenous and greater saphenous veins [de-identified] : Status post ablation.  Denies fever or chills.  Patient reports pain is well-controlled.  Patient with concern of residual varicosity in the right thigh region.

## 2024-08-22 ENCOUNTER — APPOINTMENT (OUTPATIENT)
Dept: ORTHOPEDIC SURGERY | Facility: CLINIC | Age: 64
End: 2024-08-22
Payer: COMMERCIAL

## 2024-08-22 VITALS — HEIGHT: 68 IN | BODY MASS INDEX: 44.41 KG/M2 | WEIGHT: 293 LBS

## 2024-08-22 DIAGNOSIS — M17.12 UNILATERAL PRIMARY OSTEOARTHRITIS, LEFT KNEE: ICD-10-CM

## 2024-08-22 DIAGNOSIS — M17.11 UNILATERAL PRIMARY OSTEOARTHRITIS, RIGHT KNEE: ICD-10-CM

## 2024-08-22 PROCEDURE — 20610 DRAIN/INJ JOINT/BURSA W/O US: CPT | Mod: 50

## 2024-08-22 PROCEDURE — 99213 OFFICE O/P EST LOW 20 MIN: CPT | Mod: 25

## 2024-08-29 ENCOUNTER — TRANSCRIPTION ENCOUNTER (OUTPATIENT)
Age: 64
End: 2024-08-29

## 2024-09-03 ENCOUNTER — NON-APPOINTMENT (OUTPATIENT)
Age: 64
End: 2024-09-03

## 2024-09-03 ENCOUNTER — APPOINTMENT (OUTPATIENT)
Dept: ELECTROPHYSIOLOGY | Facility: CLINIC | Age: 64
End: 2024-09-03
Payer: COMMERCIAL

## 2024-09-03 PROCEDURE — 93298 REM INTERROG DEV EVAL SCRMS: CPT

## 2024-09-13 ENCOUNTER — TRANSCRIPTION ENCOUNTER (OUTPATIENT)
Age: 64
End: 2024-09-13

## 2024-09-19 ENCOUNTER — APPOINTMENT (OUTPATIENT)
Dept: ENDOCRINOLOGY | Facility: CLINIC | Age: 64
End: 2024-09-19

## 2024-09-19 VITALS
BODY MASS INDEX: 44.41 KG/M2 | OXYGEN SATURATION: 97 % | HEART RATE: 65 BPM | HEIGHT: 68 IN | DIASTOLIC BLOOD PRESSURE: 65 MMHG | SYSTOLIC BLOOD PRESSURE: 112 MMHG | WEIGHT: 293 LBS

## 2024-09-19 DIAGNOSIS — E66.3 OVERWEIGHT: ICD-10-CM

## 2024-09-19 DIAGNOSIS — E66.9 OVERWEIGHT: ICD-10-CM

## 2024-09-19 DIAGNOSIS — E55.9 VITAMIN D DEFICIENCY, UNSPECIFIED: ICD-10-CM

## 2024-09-19 DIAGNOSIS — E78.5 HYPERLIPIDEMIA, UNSPECIFIED: ICD-10-CM

## 2024-09-19 DIAGNOSIS — I48.91 UNSPECIFIED ATRIAL FIBRILLATION: ICD-10-CM

## 2024-09-19 PROCEDURE — 99214 OFFICE O/P EST MOD 30 MIN: CPT

## 2024-09-20 LAB
25(OH)D3 SERPL-MCNC: 29.3 NG/ML
ALBUMIN SERPL ELPH-MCNC: 4.3 G/DL
ALP BLD-CCNC: 99 U/L
ALT SERPL-CCNC: 13 U/L
ANION GAP SERPL CALC-SCNC: 10 MMOL/L
APO B SERPL-MCNC: 92 MG/DL
AST SERPL-CCNC: 17 U/L
BILIRUB SERPL-MCNC: 0.5 MG/DL
BUN SERPL-MCNC: 13 MG/DL
CALCIUM SERPL-MCNC: 9.3 MG/DL
CHLORIDE SERPL-SCNC: 106 MMOL/L
CHOLEST SERPL-MCNC: 211 MG/DL
CO2 SERPL-SCNC: 27 MMOL/L
CREAT SERPL-MCNC: 0.79 MG/DL
EGFR: 83 ML/MIN/1.73M2
ESTIMATED AVERAGE GLUCOSE: 103 MG/DL
GLUCOSE SERPL-MCNC: 82 MG/DL
HBA1C MFR BLD HPLC: 5.2 %
HDLC SERPL-MCNC: 65 MG/DL
LDLC SERPL DIRECT ASSAY-MCNC: 131 MG/DL
POTASSIUM SERPL-SCNC: 4.8 MMOL/L
PROT SERPL-MCNC: 6.8 G/DL
SODIUM SERPL-SCNC: 143 MMOL/L
T3FREE SERPL-MCNC: 2.57 PG/ML
T4 FREE SERPL-MCNC: 1.1 NG/DL
TRIGL SERPL-MCNC: 70 MG/DL
TSH SERPL-ACNC: 1.2 UIU/ML

## 2024-09-26 NOTE — HISTORY OF PRESENT ILLNESS
[FreeTextEntry1] : Ms. FLOWER is a 63-year-old female who returns with regard to a history of medical management of Obesity. Contiues on Wegovy 2.4 mg weekly.  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 Went on bed rest then weight gain Too, FHx overweight.  Patient has hx of two bariatric surgeries. She had lap band surgery, then 10 yrs after (about 5 yrs ago) had gastric sleeve. She lost about 50 lbs both times, but subsequently regained weight over the years.  Has tried various diets. In pasts has tried Topamax.  Recent steroid injections knees  Had tx for varicose veins legs.  Gel injections id not help in past. Wals at least 7,000 steps daily.  Patient used to take Qsymia (?) as well as another oral weight loss medication after her second bariatric surgery around 2019. D/C'd due to ineffectiveness. No s/e from these meds, but no weight loss noted.   Has been taking Wegovy, now at 2.4mg weekly. Has tolerated well to date. Had 5 doses to date.  Reflux better with omeprazole bid per Dr. Islas-her Gi physicain She does note appetite suppression but has not seen much weight loss of late. However, she has lost weight since starting the medication.  Current weight: ??? lbs, previously 348 lbs in April 2023.   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   Additional medical history includes that of paroxysmal A-Fib, taking Eliquis and metoprolol.  Also hx of GERD, taking Pepcid and omeprazole.  Also taking vitamin D+calcium gummy supplement

## 2024-09-26 NOTE — HISTORY OF PRESENT ILLNESS
[FreeTextEntry1] : Ms. FLOWER is a 63-year-old female who returns with regard to a history of medical management of Obesity. Contiues on Wegovy 2.4 mg weekly.  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 Went on bed rest then weight gain Too, FHx overweight.  Patient has hx of two bariatric surgeries. She had lap band surgery, then 10 yrs after (about 5 yrs ago) had gastric sleeve. She lost about 50 lbs both times, but subsequently regained weight over the years.  Has tried various diets. In pasts has tried Topamax.  Recent steroid injections knees  Had tx for varicose veins legs.  Gel injections id not help in past. Wals at least 7,000 steps daily.  Patient used to take Qsymia (?) as well as another oral weight loss medication after her second bariatric surgery around 2019. D/C'd due to ineffectiveness. No s/e from these meds, but no weight loss noted.   Has been taking Wegovy, now at 2.4mg weekly. Has tolerated well to date. Had 5 doses to date.  Reflux better with omeprazole bid per Dr. Islas-her Gi physicain She does note appetite suppression but has not seen much weight loss of late. However, she has lost weight since starting the medication.  Current weight: ??? lbs, previously 348 lbs in April 2023.   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   Additional medical history includes that of paroxysmal A-Fib, taking Eliquis and metoprolol.  Also hx of GERD, taking Pepcid and omeprazole.  Also taking vitamin D+calcium gummy supplement

## 2024-10-07 ENCOUNTER — NON-APPOINTMENT (OUTPATIENT)
Age: 64
End: 2024-10-07

## 2024-10-07 ENCOUNTER — APPOINTMENT (OUTPATIENT)
Dept: ELECTROPHYSIOLOGY | Facility: CLINIC | Age: 64
End: 2024-10-07
Payer: COMMERCIAL

## 2024-10-07 PROCEDURE — 93298 REM INTERROG DEV EVAL SCRMS: CPT

## 2024-10-24 ENCOUNTER — RESULT REVIEW (OUTPATIENT)
Age: 64
End: 2024-10-24

## 2024-10-29 ENCOUNTER — TRANSCRIPTION ENCOUNTER (OUTPATIENT)
Age: 64
End: 2024-10-29

## 2024-11-07 ENCOUNTER — NON-APPOINTMENT (OUTPATIENT)
Age: 64
End: 2024-11-07

## 2024-11-07 ENCOUNTER — APPOINTMENT (OUTPATIENT)
Dept: ELECTROPHYSIOLOGY | Facility: CLINIC | Age: 64
End: 2024-11-07
Payer: COMMERCIAL

## 2024-11-07 VITALS
BODY MASS INDEX: 44.41 KG/M2 | HEIGHT: 68 IN | OXYGEN SATURATION: 97 % | HEART RATE: 60 BPM | DIASTOLIC BLOOD PRESSURE: 73 MMHG | SYSTOLIC BLOOD PRESSURE: 126 MMHG | WEIGHT: 293 LBS

## 2024-11-07 DIAGNOSIS — G47.33 OBSTRUCTIVE SLEEP APNEA (ADULT) (PEDIATRIC): ICD-10-CM

## 2024-11-07 DIAGNOSIS — I48.0 PAROXYSMAL ATRIAL FIBRILLATION: ICD-10-CM

## 2024-11-07 DIAGNOSIS — I48.91 UNSPECIFIED ATRIAL FIBRILLATION: ICD-10-CM

## 2024-11-07 PROCEDURE — 93000 ELECTROCARDIOGRAM COMPLETE: CPT

## 2024-11-07 PROCEDURE — 99215 OFFICE O/P EST HI 40 MIN: CPT

## 2024-11-11 ENCOUNTER — NON-APPOINTMENT (OUTPATIENT)
Age: 64
End: 2024-11-11

## 2024-11-11 ENCOUNTER — APPOINTMENT (OUTPATIENT)
Dept: ELECTROPHYSIOLOGY | Facility: CLINIC | Age: 64
End: 2024-11-11
Payer: COMMERCIAL

## 2024-11-11 PROCEDURE — 93298 REM INTERROG DEV EVAL SCRMS: CPT

## 2024-11-16 ENCOUNTER — TRANSCRIPTION ENCOUNTER (OUTPATIENT)
Age: 64
End: 2024-11-16

## 2024-11-19 ENCOUNTER — RX RENEWAL (OUTPATIENT)
Age: 64
End: 2024-11-19

## 2024-12-13 ENCOUNTER — NON-APPOINTMENT (OUTPATIENT)
Age: 64
End: 2024-12-13

## 2024-12-13 ENCOUNTER — APPOINTMENT (OUTPATIENT)
Dept: ELECTROPHYSIOLOGY | Facility: CLINIC | Age: 64
End: 2024-12-13
Payer: COMMERCIAL

## 2024-12-13 PROCEDURE — 93298 REM INTERROG DEV EVAL SCRMS: CPT

## 2024-12-16 ENCOUNTER — TRANSCRIPTION ENCOUNTER (OUTPATIENT)
Age: 64
End: 2024-12-16

## 2024-12-17 ENCOUNTER — TRANSCRIPTION ENCOUNTER (OUTPATIENT)
Age: 64
End: 2024-12-17

## 2024-12-18 ENCOUNTER — APPOINTMENT (OUTPATIENT)
Dept: ORTHOPEDIC SURGERY | Facility: CLINIC | Age: 64
End: 2024-12-18
Payer: COMMERCIAL

## 2024-12-18 VITALS — BODY MASS INDEX: 44.41 KG/M2 | HEIGHT: 68 IN | WEIGHT: 293 LBS

## 2024-12-18 DIAGNOSIS — M17.12 UNILATERAL PRIMARY OSTEOARTHRITIS, LEFT KNEE: ICD-10-CM

## 2024-12-18 DIAGNOSIS — M17.11 UNILATERAL PRIMARY OSTEOARTHRITIS, RIGHT KNEE: ICD-10-CM

## 2024-12-18 PROCEDURE — 20610 DRAIN/INJ JOINT/BURSA W/O US: CPT | Mod: RT

## 2024-12-18 PROCEDURE — 99214 OFFICE O/P EST MOD 30 MIN: CPT | Mod: 25

## 2024-12-24 PROBLEM — F10.90 ALCOHOL USE: Status: ACTIVE | Noted: 2018-10-09

## 2025-01-06 ENCOUNTER — TRANSCRIPTION ENCOUNTER (OUTPATIENT)
Age: 65
End: 2025-01-06

## 2025-01-07 ENCOUNTER — OUTPATIENT (OUTPATIENT)
Dept: OUTPATIENT SERVICES | Facility: HOSPITAL | Age: 65
LOS: 1 days | End: 2025-01-07

## 2025-01-07 VITALS
WEIGHT: 293 LBS | TEMPERATURE: 98 F | SYSTOLIC BLOOD PRESSURE: 120 MMHG | HEART RATE: 56 BPM | DIASTOLIC BLOOD PRESSURE: 75 MMHG | OXYGEN SATURATION: 99 % | HEIGHT: 67 IN | RESPIRATION RATE: 18 BRPM

## 2025-01-07 DIAGNOSIS — I48.91 UNSPECIFIED ATRIAL FIBRILLATION: ICD-10-CM

## 2025-01-07 DIAGNOSIS — Z98.84 BARIATRIC SURGERY STATUS: Chronic | ICD-10-CM

## 2025-01-07 DIAGNOSIS — Z95.818 PRESENCE OF OTHER CARDIAC IMPLANTS AND GRAFTS: Chronic | ICD-10-CM

## 2025-01-07 DIAGNOSIS — Z98.890 OTHER SPECIFIED POSTPROCEDURAL STATES: Chronic | ICD-10-CM

## 2025-01-07 DIAGNOSIS — G47.33 OBSTRUCTIVE SLEEP APNEA (ADULT) (PEDIATRIC): ICD-10-CM

## 2025-01-07 LAB
ANION GAP SERPL CALC-SCNC: 12 MMOL/L — SIGNIFICANT CHANGE UP (ref 7–14)
BASOPHILS # BLD AUTO: 0.02 K/UL — SIGNIFICANT CHANGE UP (ref 0–0.2)
BASOPHILS NFR BLD AUTO: 0.3 % — SIGNIFICANT CHANGE UP (ref 0–2)
BLD GP AB SCN SERPL QL: NEGATIVE — SIGNIFICANT CHANGE UP
BUN SERPL-MCNC: 13 MG/DL — SIGNIFICANT CHANGE UP (ref 7–23)
CALCIUM SERPL-MCNC: 9.5 MG/DL — SIGNIFICANT CHANGE UP (ref 8.4–10.5)
CHLORIDE SERPL-SCNC: 106 MMOL/L — SIGNIFICANT CHANGE UP (ref 98–107)
CO2 SERPL-SCNC: 22 MMOL/L — SIGNIFICANT CHANGE UP (ref 22–31)
CREAT SERPL-MCNC: 0.58 MG/DL — SIGNIFICANT CHANGE UP (ref 0.5–1.3)
EGFR: 101 ML/MIN/1.73M2 — SIGNIFICANT CHANGE UP
EOSINOPHIL # BLD AUTO: 0.15 K/UL — SIGNIFICANT CHANGE UP (ref 0–0.5)
EOSINOPHIL NFR BLD AUTO: 2 % — SIGNIFICANT CHANGE UP (ref 0–6)
GLUCOSE SERPL-MCNC: 71 MG/DL — SIGNIFICANT CHANGE UP (ref 70–99)
HCT VFR BLD CALC: 43.3 % — SIGNIFICANT CHANGE UP (ref 34.5–45)
HGB BLD-MCNC: 13.8 G/DL — SIGNIFICANT CHANGE UP (ref 11.5–15.5)
IMM GRANULOCYTES NFR BLD AUTO: 0.4 % — SIGNIFICANT CHANGE UP (ref 0–0.9)
LYMPHOCYTES # BLD AUTO: 1.6 K/UL — SIGNIFICANT CHANGE UP (ref 1–3.3)
LYMPHOCYTES # BLD AUTO: 21 % — SIGNIFICANT CHANGE UP (ref 13–44)
MCHC RBC-ENTMCNC: 29 PG — SIGNIFICANT CHANGE UP (ref 27–34)
MCHC RBC-ENTMCNC: 31.9 G/DL — LOW (ref 32–36)
MCV RBC AUTO: 91 FL — SIGNIFICANT CHANGE UP (ref 80–100)
MONOCYTES # BLD AUTO: 0.51 K/UL — SIGNIFICANT CHANGE UP (ref 0–0.9)
MONOCYTES NFR BLD AUTO: 6.7 % — SIGNIFICANT CHANGE UP (ref 2–14)
NEUTROPHILS # BLD AUTO: 5.3 K/UL — SIGNIFICANT CHANGE UP (ref 1.8–7.4)
NEUTROPHILS NFR BLD AUTO: 69.6 % — SIGNIFICANT CHANGE UP (ref 43–77)
PLATELET # BLD AUTO: 242 K/UL — SIGNIFICANT CHANGE UP (ref 150–400)
POTASSIUM SERPL-MCNC: 4.4 MMOL/L — SIGNIFICANT CHANGE UP (ref 3.5–5.3)
POTASSIUM SERPL-SCNC: 4.4 MMOL/L — SIGNIFICANT CHANGE UP (ref 3.5–5.3)
RBC # BLD: 4.76 M/UL — SIGNIFICANT CHANGE UP (ref 3.8–5.2)
RBC # FLD: 13.8 % — SIGNIFICANT CHANGE UP (ref 10.3–14.5)
RH IG SCN BLD-IMP: NEGATIVE — SIGNIFICANT CHANGE UP
RH IG SCN BLD-IMP: NEGATIVE — SIGNIFICANT CHANGE UP
SODIUM SERPL-SCNC: 140 MMOL/L — SIGNIFICANT CHANGE UP (ref 135–145)
WBC # BLD: 7.61 K/UL — SIGNIFICANT CHANGE UP (ref 3.8–10.5)
WBC # FLD AUTO: 7.61 K/UL — SIGNIFICANT CHANGE UP (ref 3.8–10.5)

## 2025-01-07 NOTE — H&P PST ADULT - NSICDXPASTMEDICALHX_GEN_ALL_CORE_FT
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    Hypercholesterolemia     Lipodermatosclerosis of both lower extremities     Obesity, morbid, BMI 50 or higher     Obesity, unspecified classification, unspecified obesity type, unspecified whether serious comorbidity present     SIMA on CPAP     PAF (paroxysmal atrial fibrillation) on Eliquis    Venous insufficiency of lower extremity      PAST MEDICAL HISTORY:  Arthritis     History of nephritis Glomerular nephritis as a chlld 1968 stable since then    Hypercholesterolemia     Lipodermatosclerosis of both lower extremities     Obesity, morbid, BMI 50 or higher     SIMA on CPAP     PAF (paroxysmal atrial fibrillation) on Eliquis    Venous insufficiency of lower extremity

## 2025-01-07 NOTE — H&P PST ADULT - HISTORY OF PRESENT ILLNESS
63 y/o F with H/O: SIMA on CPAP, AFIB. on Eliquis, morbid obesity, GERD     paroxyamL afib. 2x on 10/2024; rapid surgeon recommended  63 y/o F with H/O: SIMA on CPAP, AFIB. on Eliquis - S/P ILR exchange 01/2023, morbid obesity, GERD presents for pre op evaluation for AFIB. ablation with PFA tentatively on 01/15/2025

## 2025-01-07 NOTE — H&P PST ADULT - NSICDXPASTSURGICALHX_GEN_ALL_CORE_FT
PAST SURGICAL HISTORY:  H/O varicose vein ligation and stripping     Status post gastric banding surgery 2008    Status post laparoscopic sleeve gastrectomy     Status post placement of implantable loop recorder 2/2019    Status post placement of implantable loop recorder

## 2025-01-07 NOTE — H&P PST ADULT - NEGATIVE ENMT SYMPTOMS
no ear pain/no sinus symptoms/no nasal congestion/no post-nasal discharge/no nose bleeds/no abnormal taste sensation/no throat pain/no dysphagia

## 2025-01-07 NOTE — H&P PST ADULT - PROBLEM SELECTOR PROBLEM 1
Addended by: LADI PRINCE on: 11/15/2019 04:44 PM     Modules accepted: Orders    
Unspecified atrial fibrillation

## 2025-01-07 NOTE — H&P PST ADULT - PROBLEM SELECTOR PLAN 1
Schedule for AFIB. ablation with PFA tentatively on 01/15/2025. Pre op instructions, chlorhexidine gluconate soap given and explained. Pt verbalized understanding.

## 2025-01-08 PROBLEM — L30.9 DERMATITIS, UNSPECIFIED: Chronic | Status: INACTIVE | Noted: 2019-02-05 | Resolved: 2025-01-07

## 2025-01-08 PROBLEM — E66.9 OBESITY, UNSPECIFIED: Chronic | Status: INACTIVE | Noted: 2019-02-05 | Resolved: 2025-01-07

## 2025-01-15 ENCOUNTER — TRANSCRIPTION ENCOUNTER (OUTPATIENT)
Age: 65
End: 2025-01-15

## 2025-01-15 ENCOUNTER — OUTPATIENT (OUTPATIENT)
Dept: OUTPATIENT SERVICES | Facility: HOSPITAL | Age: 65
LOS: 1 days | Discharge: HOME | End: 2025-01-15
Payer: COMMERCIAL

## 2025-01-15 VITALS
DIASTOLIC BLOOD PRESSURE: 85 MMHG | HEART RATE: 71 BPM | OXYGEN SATURATION: 97 % | RESPIRATION RATE: 18 BRPM | SYSTOLIC BLOOD PRESSURE: 123 MMHG

## 2025-01-15 VITALS
TEMPERATURE: 98 F | WEIGHT: 293 LBS | HEIGHT: 68 IN | DIASTOLIC BLOOD PRESSURE: 78 MMHG | HEART RATE: 60 BPM | SYSTOLIC BLOOD PRESSURE: 124 MMHG

## 2025-01-15 DIAGNOSIS — Z98.84 BARIATRIC SURGERY STATUS: Chronic | ICD-10-CM

## 2025-01-15 DIAGNOSIS — Z95.818 PRESENCE OF OTHER CARDIAC IMPLANTS AND GRAFTS: Chronic | ICD-10-CM

## 2025-01-15 DIAGNOSIS — Z98.890 OTHER SPECIFIED POSTPROCEDURAL STATES: Chronic | ICD-10-CM

## 2025-01-15 PROCEDURE — 93010 ELECTROCARDIOGRAM REPORT: CPT | Mod: 77

## 2025-01-15 PROCEDURE — 93657 TX L/R ATRIAL FIB ADDL: CPT

## 2025-01-15 PROCEDURE — 93655 ICAR CATH ABLTJ DSCRT ARRHYT: CPT

## 2025-01-15 PROCEDURE — 93656 COMPRE EP EVAL ABLTJ ATR FIB: CPT

## 2025-01-15 PROCEDURE — 93010 ELECTROCARDIOGRAM REPORT: CPT

## 2025-01-15 RX ORDER — KETOROLAC TROMETHAMINE 30 MG/ML
30 INJECTION INTRAMUSCULAR; INTRAVENOUS ONCE
Refills: 0 | Status: DISCONTINUED | OUTPATIENT
Start: 2025-01-15 | End: 2025-01-15

## 2025-01-15 RX ORDER — LIDOCAINE 50 MG/G
1 OINTMENT TOPICAL ONCE
Refills: 0 | Status: COMPLETED | OUTPATIENT
Start: 2025-01-15 | End: 2025-01-15

## 2025-01-15 RX ORDER — ORAL SEMAGLUTIDE 3 MG/1
1.7 TABLET ORAL
Refills: 0 | DISCHARGE

## 2025-01-15 RX ORDER — MILK THISTLE 150 MG
1 CAPSULE ORAL
Refills: 0 | DISCHARGE

## 2025-01-15 RX ORDER — METOPROLOL TARTRATE 50 MG
1 TABLET ORAL
Refills: 0 | DISCHARGE

## 2025-01-15 RX ORDER — SODIUM CHLORIDE 9 MG/ML
3 INJECTION, SOLUTION INTRAMUSCULAR; INTRAVENOUS; SUBCUTANEOUS EVERY 8 HOURS
Refills: 0 | Status: DISCONTINUED | OUTPATIENT
Start: 2025-01-15 | End: 2025-01-29

## 2025-01-15 RX ADMIN — KETOROLAC TROMETHAMINE 30 MILLIGRAM(S): 30 INJECTION INTRAMUSCULAR; INTRAVENOUS at 11:09

## 2025-01-15 RX ADMIN — LIDOCAINE 1 PATCH: 50 OINTMENT TOPICAL at 12:27

## 2025-01-15 NOTE — ASU DISCHARGE PLAN (ADULT/PEDIATRIC) - DRIVING
avoid driving for 2 days [place] Counseling: I discussed with the patient the risks of risankizumab-rzaa including but not limited to immunosuppression, and serious infections.  The patient understands that monitoring is required including a PPD at baseline and must alert us or the primary physician if symptoms of infection or other concerning signs are noted.

## 2025-01-15 NOTE — ASU DISCHARGE PLAN (ADULT/PEDIATRIC) - FINANCIAL ASSISTANCE
[facility] provides services at a reduced cost to those who are determined to be eligible through [facility]’s financial assistance program. Information regarding [facility]’s financial assistance program can be found by going to [email or web address] or by calling [phone].

## 2025-01-15 NOTE — ASU PREOP CHECKLIST - PATIENT SENT TO
[facility]  [address]  [place], [address]  [phone] fax    Physical Therapy Discharge Summary  Patient name: Garret Cardoza Start of Care: 17   Referral source: Porfirio Up MD : 1969   Medical/Treatment Diagnosis: Right knee pain [M25.561] Onset Date:chornic   Prior Hospitalization: see medical history Provider#: [id number]   Medications: Verified on Patient Summary List     Comorbidities: L ankle fx, B knee scope, diabetes, OA  Prior Level of Function:able to exercise in gym    Visits from Start of Care: 6    Missed Visits: 6  Reporting Period : 2018 to 2018      Summary of Care:  Updated Goals: to be achieved in 2 weeks:  1. Attain R hip abd and ER to 5/5 to maximize hip control to run - progressing 18; partially met 5/5 ER 4+/5 ABD 18  2. Pt will display jogging void of increase pain to return to PLOF - not yet met 18  3. PT will report having a little bit of difficulty with hopping to return to PLOF. - not yet met 18     ASSESSMENT/RECOMMENDATIONS: Unable to further assess progress towards goals at this time due to non-compliance/lack of attendance. DC at this time with no further instructions to the patient.   Thank you for this referral.    [x]Discontinue therapy: []Patient has reached or is progressing toward set goals      [x]Patient is non-compliant or has abdicated      []Due to lack of appreciable progress towards set [address], PT 3/13/2018 8:56 AM EP 1/operating room

## 2025-01-16 ENCOUNTER — NON-APPOINTMENT (OUTPATIENT)
Age: 65
End: 2025-01-16

## 2025-01-16 ENCOUNTER — TRANSCRIPTION ENCOUNTER (OUTPATIENT)
Age: 65
End: 2025-01-16

## 2025-01-16 ENCOUNTER — APPOINTMENT (OUTPATIENT)
Dept: ELECTROPHYSIOLOGY | Facility: CLINIC | Age: 65
End: 2025-01-16
Payer: COMMERCIAL

## 2025-01-16 PROCEDURE — 93298 REM INTERROG DEV EVAL SCRMS: CPT

## 2025-02-11 ENCOUNTER — NON-APPOINTMENT (OUTPATIENT)
Age: 65
End: 2025-02-11

## 2025-02-20 ENCOUNTER — NON-APPOINTMENT (OUTPATIENT)
Age: 65
End: 2025-02-20

## 2025-02-20 ENCOUNTER — APPOINTMENT (OUTPATIENT)
Dept: ELECTROPHYSIOLOGY | Facility: CLINIC | Age: 65
End: 2025-02-20
Payer: COMMERCIAL

## 2025-02-20 PROCEDURE — 93298 REM INTERROG DEV EVAL SCRMS: CPT

## 2025-02-24 ENCOUNTER — APPOINTMENT (OUTPATIENT)
Dept: ELECTROPHYSIOLOGY | Facility: CLINIC | Age: 65
End: 2025-02-24
Payer: COMMERCIAL

## 2025-02-24 ENCOUNTER — NON-APPOINTMENT (OUTPATIENT)
Age: 65
End: 2025-02-24

## 2025-02-24 VITALS
SYSTOLIC BLOOD PRESSURE: 106 MMHG | WEIGHT: 293 LBS | DIASTOLIC BLOOD PRESSURE: 71 MMHG | HEIGHT: 68 IN | BODY MASS INDEX: 44.41 KG/M2 | OXYGEN SATURATION: 98 % | HEART RATE: 64 BPM

## 2025-02-24 VITALS — HEIGHT: 68 IN | WEIGHT: 293 LBS | BODY MASS INDEX: 44.41 KG/M2

## 2025-02-24 DIAGNOSIS — E78.5 HYPERLIPIDEMIA, UNSPECIFIED: ICD-10-CM

## 2025-02-24 DIAGNOSIS — G47.33 OBSTRUCTIVE SLEEP APNEA (ADULT) (PEDIATRIC): ICD-10-CM

## 2025-02-24 DIAGNOSIS — I48.91 UNSPECIFIED ATRIAL FIBRILLATION: ICD-10-CM

## 2025-02-24 PROCEDURE — 99214 OFFICE O/P EST MOD 30 MIN: CPT

## 2025-02-24 PROCEDURE — G2211 COMPLEX E/M VISIT ADD ON: CPT

## 2025-02-24 PROCEDURE — 93000 ELECTROCARDIOGRAM COMPLETE: CPT

## 2025-03-11 ENCOUNTER — APPOINTMENT (OUTPATIENT)
Dept: CARDIOLOGY | Facility: CLINIC | Age: 65
End: 2025-03-11
Payer: COMMERCIAL

## 2025-03-11 ENCOUNTER — LABORATORY RESULT (OUTPATIENT)
Age: 65
End: 2025-03-11

## 2025-03-11 ENCOUNTER — RESULT REVIEW (OUTPATIENT)
Age: 65
End: 2025-03-11

## 2025-03-11 VITALS
TEMPERATURE: 97.7 F | BODY MASS INDEX: 44.41 KG/M2 | WEIGHT: 293 LBS | SYSTOLIC BLOOD PRESSURE: 110 MMHG | OXYGEN SATURATION: 98 % | HEIGHT: 68 IN | DIASTOLIC BLOOD PRESSURE: 60 MMHG | HEART RATE: 68 BPM

## 2025-03-11 DIAGNOSIS — I83.899 VARICOSE VEINS OF UNSPECIFIED LOWER EXTREMITY WITH OTHER COMPLICATIONS: ICD-10-CM

## 2025-03-11 DIAGNOSIS — R00.2 PALPITATIONS: ICD-10-CM

## 2025-03-11 DIAGNOSIS — Z13.820 ENCOUNTER FOR SCREENING FOR OSTEOPOROSIS: ICD-10-CM

## 2025-03-11 DIAGNOSIS — Z12.39 ENCOUNTER FOR OTHER SCREENING FOR MALIGNANT NEOPLASM OF BREAST: ICD-10-CM

## 2025-03-11 DIAGNOSIS — Z12.83 ENCOUNTER FOR SCREENING FOR MALIGNANT NEOPLASM OF SKIN: ICD-10-CM

## 2025-03-11 DIAGNOSIS — R10.9 UNSPECIFIED ABDOMINAL PAIN: ICD-10-CM

## 2025-03-11 DIAGNOSIS — K21.9 GASTRO-ESOPHAGEAL REFLUX DISEASE W/OUT ESOPHAGITIS: ICD-10-CM

## 2025-03-11 DIAGNOSIS — R00.1 BRADYCARDIA, UNSPECIFIED: ICD-10-CM

## 2025-03-11 DIAGNOSIS — I48.0 PAROXYSMAL ATRIAL FIBRILLATION: ICD-10-CM

## 2025-03-11 DIAGNOSIS — Z00.00 ENCOUNTER FOR GENERAL ADULT MEDICAL EXAMINATION W/OUT ABNORMAL FINDINGS: ICD-10-CM

## 2025-03-11 DIAGNOSIS — R41.3 OTHER AMNESIA: ICD-10-CM

## 2025-03-11 DIAGNOSIS — Z12.11 ENCOUNTER FOR SCREENING FOR MALIGNANT NEOPLASM OF COLON: ICD-10-CM

## 2025-03-11 DIAGNOSIS — Z71.85 ENCOUNTER FOR IMMUNIZATION SAFETY COUNSELING: ICD-10-CM

## 2025-03-11 DIAGNOSIS — Z13.228 ENCOUNTER FOR SCREENING FOR OTHER METABOLIC DISORDERS: ICD-10-CM

## 2025-03-11 PROCEDURE — 93000 ELECTROCARDIOGRAM COMPLETE: CPT

## 2025-03-11 PROCEDURE — 99396 PREV VISIT EST AGE 40-64: CPT

## 2025-03-12 ENCOUNTER — NON-APPOINTMENT (OUTPATIENT)
Age: 65
End: 2025-03-12

## 2025-03-12 LAB
25(OH)D3 SERPL-MCNC: 35.8 NG/ML
ALBUMIN SERPL ELPH-MCNC: 4.5 G/DL
ALP BLD-CCNC: 119 U/L
ALT SERPL-CCNC: 20 U/L
ANION GAP SERPL CALC-SCNC: 16 MMOL/L
APPEARANCE: CLEAR
AST SERPL-CCNC: 33 U/L
BACTERIA: NEGATIVE /HPF
BASOPHILS # BLD AUTO: 0.03 K/UL
BASOPHILS NFR BLD AUTO: 0.3 %
BILIRUB SERPL-MCNC: 0.4 MG/DL
BILIRUBIN URINE: NEGATIVE
BLOOD URINE: NEGATIVE
BUN SERPL-MCNC: 14 MG/DL
CALCIUM SERPL-MCNC: 9.6 MG/DL
CAST: 0 /LPF
CHLORIDE SERPL-SCNC: 103 MMOL/L
CHOLEST SERPL-MCNC: 254 MG/DL
CO2 SERPL-SCNC: 22 MMOL/L
COLOR: YELLOW
CREAT SERPL-MCNC: 0.67 MG/DL
EGFRCR SERPLBLD CKD-EPI 2021: 98 ML/MIN/1.73M2
EOSINOPHIL # BLD AUTO: 0.15 K/UL
EOSINOPHIL NFR BLD AUTO: 1.7 %
EPITHELIAL CELLS: 1 /HPF
ESTIMATED AVERAGE GLUCOSE: 103 MG/DL
GLUCOSE QUALITATIVE U: NEGATIVE MG/DL
GLUCOSE SERPL-MCNC: 86 MG/DL
HBA1C MFR BLD HPLC: 5.2 %
HCT VFR BLD CALC: 44.3 %
HDLC SERPL-MCNC: 65 MG/DL
HGB BLD-MCNC: 13.9 G/DL
IMM GRANULOCYTES NFR BLD AUTO: 0.2 %
KETONES URINE: NEGATIVE MG/DL
LDLC SERPL CALC-MCNC: 163 MG/DL
LEUKOCYTE ESTERASE URINE: NEGATIVE
LYMPHOCYTES # BLD AUTO: 2.1 K/UL
LYMPHOCYTES NFR BLD AUTO: 23.5 %
MAN DIFF?: NORMAL
MCHC RBC-ENTMCNC: 28.8 PG
MCHC RBC-ENTMCNC: 31.4 G/DL
MCV RBC AUTO: 91.9 FL
MICROSCOPIC-UA: NORMAL
MONOCYTES # BLD AUTO: 0.61 K/UL
MONOCYTES NFR BLD AUTO: 6.8 %
NEUTROPHILS # BLD AUTO: 6.02 K/UL
NEUTROPHILS NFR BLD AUTO: 67.5 %
NITRITE URINE: NEGATIVE
NONHDLC SERPL-MCNC: 190 MG/DL
PH URINE: 5.5
PLATELET # BLD AUTO: 284 K/UL
POTASSIUM SERPL-SCNC: 4.6 MMOL/L
PROT SERPL-MCNC: 7.4 G/DL
PROTEIN URINE: NEGATIVE MG/DL
RBC # BLD: 4.82 M/UL
RBC # FLD: 14.5 %
RED BLOOD CELLS URINE: 1 /HPF
SODIUM SERPL-SCNC: 141 MMOL/L
SPECIFIC GRAVITY URINE: 1.03
T4 FREE SERPL-MCNC: 1 NG/DL
TRIGL SERPL-MCNC: 148 MG/DL
TSH SERPL-ACNC: 0.92 UIU/ML
UROBILINOGEN URINE: 0.2 MG/DL
WBC # FLD AUTO: 8.93 K/UL
WHITE BLOOD CELLS URINE: 1 /HPF

## 2025-03-13 ENCOUNTER — OUTPATIENT (OUTPATIENT)
Dept: OUTPATIENT SERVICES | Facility: HOSPITAL | Age: 65
LOS: 1 days | End: 2025-03-13
Payer: COMMERCIAL

## 2025-03-13 ENCOUNTER — APPOINTMENT (OUTPATIENT)
Dept: CT IMAGING | Facility: IMAGING CENTER | Age: 65
End: 2025-03-13
Payer: COMMERCIAL

## 2025-03-13 DIAGNOSIS — R10.9 UNSPECIFIED ABDOMINAL PAIN: ICD-10-CM

## 2025-03-13 DIAGNOSIS — Z95.818 PRESENCE OF OTHER CARDIAC IMPLANTS AND GRAFTS: Chronic | ICD-10-CM

## 2025-03-13 DIAGNOSIS — Z98.84 BARIATRIC SURGERY STATUS: Chronic | ICD-10-CM

## 2025-03-13 DIAGNOSIS — Z98.890 OTHER SPECIFIED POSTPROCEDURAL STATES: Chronic | ICD-10-CM

## 2025-03-13 PROCEDURE — 74177 CT ABD & PELVIS W/CONTRAST: CPT | Mod: 26

## 2025-03-13 PROCEDURE — 74177 CT ABD & PELVIS W/CONTRAST: CPT

## 2025-03-13 RX ORDER — ROSUVASTATIN CALCIUM 5 MG/1
5 TABLET, FILM COATED ORAL DAILY
Qty: 90 | Refills: 2 | Status: ACTIVE | COMMUNITY
Start: 2025-03-13 | End: 1900-01-01

## 2025-03-15 ENCOUNTER — OUTPATIENT (OUTPATIENT)
Dept: OUTPATIENT SERVICES | Facility: HOSPITAL | Age: 65
LOS: 1 days | End: 2025-03-15
Payer: COMMERCIAL

## 2025-03-15 ENCOUNTER — APPOINTMENT (OUTPATIENT)
Dept: CT IMAGING | Facility: IMAGING CENTER | Age: 65
End: 2025-03-15

## 2025-03-15 DIAGNOSIS — Z98.84 BARIATRIC SURGERY STATUS: Chronic | ICD-10-CM

## 2025-03-15 DIAGNOSIS — Z95.818 PRESENCE OF OTHER CARDIAC IMPLANTS AND GRAFTS: Chronic | ICD-10-CM

## 2025-03-15 DIAGNOSIS — E78.5 HYPERLIPIDEMIA, UNSPECIFIED: ICD-10-CM

## 2025-03-15 DIAGNOSIS — Z00.8 ENCOUNTER FOR OTHER GENERAL EXAMINATION: ICD-10-CM

## 2025-03-15 DIAGNOSIS — Z98.890 OTHER SPECIFIED POSTPROCEDURAL STATES: Chronic | ICD-10-CM

## 2025-03-15 PROCEDURE — 75571 CT HRT W/O DYE W/CA TEST: CPT | Mod: 26

## 2025-03-15 PROCEDURE — 75571 CT HRT W/O DYE W/CA TEST: CPT

## 2025-03-18 ENCOUNTER — NON-APPOINTMENT (OUTPATIENT)
Age: 65
End: 2025-03-18

## 2025-03-20 ENCOUNTER — APPOINTMENT (OUTPATIENT)
Dept: ENDOCRINOLOGY | Facility: CLINIC | Age: 65
End: 2025-03-20

## 2025-03-20 VITALS
WEIGHT: 293 LBS | SYSTOLIC BLOOD PRESSURE: 115 MMHG | HEART RATE: 75 BPM | DIASTOLIC BLOOD PRESSURE: 75 MMHG | OXYGEN SATURATION: 98 % | BODY MASS INDEX: 44.41 KG/M2 | HEIGHT: 68 IN

## 2025-03-20 DIAGNOSIS — I48.91 UNSPECIFIED ATRIAL FIBRILLATION: ICD-10-CM

## 2025-03-20 DIAGNOSIS — E66.3 OVERWEIGHT: ICD-10-CM

## 2025-03-20 DIAGNOSIS — E55.9 VITAMIN D DEFICIENCY, UNSPECIFIED: ICD-10-CM

## 2025-03-20 DIAGNOSIS — E66.9 OVERWEIGHT: ICD-10-CM

## 2025-03-20 DIAGNOSIS — E78.5 HYPERLIPIDEMIA, UNSPECIFIED: ICD-10-CM

## 2025-03-20 PROCEDURE — G2211 COMPLEX E/M VISIT ADD ON: CPT

## 2025-03-20 PROCEDURE — 99214 OFFICE O/P EST MOD 30 MIN: CPT

## 2025-03-21 RX ORDER — TIRZEPATIDE 12.5 MG/.5ML
12.5 INJECTION, SOLUTION SUBCUTANEOUS
Qty: 3 | Refills: 2 | Status: ACTIVE | COMMUNITY
Start: 2025-03-21 | End: 1900-01-01

## 2025-03-24 ENCOUNTER — APPOINTMENT (OUTPATIENT)
Dept: SURGERY | Facility: CLINIC | Age: 65
End: 2025-03-24
Payer: COMMERCIAL

## 2025-03-24 VITALS
HEART RATE: 60 BPM | HEIGHT: 68 IN | SYSTOLIC BLOOD PRESSURE: 131 MMHG | TEMPERATURE: 96.5 F | OXYGEN SATURATION: 99 % | RESPIRATION RATE: 16 BRPM | WEIGHT: 293 LBS | DIASTOLIC BLOOD PRESSURE: 78 MMHG | BODY MASS INDEX: 44.41 KG/M2

## 2025-03-24 DIAGNOSIS — E66.01 MORBID (SEVERE) OBESITY DUE TO EXCESS CALORIES: ICD-10-CM

## 2025-03-24 DIAGNOSIS — K21.9 GASTRO-ESOPHAGEAL REFLUX DISEASE W/OUT ESOPHAGITIS: ICD-10-CM

## 2025-03-24 DIAGNOSIS — Z98.84 BARIATRIC SURGERY STATUS: ICD-10-CM

## 2025-03-24 DIAGNOSIS — R63.5 ABNORMAL WEIGHT GAIN: ICD-10-CM

## 2025-03-24 PROCEDURE — 99205 OFFICE O/P NEW HI 60 MIN: CPT

## 2025-03-25 ENCOUNTER — APPOINTMENT (OUTPATIENT)
Dept: CARDIOLOGY | Facility: CLINIC | Age: 65
End: 2025-03-25
Payer: COMMERCIAL

## 2025-03-25 ENCOUNTER — NON-APPOINTMENT (OUTPATIENT)
Age: 65
End: 2025-03-25

## 2025-03-25 PROCEDURE — 93306 TTE W/DOPPLER COMPLETE: CPT

## 2025-03-27 ENCOUNTER — APPOINTMENT (OUTPATIENT)
Dept: ELECTROPHYSIOLOGY | Facility: CLINIC | Age: 65
End: 2025-03-27
Payer: COMMERCIAL

## 2025-03-27 ENCOUNTER — NON-APPOINTMENT (OUTPATIENT)
Age: 65
End: 2025-03-27

## 2025-03-27 PROCEDURE — 93298 REM INTERROG DEV EVAL SCRMS: CPT

## 2025-04-03 ENCOUNTER — APPOINTMENT (OUTPATIENT)
Dept: SURGERY | Facility: CLINIC | Age: 65
End: 2025-04-03
Payer: COMMERCIAL

## 2025-04-03 PROCEDURE — 97802 MEDICAL NUTRITION INDIV IN: CPT | Mod: 95

## 2025-04-07 DIAGNOSIS — Z09 ENCOUNTER FOR FOLLOW-UP EXAMINATION AFTER COMPLETED TREATMENT FOR CONDITIONS OTHER THAN MALIGNANT NEOPLASM: ICD-10-CM

## 2025-04-07 DIAGNOSIS — U07.1 COVID-19: ICD-10-CM

## 2025-04-07 NOTE — PLAN
extraocular eye movements intact, sclera anicteric  Nose - normal and patent, no erythema, discharge or polyps  Mouth - mucous membranes moist  Abdomen - soft, nontender, nondistended, no masses or organomegaly  Lymphatic-  No palpable lymphadenopathy  Neurological -  normal speech, no focal findings or movement disorder noted  Musculoskeletal - no deformity or swelling  Extremities - no pedal edema, no clubbing or cyanosis  Skin - normal coloration and turgor      Urinalysis  UA - Dipstick  Results for orders placed or performed in visit on 04/07/25   AMB POC URINALYSIS DIP STICK AUTO W/O MICRO   Result Value Ref Range    Color (UA POC)      Clarity (UA POC)      Glucose, Urine,  Negative mg/dL    Bilirubin, Urine, POC Negative Negative    KETONES, Urine, POC Negative Negative mg/dL    Specific Gravity, Urine, POC 1.020 1.001 - 1.035    Blood (UA POC) Negative     pH, Urine, POC 6.0 4.6 - 8.0    Protein, Urine, POC Negative Negative mg/dL    Urobilinogen, POC 0.2 mg/dL <1.1 mg/dL    Nitrite, Urine, POC Negative Negative    Leukocyte Esterase, Urine, POC Negative Negative         UA - Micro  WBC - 0  RBC - 0  Bacteria - 0  Epith - 0    Assessment/Plan    ICD-10-CM    1. Bladder wall thickening  N32.89 AMB POC URINALYSIS DIP STICK AUTO W/O MICRO      2. Benign prostatic hyperplasia with lower urinary tract symptoms, symptom details unspecified  N40.1 PSA, Diagnostic        PSA drawn today.  RTO in 2 wks for cysto.    JANAE RHODES DO    Total time for today's encounter including chart review, result review, documentation and face to face encounter was 46 minutes.  
[FreeTextEntry1] : Progressively worsening cellulitis despite po antibiotics with leg swelling, pain, and reported fevers.  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 ID consult recommended.\par No open wounds.

## 2025-04-08 ENCOUNTER — APPOINTMENT (OUTPATIENT)
Facility: CLINIC | Age: 65
End: 2025-04-08
Payer: COMMERCIAL

## 2025-04-08 ENCOUNTER — APPOINTMENT (OUTPATIENT)
Facility: CLINIC | Age: 65
End: 2025-04-08

## 2025-04-08 VITALS
HEART RATE: 56 BPM | DIASTOLIC BLOOD PRESSURE: 67 MMHG | RESPIRATION RATE: 16 BRPM | TEMPERATURE: 97.7 F | BODY MASS INDEX: 44.41 KG/M2 | HEIGHT: 68 IN | WEIGHT: 293 LBS | SYSTOLIC BLOOD PRESSURE: 131 MMHG | OXYGEN SATURATION: 98 %

## 2025-04-08 DIAGNOSIS — R06.09 OTHER FORMS OF DYSPNEA: ICD-10-CM

## 2025-04-08 DIAGNOSIS — Z01.811 ENCOUNTER FOR PREPROCEDURAL RESPIRATORY EXAMINATION: ICD-10-CM

## 2025-04-08 DIAGNOSIS — Z86.79 PERSONAL HISTORY OF OTHER DISEASES OF THE CIRCULATORY SYSTEM: ICD-10-CM

## 2025-04-08 DIAGNOSIS — E66.01 MORBID (SEVERE) OBESITY DUE TO EXCESS CALORIES: ICD-10-CM

## 2025-04-08 DIAGNOSIS — Z80.1 FAMILY HISTORY OF MALIGNANT NEOPLASM OF TRACHEA, BRONCHUS AND LUNG: ICD-10-CM

## 2025-04-08 DIAGNOSIS — Z87.891 PERSONAL HISTORY OF NICOTINE DEPENDENCE: ICD-10-CM

## 2025-04-08 DIAGNOSIS — Z77.22 CONTACT WITH AND (SUSPECTED) EXPOSURE TO ENVIRONMENTAL TOBACCO SMOKE (ACUTE) (CHRONIC): ICD-10-CM

## 2025-04-08 DIAGNOSIS — Z87.39 PERSONAL HISTORY OF OTHER DISEASES OF THE MUSCULOSKELETAL SYSTEM AND CONNECTIVE TISSUE: ICD-10-CM

## 2025-04-08 DIAGNOSIS — Z82.49 FAMILY HISTORY OF ISCHEMIC HEART DISEASE AND OTHER DISEASES OF THE CIRCULATORY SYSTEM: ICD-10-CM

## 2025-04-08 DIAGNOSIS — Z99.89 DEPENDENCE ON OTHER ENABLING MACHINES AND DEVICES: ICD-10-CM

## 2025-04-08 DIAGNOSIS — Z85.9 PERSONAL HISTORY OF MALIGNANT NEOPLASM, UNSPECIFIED: ICD-10-CM

## 2025-04-08 DIAGNOSIS — Z82.5 FAMILY HISTORY OF ASTHMA AND OTHER CHRONIC LOWER RESPIRATORY DISEASES: ICD-10-CM

## 2025-04-08 DIAGNOSIS — Z20.822 CONTACT WITH AND (SUSPECTED) EXPOSURE TO COVID-19: ICD-10-CM

## 2025-04-08 DIAGNOSIS — G47.33 OBSTRUCTIVE SLEEP APNEA (ADULT) (PEDIATRIC): ICD-10-CM

## 2025-04-08 DIAGNOSIS — I48.0 PAROXYSMAL ATRIAL FIBRILLATION: ICD-10-CM

## 2025-04-08 PROCEDURE — ZZZZZ: CPT

## 2025-04-08 PROCEDURE — 94727 GAS DIL/WSHOT DETER LNG VOL: CPT

## 2025-04-08 PROCEDURE — 94060 EVALUATION OF WHEEZING: CPT

## 2025-04-08 PROCEDURE — 94729 DIFFUSING CAPACITY: CPT

## 2025-04-08 PROCEDURE — 99204 OFFICE O/P NEW MOD 45 MIN: CPT | Mod: 25

## 2025-04-20 PROBLEM — Z01.811 ENCOUNTER FOR PRE-OPERATIVE RESPIRATORY CLEARANCE: Status: ACTIVE | Noted: 2025-04-20

## 2025-04-20 PROBLEM — R06.09 DYSPNEA ON EXERTION: Status: RESOLVED | Noted: 2022-01-04 | Resolved: 2025-04-20

## 2025-04-20 PROBLEM — Z20.822 ENCOUNTER FOR LABORATORY TESTING FOR COVID-19 VIRUS: Status: RESOLVED | Noted: 2020-12-07 | Resolved: 2025-04-20

## 2025-04-21 ENCOUNTER — APPOINTMENT (OUTPATIENT)
Dept: SURGERY | Facility: CLINIC | Age: 65
End: 2025-04-21
Payer: COMMERCIAL

## 2025-04-21 PROCEDURE — 97803 MED NUTRITION INDIV SUBSEQ: CPT | Mod: 95

## 2025-04-29 ENCOUNTER — OUTPATIENT (OUTPATIENT)
Dept: OUTPATIENT SERVICES | Facility: HOSPITAL | Age: 65
LOS: 1 days | End: 2025-04-29
Payer: COMMERCIAL

## 2025-04-29 ENCOUNTER — APPOINTMENT (OUTPATIENT)
Dept: MRI IMAGING | Facility: IMAGING CENTER | Age: 65
End: 2025-04-29
Payer: COMMERCIAL

## 2025-04-29 ENCOUNTER — RESULT REVIEW (OUTPATIENT)
Age: 65
End: 2025-04-29

## 2025-04-29 DIAGNOSIS — Z98.84 BARIATRIC SURGERY STATUS: Chronic | ICD-10-CM

## 2025-04-29 DIAGNOSIS — Z95.818 PRESENCE OF OTHER CARDIAC IMPLANTS AND GRAFTS: Chronic | ICD-10-CM

## 2025-04-29 DIAGNOSIS — Z98.890 OTHER SPECIFIED POSTPROCEDURAL STATES: Chronic | ICD-10-CM

## 2025-04-29 DIAGNOSIS — Z00.8 ENCOUNTER FOR OTHER GENERAL EXAMINATION: ICD-10-CM

## 2025-04-29 PROCEDURE — 70551 MRI BRAIN STEM W/O DYE: CPT

## 2025-04-29 PROCEDURE — 70551 MRI BRAIN STEM W/O DYE: CPT | Mod: 26

## 2025-05-01 ENCOUNTER — NON-APPOINTMENT (OUTPATIENT)
Age: 65
End: 2025-05-01

## 2025-05-01 ENCOUNTER — APPOINTMENT (OUTPATIENT)
Dept: ORTHOPEDIC SURGERY | Facility: CLINIC | Age: 65
End: 2025-05-01
Payer: COMMERCIAL

## 2025-05-01 ENCOUNTER — APPOINTMENT (OUTPATIENT)
Dept: ELECTROPHYSIOLOGY | Facility: CLINIC | Age: 65
End: 2025-05-01
Payer: COMMERCIAL

## 2025-05-01 DIAGNOSIS — M17.11 UNILATERAL PRIMARY OSTEOARTHRITIS, RIGHT KNEE: ICD-10-CM

## 2025-05-01 DIAGNOSIS — M17.12 UNILATERAL PRIMARY OSTEOARTHRITIS, LEFT KNEE: ICD-10-CM

## 2025-05-01 PROCEDURE — 99213 OFFICE O/P EST LOW 20 MIN: CPT | Mod: 25

## 2025-05-01 PROCEDURE — 93298 REM INTERROG DEV EVAL SCRMS: CPT

## 2025-05-01 PROCEDURE — 20610 DRAIN/INJ JOINT/BURSA W/O US: CPT | Mod: 50

## 2025-06-02 ENCOUNTER — NON-APPOINTMENT (OUTPATIENT)
Age: 65
End: 2025-06-02

## 2025-06-02 ENCOUNTER — APPOINTMENT (OUTPATIENT)
Dept: ELECTROPHYSIOLOGY | Facility: CLINIC | Age: 65
End: 2025-06-02
Payer: COMMERCIAL

## 2025-06-02 VITALS
WEIGHT: 293 LBS | HEART RATE: 69 BPM | DIASTOLIC BLOOD PRESSURE: 69 MMHG | BODY MASS INDEX: 44.41 KG/M2 | OXYGEN SATURATION: 95 % | SYSTOLIC BLOOD PRESSURE: 110 MMHG | TEMPERATURE: 98.1 F | HEIGHT: 68 IN

## 2025-06-02 DIAGNOSIS — E78.5 HYPERLIPIDEMIA, UNSPECIFIED: ICD-10-CM

## 2025-06-02 DIAGNOSIS — I48.0 PAROXYSMAL ATRIAL FIBRILLATION: ICD-10-CM

## 2025-06-02 DIAGNOSIS — Z99.89 DEPENDENCE ON OTHER ENABLING MACHINES AND DEVICES: ICD-10-CM

## 2025-06-02 PROCEDURE — 99214 OFFICE O/P EST MOD 30 MIN: CPT

## 2025-06-02 PROCEDURE — G2211 COMPLEX E/M VISIT ADD ON: CPT

## 2025-06-02 PROCEDURE — 93000 ELECTROCARDIOGRAM COMPLETE: CPT

## 2025-06-05 ENCOUNTER — APPOINTMENT (OUTPATIENT)
Dept: ELECTROPHYSIOLOGY | Facility: CLINIC | Age: 65
End: 2025-06-05
Payer: COMMERCIAL

## 2025-06-05 ENCOUNTER — NON-APPOINTMENT (OUTPATIENT)
Age: 65
End: 2025-06-05

## 2025-06-05 PROCEDURE — 93298 REM INTERROG DEV EVAL SCRMS: CPT

## 2025-06-20 ENCOUNTER — APPOINTMENT (OUTPATIENT)
Dept: ULTRASOUND IMAGING | Facility: HOSPITAL | Age: 65
End: 2025-06-20

## 2025-06-20 ENCOUNTER — OUTPATIENT (OUTPATIENT)
Dept: OUTPATIENT SERVICES | Facility: HOSPITAL | Age: 65
LOS: 1 days | End: 2025-06-20
Payer: COMMERCIAL

## 2025-06-20 ENCOUNTER — APPOINTMENT (OUTPATIENT)
Dept: RADIOLOGY | Facility: HOSPITAL | Age: 65
End: 2025-06-20

## 2025-06-20 DIAGNOSIS — Z98.84 BARIATRIC SURGERY STATUS: Chronic | ICD-10-CM

## 2025-06-20 DIAGNOSIS — Z98.890 OTHER SPECIFIED POSTPROCEDURAL STATES: Chronic | ICD-10-CM

## 2025-06-20 DIAGNOSIS — E66.01 MORBID (SEVERE) OBESITY DUE TO EXCESS CALORIES: ICD-10-CM

## 2025-06-20 DIAGNOSIS — E78.5 HYPERLIPIDEMIA, UNSPECIFIED: ICD-10-CM

## 2025-06-20 DIAGNOSIS — Z95.818 PRESENCE OF OTHER CARDIAC IMPLANTS AND GRAFTS: Chronic | ICD-10-CM

## 2025-06-20 PROCEDURE — 74240 X-RAY XM UPR GI TRC 1CNTRST: CPT | Mod: 26

## 2025-06-20 PROCEDURE — 76700 US EXAM ABDOM COMPLETE: CPT | Mod: 26

## 2025-07-10 ENCOUNTER — NON-APPOINTMENT (OUTPATIENT)
Age: 65
End: 2025-07-10

## 2025-07-10 ENCOUNTER — APPOINTMENT (OUTPATIENT)
Dept: ELECTROPHYSIOLOGY | Facility: CLINIC | Age: 65
End: 2025-07-10
Payer: COMMERCIAL

## 2025-07-10 PROCEDURE — 93298 REM INTERROG DEV EVAL SCRMS: CPT

## 2025-07-11 ENCOUNTER — APPOINTMENT (OUTPATIENT)
Dept: RADIOLOGY | Facility: HOSPITAL | Age: 65
End: 2025-07-11

## 2025-07-18 ENCOUNTER — APPOINTMENT (OUTPATIENT)
Dept: ORTHOPEDIC SURGERY | Facility: CLINIC | Age: 65
End: 2025-07-18
Payer: COMMERCIAL

## 2025-07-18 PROCEDURE — 20610 DRAIN/INJ JOINT/BURSA W/O US: CPT | Mod: LT

## 2025-07-18 PROCEDURE — 99214 OFFICE O/P EST MOD 30 MIN: CPT | Mod: 25

## 2025-07-18 RX ORDER — HYALURONATE SOD, CROSS-LINKED 30 MG/3 ML
30 SYRINGE (ML) INTRAARTICULAR
Qty: 2 | Refills: 1 | Status: ACTIVE | COMMUNITY
Start: 2025-07-18

## 2025-07-22 ENCOUNTER — RESULT REVIEW (OUTPATIENT)
Age: 65
End: 2025-07-22

## 2025-07-22 ENCOUNTER — OUTPATIENT (OUTPATIENT)
Dept: OUTPATIENT SERVICES | Facility: HOSPITAL | Age: 65
LOS: 1 days | End: 2025-07-22
Payer: COMMERCIAL

## 2025-07-22 ENCOUNTER — APPOINTMENT (OUTPATIENT)
Dept: MRI IMAGING | Facility: IMAGING CENTER | Age: 65
End: 2025-07-22
Payer: COMMERCIAL

## 2025-07-22 DIAGNOSIS — Z98.890 OTHER SPECIFIED POSTPROCEDURAL STATES: Chronic | ICD-10-CM

## 2025-07-22 DIAGNOSIS — Z98.84 BARIATRIC SURGERY STATUS: Chronic | ICD-10-CM

## 2025-07-22 DIAGNOSIS — Z00.8 ENCOUNTER FOR OTHER GENERAL EXAMINATION: ICD-10-CM

## 2025-07-22 DIAGNOSIS — Z95.818 PRESENCE OF OTHER CARDIAC IMPLANTS AND GRAFTS: Chronic | ICD-10-CM

## 2025-07-22 PROCEDURE — A9585: CPT

## 2025-07-22 PROCEDURE — 70553 MRI BRAIN STEM W/O & W/DYE: CPT | Mod: 26

## 2025-07-22 PROCEDURE — 70553 MRI BRAIN STEM W/O & W/DYE: CPT

## 2025-07-30 DIAGNOSIS — K44.9 DIAPHRAGMATIC HERNIA W/OUT OBSTRUCTION OR GANGRENE: ICD-10-CM

## 2025-08-14 ENCOUNTER — APPOINTMENT (OUTPATIENT)
Dept: ELECTROPHYSIOLOGY | Facility: CLINIC | Age: 65
End: 2025-08-14
Payer: COMMERCIAL

## 2025-08-14 ENCOUNTER — NON-APPOINTMENT (OUTPATIENT)
Age: 65
End: 2025-08-14

## 2025-08-14 PROCEDURE — 93298 REM INTERROG DEV EVAL SCRMS: CPT

## 2025-09-08 ENCOUNTER — RX RENEWAL (OUTPATIENT)
Age: 65
End: 2025-09-08

## 2025-09-15 ENCOUNTER — APPOINTMENT (OUTPATIENT)
Dept: SURGERY | Facility: CLINIC | Age: 65
End: 2025-09-15
Payer: COMMERCIAL

## 2025-09-15 VITALS
WEIGHT: 293 LBS | BODY MASS INDEX: 44.41 KG/M2 | DIASTOLIC BLOOD PRESSURE: 71 MMHG | HEART RATE: 62 BPM | OXYGEN SATURATION: 98 % | RESPIRATION RATE: 16 BRPM | SYSTOLIC BLOOD PRESSURE: 108 MMHG | HEIGHT: 68 IN | TEMPERATURE: 94.9 F

## 2025-09-15 PROCEDURE — 99214 OFFICE O/P EST MOD 30 MIN: CPT

## 2025-09-16 ENCOUNTER — APPOINTMENT (OUTPATIENT)
Dept: CARDIOLOGY | Facility: CLINIC | Age: 65
End: 2025-09-16
Payer: COMMERCIAL

## 2025-09-16 ENCOUNTER — APPOINTMENT (OUTPATIENT)
Dept: PULMONOLOGY | Facility: CLINIC | Age: 65
End: 2025-09-16
Payer: COMMERCIAL

## 2025-09-16 VITALS
BODY MASS INDEX: 44.41 KG/M2 | SYSTOLIC BLOOD PRESSURE: 102 MMHG | DIASTOLIC BLOOD PRESSURE: 70 MMHG | WEIGHT: 293 LBS | HEIGHT: 68 IN | OXYGEN SATURATION: 98 % | HEART RATE: 57 BPM | TEMPERATURE: 98.2 F

## 2025-09-16 DIAGNOSIS — R00.2 PALPITATIONS: ICD-10-CM

## 2025-09-16 DIAGNOSIS — I83.899 VARICOSE VEINS OF UNSPECIFIED LOWER EXTREMITY WITH OTHER COMPLICATIONS: ICD-10-CM

## 2025-09-16 DIAGNOSIS — Z13.228 ENCOUNTER FOR SCREENING FOR OTHER METABOLIC DISORDERS: ICD-10-CM

## 2025-09-16 DIAGNOSIS — E78.5 HYPERLIPIDEMIA, UNSPECIFIED: ICD-10-CM

## 2025-09-16 DIAGNOSIS — Z01.818 ENCOUNTER FOR OTHER PREPROCEDURAL EXAMINATION: ICD-10-CM

## 2025-09-16 DIAGNOSIS — K21.9 GASTRO-ESOPHAGEAL REFLUX DISEASE W/OUT ESOPHAGITIS: ICD-10-CM

## 2025-09-16 DIAGNOSIS — R00.1 BRADYCARDIA, UNSPECIFIED: ICD-10-CM

## 2025-09-16 DIAGNOSIS — I48.0 PAROXYSMAL ATRIAL FIBRILLATION: ICD-10-CM

## 2025-09-16 DIAGNOSIS — E66.01 MORBID (SEVERE) OBESITY DUE TO EXCESS CALORIES: ICD-10-CM

## 2025-09-16 DIAGNOSIS — R06.02 SHORTNESS OF BREATH: ICD-10-CM

## 2025-09-16 PROCEDURE — 99214 OFFICE O/P EST MOD 30 MIN: CPT

## 2025-09-16 PROCEDURE — 93000 ELECTROCARDIOGRAM COMPLETE: CPT

## 2025-09-16 PROCEDURE — 71046 X-RAY EXAM CHEST 2 VIEWS: CPT

## 2025-09-16 PROCEDURE — G2211 COMPLEX E/M VISIT ADD ON: CPT

## 2025-09-18 ENCOUNTER — APPOINTMENT (OUTPATIENT)
Dept: ELECTROPHYSIOLOGY | Facility: CLINIC | Age: 65
End: 2025-09-18

## 2025-09-18 ENCOUNTER — APPOINTMENT (OUTPATIENT)
Dept: ENDOCRINOLOGY | Facility: CLINIC | Age: 65
End: 2025-09-18

## 2025-09-18 ENCOUNTER — APPOINTMENT (OUTPATIENT)
Dept: CARDIOLOGY | Facility: CLINIC | Age: 65
End: 2025-09-18
Payer: COMMERCIAL

## 2025-09-18 ENCOUNTER — NON-APPOINTMENT (OUTPATIENT)
Age: 65
End: 2025-09-18

## 2025-09-18 PROCEDURE — 93015 CV STRESS TEST SUPVJ I&R: CPT

## 2025-09-18 PROCEDURE — 78452 HT MUSCLE IMAGE SPECT MULT: CPT

## 2025-09-18 PROCEDURE — A9502: CPT

## 2025-09-19 ENCOUNTER — NON-APPOINTMENT (OUTPATIENT)
Age: 65
End: 2025-09-19

## 2025-09-20 LAB
25(OH)D3 SERPL-MCNC: 34.1 NG/ML
ALBUMIN SERPL ELPH-MCNC: 4.4 G/DL
ALBUMIN SERPL ELPH-MCNC: 4.4 G/DL
ALP BLD-CCNC: 101 U/L
ALP BLD-CCNC: 102 U/L
ALT SERPL-CCNC: 25 U/L
ALT SERPL-CCNC: 30 U/L
ANION GAP SERPL CALC-SCNC: 16 MMOL/L
APPEARANCE: CLEAR
AST SERPL-CCNC: 27 U/L
AST SERPL-CCNC: 29 U/L
BACTERIA: NEGATIVE /HPF
BASOPHILS # BLD AUTO: 0.02 K/UL
BASOPHILS NFR BLD AUTO: 0.3 %
BILIRUB DIRECT SERPL-MCNC: 0.14 MG/DL
BILIRUB INDIRECT SERPL-MCNC: 0.3 MG/DL
BILIRUB SERPL-MCNC: 0.4 MG/DL
BILIRUB SERPL-MCNC: 0.5 MG/DL
BILIRUBIN URINE: NEGATIVE
BLOOD URINE: NEGATIVE
BUN SERPL-MCNC: 12 MG/DL
CALCIUM SERPL-MCNC: 9.5 MG/DL
CAST: 0 /LPF
CHLORIDE SERPL-SCNC: 103 MMOL/L
CHOLEST SERPL-MCNC: 149 MG/DL
CK SERPL-CCNC: 65 U/L
CO2 SERPL-SCNC: 20 MMOL/L
COLOR: YELLOW
CREAT SERPL-MCNC: 0.65 MG/DL
EGFRCR SERPLBLD CKD-EPI 2021: 98 ML/MIN/1.73M2
EOSINOPHIL # BLD AUTO: 0.08 K/UL
EOSINOPHIL NFR BLD AUTO: 1.3 %
EPITHELIAL CELLS: 1 /HPF
ESTIMATED AVERAGE GLUCOSE: 105 MG/DL
GLUCOSE QUALITATIVE U: NEGATIVE MG/DL
GLUCOSE SERPL-MCNC: 87 MG/DL
HBA1C MFR BLD HPLC: 5.3 %
HCT VFR BLD CALC: 42.6 %
HDLC SERPL-MCNC: 58 MG/DL
HGB BLD-MCNC: 13.1 G/DL
IMM GRANULOCYTES NFR BLD AUTO: 0 %
KETONES URINE: NEGATIVE MG/DL
LDLC SERPL-MCNC: 75 MG/DL
LEUKOCYTE ESTERASE URINE: NEGATIVE
LYMPHOCYTES # BLD AUTO: 1.32 K/UL
LYMPHOCYTES NFR BLD AUTO: 21.7 %
MAN DIFF?: NORMAL
MCHC RBC-ENTMCNC: 28.1 PG
MCHC RBC-ENTMCNC: 30.8 G/DL
MCV RBC AUTO: 91.2 FL
MICROSCOPIC-UA: NORMAL
MONOCYTES # BLD AUTO: 0.52 K/UL
MONOCYTES NFR BLD AUTO: 8.6 %
NEUTROPHILS # BLD AUTO: 4.14 K/UL
NEUTROPHILS NFR BLD AUTO: 68.1 %
NITRITE URINE: NEGATIVE
NONHDLC SERPL-MCNC: 90 MG/DL
PH URINE: 6.5
PLATELET # BLD AUTO: 250 K/UL
POTASSIUM SERPL-SCNC: 4.1 MMOL/L
PROT SERPL-MCNC: 7.3 G/DL
PROT SERPL-MCNC: 7.3 G/DL
PROTEIN URINE: NEGATIVE MG/DL
RBC # BLD: 4.67 M/UL
RBC # FLD: 14.7 %
RED BLOOD CELLS URINE: 1 /HPF
SODIUM SERPL-SCNC: 139 MMOL/L
SPECIFIC GRAVITY URINE: 1.02
T4 FREE SERPL-MCNC: 1.1 NG/DL
TRIGL SERPL-MCNC: 79 MG/DL
TSH SERPL-ACNC: 0.76 UIU/ML
UROBILINOGEN URINE: 0.2 MG/DL
WBC # FLD AUTO: 6.08 K/UL
WHITE BLOOD CELLS URINE: 0 /HPF

## (undated) DEVICE — PACK IV START WITH CHG

## (undated) DEVICE — RADIAL JAW 4 LG CAPACITY WITH NDL

## (undated) DEVICE — Device

## (undated) DEVICE — TUBING ENDO EXT OLYMPUS 160 24HR USE

## (undated) DEVICE — BRUSH COLONOSCOPY CYTOLOGY

## (undated) DEVICE — CATH ELCTR GLIDE PRB 7FR

## (undated) DEVICE — TUBE O2 SUPL CRUSH RESIS CONN SOUTHSIDE ONLY

## (undated) DEVICE — SUT HEWSON RETRIEVER

## (undated) DEVICE — TUBING IV SET GRAVITY 3Y 100" MACRO

## (undated) DEVICE — FORCEP RADIAL JAW 4 W NDL 2.4MM 2.8MM 240CM ORANGE DISP

## (undated) DEVICE — SYR LUER SLIP TIP 30CC

## (undated) DEVICE — CATH IV SAFE BC 20G X 1.16" (PINK)

## (undated) DEVICE — TRAP QUICK CATCH  SINGL CHAMBER

## (undated) DEVICE — SOL IRR NS 0.9% 250ML

## (undated) DEVICE — SYR LUER SLIP TIP 50CC

## (undated) DEVICE — CLAMP BX HOT RAD JAW 3

## (undated) DEVICE — FORCEP RADIAL JAW 4 JUMBO 2.8MM 3.2MM 240CM ORANGE DISP

## (undated) DEVICE — SOL IRR POUR H2O 500ML

## (undated) DEVICE — MASK O2 NON REBREATH 3IN1 ADULT

## (undated) DEVICE — SYR IV POSIFLUSH NS 3ML 30/TY

## (undated) DEVICE — MARKER ENDO SPOT EX

## (undated) DEVICE — MASK OXYGEN PANORAMIC

## (undated) DEVICE — STERIS DEFENDO 3-PIECE KIT (AIR/WATER, SUCTION & BIOPSY VALVES)

## (undated) DEVICE — TUBING CANNULA SALTER LABS NASAL ADULT 7FT

## (undated) DEVICE — SET IV PUMP BLOOD 1VALVE 180FILTER NON-DEHP

## (undated) DEVICE — RETRIEVER ROTH NET PLATINUM-UNIVERSAL

## (undated) DEVICE — SENSOR O2 FINGER XL ADULT 24/BX 6BX/CA

## (undated) DEVICE — VALVE BIOPSY

## (undated) DEVICE — ENDOCUFF VISION SZ 3 SM PRPL

## (undated) DEVICE — NDL INJ SCLERO INTERJECT 23G

## (undated) DEVICE — CANISTER SUCTION 1200CC 10/SL

## (undated) DEVICE — SUCTION YANKAUER TAPERED BULBOUS NO VENT

## (undated) DEVICE — ELCTR GROUNDING PAD ADULT COVIDIEN

## (undated) DEVICE — CATH IV SAFE BC 22G X 1" (BLUE)

## (undated) DEVICE — FORMALIN CUPS 10% BUFFERED

## (undated) DEVICE — POLY TRAP ETRAP

## (undated) DEVICE — TUBE RECTAL 24FR

## (undated) DEVICE — ENDOCUFF VISION SZ 2 LG GRN

## (undated) DEVICE — SNARE POLYP SENS 27MM 240CM

## (undated) DEVICE — TUBING IV SET SECONDARY 34"

## (undated) DEVICE — TRAP SPECIMEN SPUTUM 40CC

## (undated) DEVICE — TUBING SUCTION CONN 6FT STERILE

## (undated) DEVICE — SNARE LRG

## (undated) DEVICE — CATH ELECHMSTAT  INJ 7FR 210CM